# Patient Record
Sex: FEMALE | Race: WHITE | NOT HISPANIC OR LATINO | Employment: FULL TIME | URBAN - METROPOLITAN AREA
[De-identification: names, ages, dates, MRNs, and addresses within clinical notes are randomized per-mention and may not be internally consistent; named-entity substitution may affect disease eponyms.]

---

## 2017-07-27 ENCOUNTER — ALLSCRIPTS OFFICE VISIT (OUTPATIENT)
Dept: OTHER | Facility: OTHER | Age: 37
End: 2017-07-27

## 2017-11-27 ENCOUNTER — ALLSCRIPTS OFFICE VISIT (OUTPATIENT)
Dept: PERINATAL CARE | Facility: CLINIC | Age: 37
End: 2017-11-27
Payer: COMMERCIAL

## 2017-11-27 PROCEDURE — 99203 OFFICE O/P NEW LOW 30 MIN: CPT | Performed by: GENETIC COUNSELOR, MS

## 2017-11-27 PROCEDURE — G0463 HOSPITAL OUTPT CLINIC VISIT: HCPCS | Performed by: GENETIC COUNSELOR, MS

## 2018-01-13 VITALS
HEIGHT: 67 IN | SYSTOLIC BLOOD PRESSURE: 106 MMHG | DIASTOLIC BLOOD PRESSURE: 52 MMHG | BODY MASS INDEX: 25.43 KG/M2 | WEIGHT: 162 LBS

## 2018-01-16 NOTE — PROGRESS NOTES
Chief Complaint  Patient and partner, Benson Caro, seen for genetic counseling to discuss maternal age related risks for aneuploidy and concerns related to a family history of cystic fibrosis  At the time of our genetic counseling session Genesis Skinner was not pregnant  At the age of 45 at delivery there is an estimated 1/104 risk for a fetal chromosome abnormality at term  Approximately half of these abnormalities are due to Down syndrome with the remainder due to other chromosome abnormalities  This couple was advised of the available prenatal diagnostic and screening procedures for consideration in a future pregnancy  I reviewed with the patient the options regarding prenatal diagnosis for chromosome abnormalities including CVS and amniocentesis  Chorionic villus sampling(CVS) is generally performed between 10 and 12 weeks of pregnancy and amniocentesis is generally performed at 16 weeks or later  Recent literature supports that CVS and amniocentesis both have an associated procedure related risk of miscarriage of less than 1/300  Chromosome results from CVS or amniocentesis are greater than 99 9% accurate  In addition microarray testing can detect submicroscopic deletions and duplications throughout the genome  Occasionally a repeat procedure may be necessary due to cell culture failure  Approximately 1% of the time, a mosaic chromosome result from CVS will necessitate a followup amniocentesis  Measurement of AFP from amniotic fluid is able to detect approximately 95% of open neural tube defects  Maternal serum AFP is recommended for patients who elect CVS     We also reviewed the availability and limitations of sequential screening, NIPS, and level II ultrasound evaluation   Sequential screening consisting of first trimester measurement of nuchal translucency combined with first trimester biochemical analysis as well as second trimester biochemical analysis is able to detect approximately 90% of pregnancies in which the fetus has Down syndrome, 90% of pregnancies in which the fetus has trisomy 25 and 80% of pregnancies which appears has an open neural tube defect  NIPS involves assessment of fragments of fetal DNA in maternal blood  This test has varying detection rates depending on the laboratory used though the quoted detection rate for Down syndrome is generally greater than 99% and detection rate for trisomy 18 is 98% or better  NIPS has a low false positive rate however consideration of prenatal diagnostic testing is always recommended following a positive NIPS  MSAFP is recommended for patients electing NIPS  Level II ultrasound evaluation is able to detect many major physical birth defects and variations in fetal development that may be associated with chromosome abnormalities  Level II ultrasound evaluation is not able to detect all birth defects or health problems  During our counseling session histories were taken on the patient's family and her 's family  Ajit Jung reports that his older sister tested positive as a carrier for cystic fibrosis at the time of routine prenatal screening  We discussed that he would have a 1/2 or 50 percent risk to also be a carrier for cystic fibrosis  We reviewed autosomal recessive inheritance in the general population carrier frequency for CF of approximately 1/25  The overall risk for them to have an affected child is 1/200  They were advised of the availability and limitations of carrier screening for CF  I encouraged Ajit Jung to obtain copies of his sisters test results so that we can ensure or testing on him includes the mutation she has  They were provided with CPT an ICD 10 codes for checking insurance coverage  We also discussed carrier screening for SMA and Fragile X and they were provided with the CPT codes for those tests as well   They were instructed to get back to me to facilitate testing once they have confirmed which tests they would like to shayy Lennon's history was also significant for him having a complex congenital heart defect requiring surgery at approximately 10months of age for tetralogy of Fallot and a 2nd surgery at approximately 1years of age for a VSD  He denies any other medical problems are known genetic diagnosis therefore we reviewed the multifactorial inheritance of apparently isolated congenital heart defects  They were advised of the recurrence risk of approximately 3-5 percent in a future pregnancy and the availability limitations of ultrasound evaluation including nuchal translucency, level 2 and fetal echocardiography for detecting congenital heart defects prenatally  Elenita Pavon reports having a paternal aunt diagnosed with breast cancer in her 35s  This aunt passed away from complications of this diagnosis at the age of 37  She expressed interest in considering a cancer risk evaluation and was provided with the contact information for the genetic counselor in the cancer center for further discussion and facilitation of testing  Patient reports being of [place], [place], [place] and native [place] descent while her  reports being of [place], [place], [place] and [facility] descent  They both deny having any known Jew ancestry  Hemoglobinopathy screening is also recommended if not previously performed  Lastly, we discussed the fact that it is important to keep in mind that everyone in the general population regardless of age, family history, or medical background has approximately a 3% risk of having a child with some type of her defect, genetic disease or intellectual disability  Currently there are no tests available to rule out all birth defects or health problems  Active Problems    1  Encounter for preconception consultation (V26 49) (Z31 69)   2  Genetic counseling and testing (V26 33)    Surgical History    1  History of Cervical Loop Electrosurgical Excision (LEEP)   2   History of Oral Surgery Tooth Extraction   3  History of Tonsillectomy    Family History    1  Family history of arthritis (V17 7) (Z82 61)   2  Family history of diabetes mellitus (V18 0) (Z83 3)   3  Family history of thyroid disease (V18 19) (Z83 49)   4  Family history of varicose veins (V17 49) (Z82 49)    5  Family history of Blood Transfusion (___ Ml)   6  Family history of hypertension (V17 49) (Z82 49)   7  Family history of skin cancer (V16 8) (Z80 8)    8  Family history of skin cancer (V16 8) (Z80 8)    9  Family history of osteoporosis (V17 81) (Z82 62)   10  Family history of skin cancer (V16 8) (Z80 8)    11  Family history of skin cancer (V16 8) (Z80 8)    12  Family history of skin cancer (V16 8) (Z80 8)    13  Family history of malignant neoplasm of breast (V16 3) (Z80 3)    14  Family history of hypertension (V17 49) (Z82 49)    Social History    ·    · Never a smoker   · Sexually active   · Social alcohol use (Z78 9)    Current Meds   1  NuvaRing RING; Therapy: (Recorded:27Nov2017) to Recorded    Allergies    1  No Known Drug Allergies    2  Animal dander - Cats   3  Dust   4  Mold   5  Other   6  Pollen   7   Seasonal    Vitals  Signs   Recorded: [id number] 20:15PA   Systolic: 269, RUE, Sitting  Diastolic: 70, RUE, Sitting  Height: 5 ft 6 5 in  Weight: 164 lb   BMI Calculated: 26 07  BSA Calculated: 1 85  Pain Scale: 0    Signatures   Electronically signed by : Kenia Oneil, ; Nov 27 2017 12:55PM EST                       (Author)

## 2018-01-22 VITALS
DIASTOLIC BLOOD PRESSURE: 70 MMHG | SYSTOLIC BLOOD PRESSURE: 104 MMHG | WEIGHT: 164 LBS | BODY MASS INDEX: 25.74 KG/M2 | HEIGHT: 67 IN

## 2018-11-13 ENCOUNTER — TELEPHONE (OUTPATIENT)
Dept: OBGYN CLINIC | Facility: CLINIC | Age: 38
End: 2018-11-13

## 2018-11-13 DIAGNOSIS — O20.9 VAGINAL BLEEDING IN PREGNANCY, FIRST TRIMESTER: Primary | ICD-10-CM

## 2018-11-13 NOTE — TELEPHONE ENCOUNTER
Dear Dr Linda Harris:    Pt called office today c/o vaginal "spotting" in early pregnancy  Pt's LMP is 18,  CHRISTOPHE is 19 based on LMP, GA 9 wks + 0 dys  Pt is scheduled for early US with DAYSI Aguilar on 18  Pt's blood type information is not available in Pt's EHR at this time  Pt states she does not know her blood type at this time  Pt denies miscarriage and voluntary  history  Pt states when wiping after voiding she notices blood on toilet tissue  Pt further states said blood varies from pink to brownish in color  Pt denies cramping or any other adverse symptoms at this time  Pt states she has  1 Para 0 status  Pt's name is Mer River (: [birth year])  Please advise  Thank you!     Joycelyn Watson MA

## 2018-11-13 NOTE — TELEPHONE ENCOUNTER
Spoke with patient  Us scheduled for tomorrow with Dr Dennis Montana in [facility] office at [phone]  Address provided  Order for T&S placed   bleeding worsens or pain develops go to E R

## 2018-11-13 NOTE — TELEPHONE ENCOUNTER
Pt called in regards to spotting  Pt is currently about 9wks pregnant lmp 9/11 has appt for early u/s on 11/29   Pt states that today she has some light pink spotting no cramps please advise

## 2018-11-14 ENCOUNTER — OFFICE VISIT (OUTPATIENT)
Dept: OBGYN CLINIC | Facility: MEDICAL CENTER | Age: 38
End: 2018-11-14
Payer: COMMERCIAL

## 2018-11-14 ENCOUNTER — APPOINTMENT (OUTPATIENT)
Dept: LAB | Facility: MEDICAL CENTER | Age: 38
End: 2018-11-14
Payer: COMMERCIAL

## 2018-11-14 VITALS
RESPIRATION RATE: 14 BRPM | HEIGHT: 66 IN | BODY MASS INDEX: 26.52 KG/M2 | WEIGHT: 165 LBS | SYSTOLIC BLOOD PRESSURE: 120 MMHG | DIASTOLIC BLOOD PRESSURE: 72 MMHG

## 2018-11-14 DIAGNOSIS — O26.851 SPOTTING AFFECTING PREGNANCY IN FIRST TRIMESTER: Primary | ICD-10-CM

## 2018-11-14 DIAGNOSIS — O20.9 VAGINAL BLEEDING IN PREGNANCY, FIRST TRIMESTER: ICD-10-CM

## 2018-11-14 LAB
ABO GROUP BLD: NORMAL
BLD GP AB SCN SERPL QL: NEGATIVE
RH BLD: POSITIVE
SPECIMEN EXPIRATION DATE: NORMAL

## 2018-11-14 PROCEDURE — 86901 BLOOD TYPING SEROLOGIC RH(D): CPT

## 2018-11-14 PROCEDURE — 86900 BLOOD TYPING SEROLOGIC ABO: CPT

## 2018-11-14 PROCEDURE — 36415 COLL VENOUS BLD VENIPUNCTURE: CPT

## 2018-11-14 PROCEDURE — 76817 TRANSVAGINAL US OBSTETRIC: CPT | Performed by: OBSTETRICS & GYNECOLOGY

## 2018-11-14 PROCEDURE — 86850 RBC ANTIBODY SCREEN: CPT

## 2018-11-14 PROCEDURE — 99213 OFFICE O/P EST LOW 20 MIN: CPT | Performed by: OBSTETRICS & GYNECOLOGY

## 2018-11-14 NOTE — PROGRESS NOTES
Assessment/Plan:      Diagnoses and all orders for this visit:    Dionicio Person affecting pregnancy in first trimester        Suspected missed AB at 6 weeks  Return in 1 week for ultrasound  Subjective:     Patient ID: Peyton Kenyon is a 45 y o  female  Patient is a 27-year-old female para 0 at 9 weeks and 1 day by LMP (normal) here with a complaint of vaginal spotting and mild pelvic cramps  Patient is unaware for Rh status  Patient denies a history of ectopic pregnancy  This is a wanted pregnancy  Past medical history is negative  Past surgical history significant for VAISHALI JULIANA  Current medications none  No known drug allergies  Father of the baby was born with a cardiac defect,  tetralogy of Fallot  Patient denies a history of genital herpes  Patient having reports chickenpox as a child  Review of Systems   Gastrointestinal: Negative for abdominal pain  Genitourinary: Positive for vaginal bleeding  Negative for pelvic pain  Objective:     Physical Exam   Constitutional: She is oriented to person, place, and time  She appears well-developed  Pulmonary/Chest: Effort normal    Neurological: She is oriented to person, place, and time  Psychiatric: She has a normal mood and affect   Her behavior is normal

## 2018-11-14 NOTE — PROGRESS NOTES
Procedures  Early OB Ultrasound Procedure Note    Referring Physician: No ref  provider found  Technician: Study performed by the interpreting physician    Indications:  Early gestation, spotting    Procedure Details   The entire study was done at a setting of 6 5 MHz  The cul-de-sac is free and contains no fluid  Measurements accompanying this report are noted  A gestational sac is seen containing a yolk sac and a ramires embryo  No subchorionic lucency is noted  The embryonic crown-rump length calculates to an estimated gestational age of 7 weeks, 1 days  Embryonic cardiac activity is not seen  Placental location:  Description of fetal anatomy:  Description of ovaries:Normal  Description of uterus:retroverted    Findings: ramires intrauterine pregnancy at 6 weeks, 1 days, with no heart beat  Repeat sonogram in 1 week

## 2018-11-27 ENCOUNTER — OFFICE VISIT (OUTPATIENT)
Dept: OBGYN CLINIC | Facility: MEDICAL CENTER | Age: 38
End: 2018-11-27
Payer: COMMERCIAL

## 2018-11-27 VITALS
WEIGHT: 165.4 LBS | DIASTOLIC BLOOD PRESSURE: 60 MMHG | SYSTOLIC BLOOD PRESSURE: 114 MMHG | BODY MASS INDEX: 26.58 KG/M2 | HEIGHT: 66 IN | RESPIRATION RATE: 14 BRPM

## 2018-11-27 DIAGNOSIS — O03.9 SPONTANEOUS ABORTION: Primary | ICD-10-CM

## 2018-11-27 PROCEDURE — 76817 TRANSVAGINAL US OBSTETRIC: CPT | Performed by: PHYSICIAN ASSISTANT

## 2018-11-27 NOTE — PROGRESS NOTES
Assessment/Plan:    Spontaneous   US findings c/w complete SAb  No evidence of gestational sac or POC  Will check beta HCG, trend down if necessary  Rh pos  Pt appropriately upset, all questions answered    rev'd w/ pt that ~20% of all pregnancies end in miscarriage and miscarriage may occur for many reasons, not all of which can be identified  Advised to wait 2-3 cycles before TTC as she and partner hope to start TTC in near future         Diagnoses and all orders for this visit:    Spontaneous   -     hCG, quantitative; Future        Subjective:      Patient ID: Angela Sesay is a 45 y o  female  Pt presents for f/u dating [address] [place] : The embryonic crown-rump length calculates to an estimated gestational age of 7 weeks, 1 days  Embryonic cardiac activity is not seen  Spotting noted prior to last US  Rh pos    Pt states she experienced heavy bleeding after last US, noted clotting/tissue w/ bleeding   Lasted approx 1 wk, has since stopped  No pelvic pain, cramping, persistent spotting or bleeding since that time          The following portions of the patient's history were reviewed and updated as appropriate: allergies, current medications, past family history, past medical history, past social history, past surgical history and problem list     Review of Systems   Constitutional: Negative for appetite change, fatigue and unexpected weight change  Respiratory: Negative for chest tightness and shortness of breath  Cardiovascular: Negative for chest pain, palpitations and leg swelling  Gastrointestinal: Negative for abdominal pain, constipation, diarrhea, nausea and vomiting  Genitourinary: Positive for vaginal bleeding  Negative for difficulty urinating, dyspareunia, menstrual problem, pelvic pain and vaginal discharge  Musculoskeletal: Negative for arthralgias and myalgias  Neurological: Negative for dizziness, light-headedness and headaches     Psychiatric/Behavioral: Negative for dysphoric mood  The patient is not nervous/anxious  All other systems reviewed and are negative  Objective:      /60 (BP Location: Left arm, Patient Position: Sitting, Cuff Size: Standard)   Resp 14   Ht 5' 6" (1 676 m)   Wt 75 kg (165 lb 6 4 oz)   LMP 09/11/2018 (Exact Date)   BMI 26 70 kg/m²          Physical Exam   Constitutional: She is oriented to person, place, and time  She appears well-developed and well-nourished  HENT:   Head: Normocephalic and atraumatic  Neurological: She is alert and oriented to person, place, and time  Skin: Skin is warm and dry  Psychiatric: She has a normal mood and affect  Nursing note and vitals reviewed

## 2018-11-27 NOTE — ASSESSMENT & PLAN NOTE
US findings c/w complete SAb  No evidence of gestational sac or POC  Will check beta HCG, trend down if necessary  Rh pos  Pt appropriately upset, all questions answered    rev'd w/ pt that ~20% of all pregnancies end in miscarriage and miscarriage may occur for many reasons, not all of which can be identified     Advised to wait 2-3 cycles before TTC as she and partner hope to start TTC in near future

## 2019-03-20 ENCOUNTER — OFFICE VISIT (OUTPATIENT)
Dept: URGENT CARE | Facility: MEDICAL CENTER | Age: 39
End: 2019-03-20
Payer: COMMERCIAL

## 2019-03-20 VITALS
WEIGHT: 160 LBS | SYSTOLIC BLOOD PRESSURE: 120 MMHG | OXYGEN SATURATION: 100 % | DIASTOLIC BLOOD PRESSURE: 76 MMHG | RESPIRATION RATE: 20 BRPM | BODY MASS INDEX: 25.71 KG/M2 | HEART RATE: 74 BPM | HEIGHT: 66 IN | TEMPERATURE: 98 F

## 2019-03-20 DIAGNOSIS — R30.0 DYSURIA: Primary | ICD-10-CM

## 2019-03-20 LAB
SL AMB  POCT GLUCOSE, UA: ABNORMAL
SL AMB LEUKOCYTE ESTERASE,UA: ABNORMAL
SL AMB POCT BILIRUBIN,UA: ABNORMAL
SL AMB POCT BLOOD,UA: ABNORMAL
SL AMB POCT CLARITY,UA: ABNORMAL
SL AMB POCT COLOR,UA: ABNORMAL
SL AMB POCT KETONES,UA: ABNORMAL
SL AMB POCT NITRITE,UA: ABNORMAL
SL AMB POCT PH,UA: 6
SL AMB POCT SPECIFIC GRAVITY,UA: 1
SL AMB POCT URINE HCG: NEGATIVE
SL AMB POCT URINE PROTEIN: ABNORMAL
SL AMB POCT UROBILINOGEN: 0.2

## 2019-03-20 PROCEDURE — 99213 OFFICE O/P EST LOW 20 MIN: CPT | Performed by: PHYSICIAN ASSISTANT

## 2019-03-20 PROCEDURE — 81002 URINALYSIS NONAUTO W/O SCOPE: CPT | Performed by: PHYSICIAN ASSISTANT

## 2019-03-20 RX ORDER — NITROFURANTOIN 25; 75 MG/1; MG/1
100 CAPSULE ORAL 2 TIMES DAILY
Qty: 10 CAPSULE | Refills: 0 | Status: SHIPPED | OUTPATIENT
Start: 2019-03-20 | End: 2019-03-25

## 2019-03-20 NOTE — PROGRESS NOTES
[facility]      NAME: Claudean Knack is a 45 y o  female  : 1980    MRN: [id number]  DATE: 2019  TIME: 7:10 PM    Assessment and Plan   Dysuria [R30 0]  1  Dysuria  POCT urine HCG    POCT urine dip    nitrofurantoin (MACROBID) 100 mg capsule    Urine culture       Patient Instructions     Urine dip preg negative    Patient's symptoms are likely consistent with UTI    - An antibiotic have been prescribed, please take as directed  - Plenty of fluids advised  - Recommend daily probiotic or eating yogurt with live cultures while on antibiotic   - Urine culture will be sent for definitive evaluation  Results take approx 72 hours to return  - May try over-the-counter Pyridium for next 24-48 hours for any excessive burning, pain, or pressure in the bladder area  Be advised, it may turn urine an orange color   - Watch for any worsening of the symptoms or any fever or chills, flank pain, nausea or worsening abdominal pain and if any, advised to seek immediate medical attention    - If symptoms persist, follow-up with PCP for re-evaluation in 5-7 days      Chief Complaint     Chief Complaint   Patient presents with    Possible UTI     Blood in urine, urinating frequently         History of Present Illness   Claudean Knack presents to the clinic c/o      80-year-old female, presents for evaluation of burning with urination, increased urinary frequency /urgency as well as visible blood in the urine, that started today  Patient denies any history of kidney stone, new onset low back pain  She denies any abdominal pain, diaphoretic episodes, nausea vomiting diarrhea  Review of Systems   Review of Systems   Constitutional: Negative for fever  Gastrointestinal: Negative for abdominal pain and nausea  Genitourinary: Positive for dysuria, frequency, hematuria and urgency  Current Medications     No long-term medications on file         Current Allergies     Allergies as of 2019 - Reviewed 03/20/2019   Allergen Reaction Noted    Mold extract  [trichophyton]  11/27/2017    Other  11/27/2017    Pollen extract  11/27/2017            The following portions of the patient's history were reviewed and updated as appropriate: allergies, current medications, past family history, past medical history, past social history, past surgical history and problem list     HISTORICAL INFO:  History reviewed  No pertinent past medical history  Past Surgical History:   Procedure Laterality Date    WISDOM TOOTH EXTRACTION         Objective   /76   Pulse 74   Temp 98 °F (36 7 °C) (Temporal)   Resp 20   Ht 5' 6" (1 676 m)   Wt 72 6 kg (160 lb)   LMP 09/11/2018 (Exact Date)   SpO2 100%   BMI 25 82 kg/m²        Physical Exam     Physical Exam   Constitutional: She appears well-developed and well-nourished  No distress  HENT:   Head: Normocephalic and atraumatic  Cardiovascular: Normal rate, regular rhythm and normal heart sounds  Pulmonary/Chest: Effort normal and breath sounds normal  No respiratory distress  She has no wheezes  She has no rales  Abdominal: Normal appearance  There is no tenderness  There is no rigidity, no rebound, no guarding, no CVA tenderness and negative Tafoya's sign  Skin: Skin is warm and dry  She is not diaphoretic  Nursing note and vitals reviewed  M*Modal software was used to dictate this note  It may contain errors with dictating incorrect words/spelling  Please contact provider directly for any questions       Consuelo Bailey PA-C

## 2019-03-29 ENCOUNTER — OFFICE VISIT (OUTPATIENT)
Dept: URGENT CARE | Facility: MEDICAL CENTER | Age: 39
End: 2019-03-29
Payer: COMMERCIAL

## 2019-03-29 VITALS
HEART RATE: 75 BPM | HEIGHT: 66 IN | RESPIRATION RATE: 20 BRPM | DIASTOLIC BLOOD PRESSURE: 70 MMHG | OXYGEN SATURATION: 100 % | WEIGHT: 162.6 LBS | BODY MASS INDEX: 26.13 KG/M2 | TEMPERATURE: 99.7 F | SYSTOLIC BLOOD PRESSURE: 96 MMHG

## 2019-03-29 DIAGNOSIS — R30.0 DYSURIA: Primary | ICD-10-CM

## 2019-03-29 LAB
SL AMB  POCT GLUCOSE, UA: ABNORMAL
SL AMB LEUKOCYTE ESTERASE,UA: ABNORMAL
SL AMB POCT BILIRUBIN,UA: ABNORMAL
SL AMB POCT BLOOD,UA: ABNORMAL
SL AMB POCT CLARITY,UA: ABNORMAL
SL AMB POCT COLOR,UA: YELLOW
SL AMB POCT KETONES,UA: ABNORMAL
SL AMB POCT NITRITE,UA: ABNORMAL
SL AMB POCT PH,UA: 6
SL AMB POCT SPECIFIC GRAVITY,UA: 1.01
SL AMB POCT URINE PROTEIN: ABNORMAL
SL AMB POCT UROBILINOGEN: 0.2

## 2019-03-29 PROCEDURE — 87077 CULTURE AEROBIC IDENTIFY: CPT

## 2019-03-29 PROCEDURE — 87086 URINE CULTURE/COLONY COUNT: CPT

## 2019-03-29 PROCEDURE — 81002 URINALYSIS NONAUTO W/O SCOPE: CPT

## 2019-03-29 PROCEDURE — 99213 OFFICE O/P EST LOW 20 MIN: CPT

## 2019-03-29 PROCEDURE — 87186 SC STD MICRODIL/AGAR DIL: CPT

## 2019-03-29 RX ORDER — CIPROFLOXACIN 500 MG/1
500 TABLET, FILM COATED ORAL EVERY 12 HOURS SCHEDULED
Qty: 14 TABLET | Refills: 0 | Status: SHIPPED | OUTPATIENT
Start: 2019-03-29 | End: 2019-04-05

## 2019-03-31 LAB — BACTERIA UR CULT: ABNORMAL

## 2019-05-23 ENCOUNTER — INITIAL PRENATAL (OUTPATIENT)
Dept: OBGYN CLINIC | Facility: CLINIC | Age: 39
End: 2019-05-23

## 2019-05-23 VITALS
SYSTOLIC BLOOD PRESSURE: 116 MMHG | RESPIRATION RATE: 16 BRPM | DIASTOLIC BLOOD PRESSURE: 68 MMHG | HEART RATE: 88 BPM | BODY MASS INDEX: 24.23 KG/M2 | WEIGHT: 150.8 LBS | HEIGHT: 66 IN

## 2019-05-23 DIAGNOSIS — Z98.890 HISTORY OF LOOP ELECTRICAL EXCISION PROCEDURE (LEEP): ICD-10-CM

## 2019-05-23 DIAGNOSIS — Z3A.11 11 WEEKS GESTATION OF PREGNANCY: ICD-10-CM

## 2019-05-23 DIAGNOSIS — O09.511 AMA (ADVANCED MATERNAL AGE) PRIMIGRAVIDA 35+, FIRST TRIMESTER: ICD-10-CM

## 2019-05-23 DIAGNOSIS — Z34.01 ENCOUNTER FOR SUPERVISION OF NORMAL FIRST PREGNANCY IN FIRST TRIMESTER: Primary | ICD-10-CM

## 2019-05-23 PROCEDURE — OBC: Performed by: OBSTETRICS & GYNECOLOGY

## 2019-05-24 LAB
ABO GROUP BLD: ABNORMAL
BASOPHILS # BLD AUTO: 0 X10E3/UL (ref 0–0.2)
BASOPHILS NFR BLD AUTO: 0 %
BLD GP AB SCN SERPL QL: NEGATIVE
EOSINOPHIL # BLD AUTO: 0.1 X10E3/UL (ref 0–0.4)
EOSINOPHIL NFR BLD AUTO: 1 %
ERYTHROCYTE [DISTWIDTH] IN BLOOD BY AUTOMATED COUNT: 13.5 % (ref 12.3–15.4)
HBV SURFACE AG SERPL QL IA: NEGATIVE
HCT VFR BLD AUTO: 36.1 % (ref 34–46.6)
HGB BLD-MCNC: 12.2 G/DL (ref 11.1–15.9)
HIV 1+2 AB+HIV1 P24 AG SERPL QL IA: NON REACTIVE
IMM GRANULOCYTES # BLD: 0 X10E3/UL (ref 0–0.1)
IMM GRANULOCYTES NFR BLD: 0 %
LYMPHOCYTES # BLD AUTO: 2 X10E3/UL (ref 0.7–3.1)
LYMPHOCYTES NFR BLD AUTO: 17 %
MCH RBC QN AUTO: 28.8 PG (ref 26.6–33)
MCHC RBC AUTO-ENTMCNC: 33.8 G/DL (ref 31.5–35.7)
MCV RBC AUTO: 85 FL (ref 79–97)
MONOCYTES # BLD AUTO: 0.5 X10E3/UL (ref 0.1–0.9)
MONOCYTES NFR BLD AUTO: 4 %
NEUTROPHILS # BLD AUTO: 9.4 X10E3/UL (ref 1.4–7)
NEUTROPHILS NFR BLD AUTO: 78 %
PLATELET # BLD AUTO: 330 X10E3/UL (ref 150–450)
RBC # BLD AUTO: 4.23 X10E6/UL (ref 3.77–5.28)
RH BLD: POSITIVE
RPR SER QL: NON REACTIVE
RUBV IGG SERPL IA-ACNC: 1.56 INDEX
WBC # BLD AUTO: 12.1 X10E3/UL (ref 3.4–10.8)

## 2019-06-01 ENCOUNTER — ROUTINE PRENATAL (OUTPATIENT)
Dept: PERINATAL CARE | Facility: CLINIC | Age: 39
End: 2019-06-01
Payer: COMMERCIAL

## 2019-06-01 VITALS
HEART RATE: 83 BPM | HEIGHT: 66 IN | BODY MASS INDEX: 24.49 KG/M2 | WEIGHT: 152.4 LBS | DIASTOLIC BLOOD PRESSURE: 68 MMHG | SYSTOLIC BLOOD PRESSURE: 106 MMHG

## 2019-06-01 DIAGNOSIS — Z3A.12 12 WEEKS GESTATION OF PREGNANCY: ICD-10-CM

## 2019-06-01 DIAGNOSIS — O35.2XX0 HEREDITARY DISEASE IN FAMILY POSSIBLY AFFECTING FETUS, AFFECTING MANAGEMENT OF MOTHER IN PREGNANCY, SINGLE OR UNSPECIFIED FETUS: ICD-10-CM

## 2019-06-01 DIAGNOSIS — Z98.890 HISTORY OF LOOP ELECTRICAL EXCISION PROCEDURE (LEEP): ICD-10-CM

## 2019-06-01 DIAGNOSIS — O09.521 ELDERLY MULTIGRAVIDA, FIRST TRIMESTER: Primary | ICD-10-CM

## 2019-06-01 DIAGNOSIS — Z34.01 ENCOUNTER FOR SUPERVISION OF NORMAL FIRST PREGNANCY IN FIRST TRIMESTER: ICD-10-CM

## 2019-06-01 PROCEDURE — 76801 OB US < 14 WKS SINGLE FETUS: CPT | Performed by: OBSTETRICS & GYNECOLOGY

## 2019-06-01 PROCEDURE — 76813 OB US NUCHAL MEAS 1 GEST: CPT | Performed by: OBSTETRICS & GYNECOLOGY

## 2019-06-01 PROCEDURE — 99241 PR OFFICE CONSULTATION NEW/ESTAB PATIENT 15 MIN: CPT | Performed by: OBSTETRICS & GYNECOLOGY

## 2019-06-03 ENCOUNTER — OFFICE VISIT (OUTPATIENT)
Dept: PERINATAL CARE | Facility: CLINIC | Age: 39
End: 2019-06-03

## 2019-06-03 VITALS
BODY MASS INDEX: 24.43 KG/M2 | WEIGHT: 152 LBS | HEART RATE: 82 BPM | DIASTOLIC BLOOD PRESSURE: 66 MMHG | SYSTOLIC BLOOD PRESSURE: 101 MMHG | HEIGHT: 66 IN

## 2019-06-03 DIAGNOSIS — Z3A.13 13 WEEKS GESTATION OF PREGNANCY: ICD-10-CM

## 2019-06-03 DIAGNOSIS — O09.521 ELDERLY MULTIGRAVIDA, FIRST TRIMESTER: Primary | ICD-10-CM

## 2019-06-04 ENCOUNTER — OFFICE VISIT (OUTPATIENT)
Dept: URGENT CARE | Facility: MEDICAL CENTER | Age: 39
End: 2019-06-04
Payer: COMMERCIAL

## 2019-06-04 VITALS
RESPIRATION RATE: 18 BRPM | TEMPERATURE: 98.1 F | HEIGHT: 66 IN | HEART RATE: 76 BPM | SYSTOLIC BLOOD PRESSURE: 96 MMHG | DIASTOLIC BLOOD PRESSURE: 62 MMHG | OXYGEN SATURATION: 100 % | WEIGHT: 155.4 LBS | BODY MASS INDEX: 24.98 KG/M2

## 2019-06-04 DIAGNOSIS — R10.9 FLANK PAIN: Primary | ICD-10-CM

## 2019-06-04 PROCEDURE — 99213 OFFICE O/P EST LOW 20 MIN: CPT | Performed by: PHYSICIAN ASSISTANT

## 2019-06-04 PROCEDURE — 81002 URINALYSIS NONAUTO W/O SCOPE: CPT | Performed by: PHYSICIAN ASSISTANT

## 2019-06-06 ENCOUNTER — ROUTINE PRENATAL (OUTPATIENT)
Dept: OBGYN CLINIC | Facility: CLINIC | Age: 39
End: 2019-06-06

## 2019-06-06 VITALS — WEIGHT: 150 LBS | SYSTOLIC BLOOD PRESSURE: 108 MMHG | DIASTOLIC BLOOD PRESSURE: 64 MMHG | BODY MASS INDEX: 24.21 KG/M2

## 2019-06-06 DIAGNOSIS — O09.521 ELDERLY MULTIGRAVIDA, FIRST TRIMESTER: ICD-10-CM

## 2019-06-06 DIAGNOSIS — Z3A.13 13 WEEKS GESTATION OF PREGNANCY: ICD-10-CM

## 2019-06-06 DIAGNOSIS — Z34.81 ENCOUNTER FOR SUPERVISION OF OTHER NORMAL PREGNANCY IN FIRST TRIMESTER: Primary | ICD-10-CM

## 2019-06-06 DIAGNOSIS — Z12.4 SCREENING FOR MALIGNANT NEOPLASM OF THE CERVIX: ICD-10-CM

## 2019-06-06 DIAGNOSIS — Z11.3 SCREENING FOR STD (SEXUALLY TRANSMITTED DISEASE): ICD-10-CM

## 2019-06-06 DIAGNOSIS — Z98.890 HISTORY OF LOOP ELECTRICAL EXCISION PROCEDURE (LEEP): ICD-10-CM

## 2019-06-06 PROCEDURE — NOBC: Performed by: OBSTETRICS & GYNECOLOGY

## 2019-06-07 LAB
BACTERIA UR CULT: NORMAL
Lab: NO GROWTH
SL AMB  POCT GLUCOSE, UA: ABNORMAL
SL AMB LEUKOCYTE ESTERASE,UA: ABNORMAL
SL AMB POCT BILIRUBIN,UA: ABNORMAL
SL AMB POCT BLOOD,UA: ABNORMAL
SL AMB POCT CLARITY,UA: CLEAR
SL AMB POCT COLOR,UA: ABNORMAL
SL AMB POCT KETONES,UA: ABNORMAL
SL AMB POCT NITRITE,UA: ABNORMAL
SL AMB POCT PH,UA: 7
SL AMB POCT SPECIFIC GRAVITY,UA: 1.02
SL AMB POCT URINE PROTEIN: ABNORMAL
SL AMB POCT UROBILINOGEN: 0.2

## 2019-06-10 LAB
C TRACH RRNA CVX QL NAA+PROBE: NEGATIVE
CYTOLOGIST CVX/VAG CYTO: NORMAL
DX ICD CODE: NORMAL
HPV I/H RISK 1 DNA CVX QL PROBE+SIG AMP: NEGATIVE
N GONORRHOEA RRNA CVX QL NAA+PROBE: NEGATIVE
OTHER STN SPEC: NORMAL
PATH REPORT.FINAL DX SPEC: NORMAL
SL AMB NOTE:: NORMAL
SL AMB SPECIMEN ADEQUACY: NORMAL
SL AMB TEST METHODOLOGY: NORMAL

## 2019-06-13 ENCOUNTER — TELEPHONE (OUTPATIENT)
Dept: PERINATAL CARE | Facility: CLINIC | Age: 39
End: 2019-06-13

## 2019-06-26 ENCOUNTER — TRANSCRIBE ORDERS (OUTPATIENT)
Dept: LAB | Facility: CLINIC | Age: 39
End: 2019-06-26

## 2019-06-26 ENCOUNTER — LAB (OUTPATIENT)
Dept: LAB | Facility: CLINIC | Age: 39
End: 2019-06-26
Payer: COMMERCIAL

## 2019-06-26 DIAGNOSIS — Z36.9 UNSPECIFIED ANTENATAL SCREENING: ICD-10-CM

## 2019-06-26 DIAGNOSIS — Z33.1 PREGNANT STATE, INCIDENTAL: Primary | ICD-10-CM

## 2019-06-26 DIAGNOSIS — Z33.1 PREGNANT STATE, INCIDENTAL: ICD-10-CM

## 2019-06-26 PROCEDURE — 36415 COLL VENOUS BLD VENIPUNCTURE: CPT

## 2019-06-26 PROCEDURE — 82105 ALPHA-FETOPROTEIN SERUM: CPT

## 2019-07-01 ENCOUNTER — ROUTINE PRENATAL (OUTPATIENT)
Dept: OBGYN CLINIC | Facility: CLINIC | Age: 39
End: 2019-07-01

## 2019-07-01 VITALS — WEIGHT: 153 LBS | SYSTOLIC BLOOD PRESSURE: 102 MMHG | BODY MASS INDEX: 24.69 KG/M2 | DIASTOLIC BLOOD PRESSURE: 60 MMHG

## 2019-07-01 DIAGNOSIS — Z3A.17 17 WEEKS GESTATION OF PREGNANCY: ICD-10-CM

## 2019-07-01 DIAGNOSIS — O09.522 MULTIGRAVIDA OF ADVANCED MATERNAL AGE IN SECOND TRIMESTER: ICD-10-CM

## 2019-07-01 DIAGNOSIS — Z34.82 PRENATAL CARE, SUBSEQUENT PREGNANCY IN SECOND TRIMESTER: Primary | ICD-10-CM

## 2019-07-01 PROCEDURE — PNV: Performed by: OBSTETRICS & GYNECOLOGY

## 2019-07-01 NOTE — PROGRESS NOTES
45 y o    female at 17w1d EGA for PNV  BP : 102/60  TWG: 3llbs  Discussed constipation - prune juice/apple juice, colace, hydration  No contractions or bleeding  Good FM  Not finding out sex of baby, has level II scheduled  Traveling to [facility] soon  Discussed walking during flight, staying hydrated  Plans on breast feeding, discussed baby&me center, breastfeeding class

## 2019-07-02 ENCOUNTER — TELEPHONE (OUTPATIENT)
Dept: OBGYN CLINIC | Facility: CLINIC | Age: 39
End: 2019-07-02

## 2019-07-02 NOTE — TELEPHONE ENCOUNTER
Pt Called, is going on cruise to [place] 7/20-7/27  Needs a note with due date and approval to go   She is 17 weeks

## 2019-07-03 ENCOUNTER — TELEPHONE (OUTPATIENT)
Dept: PERINATAL CARE | Facility: CLINIC | Age: 39
End: 2019-07-03

## 2019-07-03 LAB
2ND TRIMESTER 4 SCREEN SERPL-IMP: NORMAL
AFP ADJ MOM SERPL: 1.24
AFP INTERP AMN-IMP: NORMAL
AFP INTERP SERPL-IMP: NORMAL
AFP INTERP SERPL-IMP: NORMAL
AFP SERPL-MCNC: 41.8 NG/ML
AGE AT DELIVERY: 39.2 YR
GA METHOD: NORMAL
GA: 16.4 WEEKS
IDDM PATIENT QL: NO
MULTIPLE PREGNANCY: NO
NEURAL TUBE DEFECT RISK FETUS: 5748 %

## 2019-07-03 NOTE — TELEPHONE ENCOUNTER
----- Message from Yamileth Garces MD sent at 7/3/2019  9:32 AM EDT -----  I reviewed the lab study today and the results are normal

## 2019-07-03 NOTE — TELEPHONE ENCOUNTER
Spoke to pt and provided her with the results of the MSAFP  Pt receptive and declines questions at this time

## 2019-07-29 ENCOUNTER — ROUTINE PRENATAL (OUTPATIENT)
Dept: OBGYN CLINIC | Facility: CLINIC | Age: 39
End: 2019-07-29

## 2019-07-29 ENCOUNTER — ROUTINE PRENATAL (OUTPATIENT)
Dept: PERINATAL CARE | Facility: CLINIC | Age: 39
End: 2019-07-29
Payer: COMMERCIAL

## 2019-07-29 VITALS
BODY MASS INDEX: 25.91 KG/M2 | WEIGHT: 161.2 LBS | SYSTOLIC BLOOD PRESSURE: 101 MMHG | HEIGHT: 66 IN | HEART RATE: 73 BPM | DIASTOLIC BLOOD PRESSURE: 68 MMHG

## 2019-07-29 VITALS — DIASTOLIC BLOOD PRESSURE: 66 MMHG | SYSTOLIC BLOOD PRESSURE: 104 MMHG | WEIGHT: 163 LBS | BODY MASS INDEX: 26.31 KG/M2

## 2019-07-29 DIAGNOSIS — Z36.86 ENCOUNTER FOR ANTENATAL SCREENING FOR CERVICAL LENGTH: ICD-10-CM

## 2019-07-29 DIAGNOSIS — O09.522 ELDERLY MULTIGRAVIDA, SECOND TRIMESTER: Primary | ICD-10-CM

## 2019-07-29 DIAGNOSIS — Z3A.21 21 WEEKS GESTATION OF PREGNANCY: ICD-10-CM

## 2019-07-29 PROBLEM — O03.9 SPONTANEOUS ABORTION: Status: RESOLVED | Noted: 2018-11-27 | Resolved: 2019-07-29

## 2019-07-29 PROCEDURE — PNV: Performed by: OBSTETRICS & GYNECOLOGY

## 2019-07-29 PROCEDURE — 99212 OFFICE O/P EST SF 10 MIN: CPT | Performed by: OBSTETRICS & GYNECOLOGY

## 2019-07-29 PROCEDURE — 76817 TRANSVAGINAL US OBSTETRIC: CPT | Performed by: OBSTETRICS & GYNECOLOGY

## 2019-07-29 PROCEDURE — 76811 OB US DETAILED SNGL FETUS: CPT | Performed by: OBSTETRICS & GYNECOLOGY

## 2019-07-29 NOTE — PROGRESS NOTES
45 y o    female at 21 1 wga EGA for PNV  BP : 104/66  TW  Feeling well  No complaints  Level 2 went well today    Echo at 24-20 - father had terology  Reviewed M21 and AFP results  Discussed peds in the area  Reviewed PTL precautions   F/u 4 weeks

## 2019-07-29 NOTE — PROGRESS NOTES
The patient was seen today for an ultrasound  Please see ultrasound report (located under Ob Procedures) for additional details  Thank you very much for allowing us to participate in the care of this very nice patient  Should you have any questions, please do not hesitate to contact me  Johan Villatoro MD [id number]  Attending Physician, [place]

## 2019-07-29 NOTE — PROGRESS NOTES
Wants to discuss pediatricians   Wants to review genetic testing (DOES NOT WANT TO KNOW SEX OF BABY)

## 2019-08-22 ENCOUNTER — ROUTINE PRENATAL (OUTPATIENT)
Dept: PERINATAL CARE | Facility: CLINIC | Age: 39
End: 2019-08-22
Payer: COMMERCIAL

## 2019-08-22 VITALS
WEIGHT: 168.6 LBS | DIASTOLIC BLOOD PRESSURE: 71 MMHG | SYSTOLIC BLOOD PRESSURE: 109 MMHG | HEIGHT: 66 IN | BODY MASS INDEX: 27.1 KG/M2 | HEART RATE: 80 BPM

## 2019-08-22 DIAGNOSIS — O09.522 ELDERLY MULTIGRAVIDA, SECOND TRIMESTER: ICD-10-CM

## 2019-08-22 DIAGNOSIS — Z3A.24 24 WEEKS GESTATION OF PREGNANCY: ICD-10-CM

## 2019-08-22 DIAGNOSIS — O35.2XX0 HEREDITARY DISEASE IN FAMILY POSSIBLY AFFECTING FETUS, AFFECTING MANAGEMENT OF MOTHER IN PREGNANCY, SINGLE OR UNSPECIFIED FETUS: Primary | ICD-10-CM

## 2019-08-22 PROCEDURE — 99212 OFFICE O/P EST SF 10 MIN: CPT | Performed by: OBSTETRICS & GYNECOLOGY

## 2019-08-22 PROCEDURE — 76825 ECHO EXAM OF FETAL HEART: CPT | Performed by: OBSTETRICS & GYNECOLOGY

## 2019-08-22 PROCEDURE — 76827 ECHO EXAM OF FETAL HEART: CPT | Performed by: OBSTETRICS & GYNECOLOGY

## 2019-08-22 NOTE — PROGRESS NOTES
The patient was seen today for an ultrasound  Please see ultrasound report (located under Ob Procedures) for additional details  Thank you very much for allowing us to participate in the care of this very nice patient  Should you have any questions, please do not hesitate to contact me  Johan Chavez MD [id number] [facility]  Attending Physician, [place]

## 2019-08-26 ENCOUNTER — ROUTINE PRENATAL (OUTPATIENT)
Dept: OBGYN CLINIC | Facility: CLINIC | Age: 39
End: 2019-08-26

## 2019-08-26 VITALS — DIASTOLIC BLOOD PRESSURE: 54 MMHG | WEIGHT: 167 LBS | BODY MASS INDEX: 26.95 KG/M2 | SYSTOLIC BLOOD PRESSURE: 98 MMHG

## 2019-08-26 DIAGNOSIS — Z3A.24 24 WEEKS GESTATION OF PREGNANCY: ICD-10-CM

## 2019-08-26 DIAGNOSIS — Z34.02 ENCOUNTER FOR SUPERVISION OF NORMAL FIRST PREGNANCY IN SECOND TRIMESTER: Primary | ICD-10-CM

## 2019-08-26 DIAGNOSIS — K21.9 GASTROESOPHAGEAL REFLUX DISEASE WITHOUT ESOPHAGITIS: ICD-10-CM

## 2019-08-26 DIAGNOSIS — Z3A.25 25 WEEKS GESTATION OF PREGNANCY: ICD-10-CM

## 2019-08-26 DIAGNOSIS — O09.522 ELDERLY MULTIGRAVIDA, SECOND TRIMESTER: ICD-10-CM

## 2019-08-26 PROCEDURE — PNV: Performed by: OBSTETRICS & GYNECOLOGY

## 2019-08-26 RX ORDER — RANITIDINE HCL 75 MG
75 TABLET ORAL 2 TIMES DAILY
Qty: 60 TABLET | Refills: 2 | Status: SHIPPED | OUTPATIENT
Start: 2019-08-26 | End: 2019-10-24 | Stop reason: ALTCHOICE

## 2019-08-26 NOTE — PROGRESS NOTES
Patient is a 43-year-old  010 at 25 1 weeks gestation here for routine prenatal visit  BP: 98/54  TWlb    Patient is doing well today has minimal complaints  She denies any contractions, vaginal bleeding, loss of fluid  She is feeling good daily FM  Having more heartburn  Has been taking TUMs prn     - Rx provided for ranitidine 75mg bid  Encouraged to take consistently for better efficacy  FOB with hx of tetrology  Fetal ECHO performed 19 -- normal   28 week labs provided to patient  Patient to RTO in 4 weeks

## 2019-08-26 NOTE — PROGRESS NOTES
Pt is complaining of heartburn and acid reflux, she would like to know what she can take  Otherwise, feels good

## 2019-09-13 ENCOUNTER — TRANSCRIBE ORDERS (OUTPATIENT)
Dept: LAB | Facility: CLINIC | Age: 39
End: 2019-09-13

## 2019-09-13 ENCOUNTER — LAB (OUTPATIENT)
Dept: LAB | Facility: CLINIC | Age: 39
End: 2019-09-13
Payer: COMMERCIAL

## 2019-09-13 DIAGNOSIS — Z34.02 ENCOUNTER FOR SUPERVISION OF NORMAL FIRST PREGNANCY IN SECOND TRIMESTER: ICD-10-CM

## 2019-09-13 DIAGNOSIS — Z34.02 ENCOUNTER FOR SUPERVISION OF NORMAL FIRST PREGNANCY IN SECOND TRIMESTER: Primary | ICD-10-CM

## 2019-09-13 LAB
BASOPHILS # BLD AUTO: 0.05 THOUSANDS/ΜL (ref 0–0.1)
BASOPHILS NFR BLD AUTO: 0 % (ref 0–1)
EOSINOPHIL # BLD AUTO: 0.19 THOUSAND/ΜL (ref 0–0.61)
EOSINOPHIL NFR BLD AUTO: 1 % (ref 0–6)
ERYTHROCYTE [DISTWIDTH] IN BLOOD BY AUTOMATED COUNT: 13.3 % (ref 11.6–15.1)
GLUCOSE 1H P 50 G GLC PO SERPL-MCNC: 109 MG/DL
HCT VFR BLD AUTO: 33.1 % (ref 34.8–46.1)
HGB BLD-MCNC: 10.7 G/DL (ref 11.5–15.4)
IMM GRANULOCYTES # BLD AUTO: 0.13 THOUSAND/UL (ref 0–0.2)
IMM GRANULOCYTES NFR BLD AUTO: 1 % (ref 0–2)
LYMPHOCYTES # BLD AUTO: 2.06 THOUSANDS/ΜL (ref 0.6–4.47)
LYMPHOCYTES NFR BLD AUTO: 15 % (ref 14–44)
MCH RBC QN AUTO: 29 PG (ref 26.8–34.3)
MCHC RBC AUTO-ENTMCNC: 32.3 G/DL (ref 31.4–37.4)
MCV RBC AUTO: 90 FL (ref 82–98)
MONOCYTES # BLD AUTO: 0.78 THOUSAND/ΜL (ref 0.17–1.22)
MONOCYTES NFR BLD AUTO: 6 % (ref 4–12)
NEUTROPHILS # BLD AUTO: 10.92 THOUSANDS/ΜL (ref 1.85–7.62)
NEUTS SEG NFR BLD AUTO: 77 % (ref 43–75)
NRBC BLD AUTO-RTO: 0 /100 WBCS
PLATELET # BLD AUTO: 296 THOUSANDS/UL (ref 149–390)
PMV BLD AUTO: 8.9 FL (ref 8.9–12.7)
RBC # BLD AUTO: 3.69 MILLION/UL (ref 3.81–5.12)
WBC # BLD AUTO: 14.13 THOUSAND/UL (ref 4.31–10.16)

## 2019-09-13 PROCEDURE — 36415 COLL VENOUS BLD VENIPUNCTURE: CPT | Performed by: OBSTETRICS & GYNECOLOGY

## 2019-09-13 PROCEDURE — 86592 SYPHILIS TEST NON-TREP QUAL: CPT

## 2019-09-13 PROCEDURE — 85025 COMPLETE CBC W/AUTO DIFF WBC: CPT | Performed by: OBSTETRICS & GYNECOLOGY

## 2019-09-13 PROCEDURE — 82950 GLUCOSE TEST: CPT

## 2019-09-15 LAB — RPR SER QL: NORMAL

## 2019-09-19 NOTE — RESULT ENCOUNTER NOTE
28wk labs reviewed  Glucola 109  Hgb 10 7, plts 296  Recommended for patient to start iron supplement once daily  Precautions provided for constipation   Patient notified and recommendations via [address]

## 2019-09-20 ENCOUNTER — ROUTINE PRENATAL (OUTPATIENT)
Dept: OBGYN CLINIC | Facility: CLINIC | Age: 39
End: 2019-09-20
Payer: COMMERCIAL

## 2019-09-20 VITALS — WEIGHT: 176.2 LBS | BODY MASS INDEX: 28.44 KG/M2 | SYSTOLIC BLOOD PRESSURE: 110 MMHG | DIASTOLIC BLOOD PRESSURE: 64 MMHG

## 2019-09-20 DIAGNOSIS — Z34.83 PRENATAL CARE, SUBSEQUENT PREGNANCY IN THIRD TRIMESTER: ICD-10-CM

## 2019-09-20 DIAGNOSIS — Z23 NEED FOR TDAP VACCINATION: ICD-10-CM

## 2019-09-20 DIAGNOSIS — Z98.890 HISTORY OF LOOP ELECTRICAL EXCISION PROCEDURE (LEEP): ICD-10-CM

## 2019-09-20 DIAGNOSIS — Z3A.28 28 WEEKS GESTATION OF PREGNANCY: Primary | ICD-10-CM

## 2019-09-20 DIAGNOSIS — O09.523 ELDERLY MULTIGRAVIDA, THIRD TRIMESTER: ICD-10-CM

## 2019-09-20 PROCEDURE — PNV: Performed by: OBSTETRICS & GYNECOLOGY

## 2019-09-20 PROCEDURE — 90472 IMMUNIZATION ADMIN EACH ADD: CPT

## 2019-09-20 PROCEDURE — 90471 IMMUNIZATION ADMIN: CPT | Performed by: OBSTETRICS & GYNECOLOGY

## 2019-09-20 PROCEDURE — 90715 TDAP VACCINE 7 YRS/> IM: CPT

## 2019-09-20 RX ORDER — DOCUSATE SODIUM 100 MG/1
100 CAPSULE, LIQUID FILLED ORAL AS NEEDED
COMMUNITY
End: 2019-12-08 | Stop reason: HOSPADM

## 2019-09-20 NOTE — PROGRESS NOTES
Red folder due today   Pt states she is going to a concert this weekend, would like to discuss concerns about this     Urine-glucose and protein negative

## 2019-09-20 NOTE — PROGRESS NOTES
44 y o    female at 30w6d EGA for PNV  BP : 110/64  [id number]  28 wk labs reviewed  28 wk folder reviewed by Fadia Guido do tdap shot today and flu shot at next visit  Discussed concert venue  Discussed iron supplementation  Hs growth US in October

## 2019-10-04 ENCOUNTER — ROUTINE PRENATAL (OUTPATIENT)
Dept: OBGYN CLINIC | Facility: CLINIC | Age: 39
End: 2019-10-04
Payer: COMMERCIAL

## 2019-10-04 VITALS — SYSTOLIC BLOOD PRESSURE: 122 MMHG | DIASTOLIC BLOOD PRESSURE: 68 MMHG | WEIGHT: 180.2 LBS | BODY MASS INDEX: 29.09 KG/M2

## 2019-10-04 DIAGNOSIS — Z23 NEED FOR INFLUENZA VACCINATION: Primary | ICD-10-CM

## 2019-10-04 DIAGNOSIS — O09.523 ELDERLY MULTIGRAVIDA, THIRD TRIMESTER: ICD-10-CM

## 2019-10-04 DIAGNOSIS — Z3A.30 30 WEEKS GESTATION OF PREGNANCY: ICD-10-CM

## 2019-10-04 PROCEDURE — 90686 IIV4 VACC NO PRSV 0.5 ML IM: CPT | Performed by: OBSTETRICS & GYNECOLOGY

## 2019-10-04 PROCEDURE — 90471 IMMUNIZATION ADMIN: CPT | Performed by: OBSTETRICS & GYNECOLOGY

## 2019-10-04 PROCEDURE — PNV: Performed by: OBSTETRICS & GYNECOLOGY

## 2019-10-04 NOTE — PROGRESS NOTES
Pt has no problems  Urine- glucose and protein negative  Flu shot given in left deltoid without difficulty, pt tolerated well

## 2019-10-04 NOTE — PROGRESS NOTES
This is a 44 y o   at 30w5d who presents for return OB visit  No complaints  Denies contractions, leakage, bleeding   Endorses fetal movement   BP: 122/68 TW lb - discussed monitoring caloric intake, healthier snacks, continuing exercise to help watch continued weight gain  Has US scheduled later this month  Flu shot given today  F/up 2 wks

## 2019-10-18 ENCOUNTER — ROUTINE PRENATAL (OUTPATIENT)
Dept: OBGYN CLINIC | Facility: CLINIC | Age: 39
End: 2019-10-18

## 2019-10-18 VITALS
BODY MASS INDEX: 29.5 KG/M2 | WEIGHT: 182.8 LBS | RESPIRATION RATE: 16 BRPM | DIASTOLIC BLOOD PRESSURE: 70 MMHG | SYSTOLIC BLOOD PRESSURE: 120 MMHG | HEART RATE: 86 BPM

## 2019-10-18 DIAGNOSIS — O09.523 ELDERLY MULTIGRAVIDA, THIRD TRIMESTER: Primary | ICD-10-CM

## 2019-10-18 DIAGNOSIS — Z3A.32 32 WEEKS GESTATION OF PREGNANCY: ICD-10-CM

## 2019-10-18 PROCEDURE — PNV: Performed by: OBSTETRICS & GYNECOLOGY

## 2019-10-24 ENCOUNTER — TELEPHONE (OUTPATIENT)
Dept: OBGYN CLINIC | Facility: CLINIC | Age: 39
End: 2019-10-24

## 2019-10-24 DIAGNOSIS — K21.9 GASTROESOPHAGEAL REFLUX DISEASE WITHOUT ESOPHAGITIS: Primary | ICD-10-CM

## 2019-10-24 RX ORDER — FAMOTIDINE 20 MG/1
20 TABLET, FILM COATED ORAL 2 TIMES DAILY
Qty: 180 TABLET | Refills: 0 | Status: SHIPPED | OUTPATIENT
Start: 2019-10-24 | End: 2020-05-01 | Stop reason: ALTCHOICE

## 2019-10-24 NOTE — TELEPHONE ENCOUNTER
Pharmacy calling states Zantac has been recalled if patient still needs to take we need to send in new prescription  Routing to provider for advice

## 2019-10-24 NOTE — TELEPHONE ENCOUNTER
Switched medication to famotidine (pepcid) 20mg bid  Please make pharmacy and patient aware of change due to recall

## 2019-10-25 ENCOUNTER — ULTRASOUND (OUTPATIENT)
Dept: PERINATAL CARE | Facility: CLINIC | Age: 39
End: 2019-10-25
Payer: COMMERCIAL

## 2019-10-25 VITALS
HEART RATE: 77 BPM | HEIGHT: 66 IN | SYSTOLIC BLOOD PRESSURE: 119 MMHG | DIASTOLIC BLOOD PRESSURE: 71 MMHG | WEIGHT: 186 LBS | BODY MASS INDEX: 29.89 KG/M2

## 2019-10-25 DIAGNOSIS — O09.523 ELDERLY MULTIGRAVIDA, THIRD TRIMESTER: ICD-10-CM

## 2019-10-25 DIAGNOSIS — Z3A.33 33 WEEKS GESTATION OF PREGNANCY: ICD-10-CM

## 2019-10-25 DIAGNOSIS — O32.2XX0 TRANSVERSE PRESENTATION, ANTEPARTUM, SINGLE OR UNSPECIFIED FETUS: Primary | ICD-10-CM

## 2019-10-25 PROCEDURE — 99212 OFFICE O/P EST SF 10 MIN: CPT | Performed by: OBSTETRICS & GYNECOLOGY

## 2019-10-25 PROCEDURE — 76816 OB US FOLLOW-UP PER FETUS: CPT | Performed by: OBSTETRICS & GYNECOLOGY

## 2019-10-25 NOTE — PROGRESS NOTES
The patient was seen today for an ultrasound  Please see ultrasound report (located under Ob Procedures) for additional details  Thank you very much for allowing us to participate in the care of this very nice patient  Should you have any questions, please do not hesitate to contact me  Johan Kincaid MD [id number]  Attending Physician, [place]

## 2019-11-01 ENCOUNTER — ROUTINE PRENATAL (OUTPATIENT)
Dept: OBGYN CLINIC | Facility: CLINIC | Age: 39
End: 2019-11-01

## 2019-11-01 VITALS — WEIGHT: 185.2 LBS | SYSTOLIC BLOOD PRESSURE: 116 MMHG | DIASTOLIC BLOOD PRESSURE: 64 MMHG | BODY MASS INDEX: 29.89 KG/M2

## 2019-11-01 DIAGNOSIS — Z3A.34 34 WEEKS GESTATION OF PREGNANCY: Primary | ICD-10-CM

## 2019-11-01 DIAGNOSIS — O09.523 ELDERLY MULTIGRAVIDA, THIRD TRIMESTER: ICD-10-CM

## 2019-11-01 DIAGNOSIS — Z34.83 PRENATAL CARE, SUBSEQUENT PREGNANCY IN THIRD TRIMESTER: ICD-10-CM

## 2019-11-01 PROCEDURE — PNV: Performed by: OBSTETRICS & GYNECOLOGY

## 2019-11-01 NOTE — PROGRESS NOTES
44 y o    female at 34w5d EGA for PNV  BP : 116/64  TWlbs  Discussed fetal position on last US (transverse), feels vtx on exam today  Will confirm at next visit  Denies contractions/lof or vaginal bleeding  Good FM  Right sided pain, likely related to baby position

## 2019-11-01 NOTE — PROGRESS NOTES
Pt c/o sharp pain on her right side  Would like to discuss the us she had done at [facility] last Friday     Urine-glucose and protein negative

## 2019-11-06 DIAGNOSIS — K21.9 GASTROESOPHAGEAL REFLUX DISEASE WITHOUT ESOPHAGITIS: ICD-10-CM

## 2019-11-06 DIAGNOSIS — Z3A.24 24 WEEKS GESTATION OF PREGNANCY: ICD-10-CM

## 2019-11-06 RX ORDER — RANITIDINE HCL 75 MG
TABLET ORAL
Qty: 60 TABLET | Refills: 2 | OUTPATIENT
Start: 2019-11-06

## 2019-11-15 ENCOUNTER — ROUTINE PRENATAL (OUTPATIENT)
Dept: OBGYN CLINIC | Facility: CLINIC | Age: 39
End: 2019-11-15

## 2019-11-15 VITALS — BODY MASS INDEX: 30.18 KG/M2 | DIASTOLIC BLOOD PRESSURE: 66 MMHG | WEIGHT: 187 LBS | SYSTOLIC BLOOD PRESSURE: 112 MMHG

## 2019-11-15 DIAGNOSIS — Z34.93 VERTEX PRESENTATION OF FETUS IN THIRD TRIMESTER: ICD-10-CM

## 2019-11-15 DIAGNOSIS — Z34.83 ENCOUNTER FOR SUPERVISION OF OTHER NORMAL PREGNANCY IN THIRD TRIMESTER: Primary | ICD-10-CM

## 2019-11-15 DIAGNOSIS — O09.523 ELDERLY MULTIGRAVIDA, THIRD TRIMESTER: ICD-10-CM

## 2019-11-15 DIAGNOSIS — Z98.890 HISTORY OF LOOP ELECTRICAL EXCISION PROCEDURE (LEEP): ICD-10-CM

## 2019-11-15 DIAGNOSIS — Z3A.36 36 WEEKS GESTATION OF PREGNANCY: ICD-10-CM

## 2019-11-15 PROCEDURE — PNV: Performed by: OBSTETRICS & GYNECOLOGY

## 2019-11-15 NOTE — PROGRESS NOTES
Routine prenatal, thinks she may have hemorrhoids  GBS due today  Thinks she may have cold sores in back of her throat, she has never experienced this before     OB Urine dip: Negative Glucose/Negative Protein

## 2019-11-15 NOTE — PROGRESS NOTES
44 y o    female at 45 5 wga EGA for PNV  BP : 112/66  TWlb    Patient is doing well  Noticed a "cold sore" type of lesion in the back of her mouth as well as a possible hemorrhoid by her rectum  - cold sore visualized  No other sores and no history of genital vesicular lesions  Advised antiseptic mouth rinse and to call the office right away if she notices any lesions near her vagina  No hx of HSV or any other lesions in past     - small external hemorrhoid visualized at anus  Not thrombosed or tender to palpation  Recommended application of Preparation H to hemorrhoid to help reduce and avoid increase in size  Discussed applying now and her increased risk of having more hemorrhoids as pregnancy progresses and during pushing  GBS collected  SVE FT/60/-4, soft, mid position  Vertex presentation confirmed by abdominal US  Follow up in 1 week

## 2019-11-17 LAB — GP B STREP DNA SPEC QL NAA+PROBE: NEGATIVE

## 2019-11-22 ENCOUNTER — ROUTINE PRENATAL (OUTPATIENT)
Dept: OBGYN CLINIC | Facility: CLINIC | Age: 39
End: 2019-11-22

## 2019-11-22 VITALS — DIASTOLIC BLOOD PRESSURE: 64 MMHG | WEIGHT: 191.4 LBS | SYSTOLIC BLOOD PRESSURE: 110 MMHG | BODY MASS INDEX: 30.89 KG/M2

## 2019-11-22 DIAGNOSIS — Z3A.37 37 WEEKS GESTATION OF PREGNANCY: ICD-10-CM

## 2019-11-22 DIAGNOSIS — Z34.93 VERTEX PRESENTATION OF FETUS IN THIRD TRIMESTER: ICD-10-CM

## 2019-11-22 DIAGNOSIS — O09.523 ELDERLY MULTIGRAVIDA, THIRD TRIMESTER: Primary | ICD-10-CM

## 2019-11-22 PROCEDURE — PNV: Performed by: OBSTETRICS & GYNECOLOGY

## 2019-11-22 NOTE — PROGRESS NOTES
44 y o    female at 39 5 wga EGA for PNV  BP : 110/64  TW  Feeling well  No complaints    GBS negative  Reviewed labor precautions and fetal kick counts  Follow-up in 1 week

## 2019-11-29 ENCOUNTER — ROUTINE PRENATAL (OUTPATIENT)
Dept: OBGYN CLINIC | Facility: CLINIC | Age: 39
End: 2019-11-29

## 2019-11-29 VITALS — WEIGHT: 193 LBS | BODY MASS INDEX: 31.15 KG/M2 | DIASTOLIC BLOOD PRESSURE: 60 MMHG | SYSTOLIC BLOOD PRESSURE: 102 MMHG

## 2019-11-29 DIAGNOSIS — Z34.93 VERTEX PRESENTATION OF FETUS IN THIRD TRIMESTER: ICD-10-CM

## 2019-11-29 DIAGNOSIS — Z34.83 PRENATAL CARE, SUBSEQUENT PREGNANCY IN THIRD TRIMESTER: Primary | ICD-10-CM

## 2019-11-29 DIAGNOSIS — O09.523 ELDERLY MULTIGRAVIDA, THIRD TRIMESTER: ICD-10-CM

## 2019-11-29 DIAGNOSIS — Z3A.38 38 WEEKS GESTATION OF PREGNANCY: ICD-10-CM

## 2019-11-29 PROCEDURE — PNV: Performed by: OBSTETRICS & GYNECOLOGY

## 2019-11-29 NOTE — PROGRESS NOTES
44 y o    female at 38w5d EGA for PNV  BP : 102/60  TWlbs  SVE /-4  Discussed expectant management versus induction of labor, risks and benefits  Patient would like to wait until after her due date for induction, discussed tentative induction on   Patient denies contractions, leakage of fluid or vaginal bleeding  She endorses good fetal movement

## 2019-12-06 ENCOUNTER — ANESTHESIA (INPATIENT)
Dept: ANESTHESIOLOGY | Facility: HOSPITAL | Age: 39
End: 2019-12-06
Payer: COMMERCIAL

## 2019-12-06 ENCOUNTER — HOSPITAL ENCOUNTER (INPATIENT)
Facility: HOSPITAL | Age: 39
LOS: 2 days | Discharge: HOME | End: 2019-12-08
Attending: OBSTETRICS & GYNECOLOGY | Admitting: OBSTETRICS & GYNECOLOGY
Payer: COMMERCIAL

## 2019-12-06 ENCOUNTER — ANESTHESIA EVENT (INPATIENT)
Dept: ANESTHESIOLOGY | Facility: HOSPITAL | Age: 39
End: 2019-12-06
Payer: COMMERCIAL

## 2019-12-06 DIAGNOSIS — Z3A.39 39 WEEKS GESTATION OF PREGNANCY: ICD-10-CM

## 2019-12-06 LAB
ABO GROUP BLD: NORMAL
BASE EXCESS BLDCOA CALC-SCNC: -11.3 MMOL/L (ref 3–11)
BASE EXCESS BLDCOV CALC-SCNC: -7.7 MMOL/L (ref 1–9)
BLD GP AB SCN SERPL QL: NEGATIVE
ERYTHROCYTE [DISTWIDTH] IN BLOOD BY AUTOMATED COUNT: 13.9 % (ref 11.6–15.1)
HCO3 BLDCOA-SCNC: 17.9 MMOL/L (ref 17.3–27.3)
HCO3 BLDCOV-SCNC: 19.4 MMOL/L (ref 12.2–28.6)
HCT VFR BLD AUTO: 36 % (ref 34.8–46.1)
HGB BLD-MCNC: 11.7 G/DL (ref 11.5–15.4)
MCH RBC QN AUTO: 29.3 PG (ref 26.8–34.3)
MCHC RBC AUTO-ENTMCNC: 32.5 G/DL (ref 31.4–37.4)
MCV RBC AUTO: 90 FL (ref 82–98)
O2 CT VFR BLDCOA CALC: 11.6 ML/DL
OXYHGB MFR BLDCOA: 51 %
OXYHGB MFR BLDCOV: 52.9 %
PCO2 BLDCOA: 52.5 MM[HG] (ref 30–60)
PCO2 BLDCOV: 45.2 MM HG (ref 27–43)
PH BLDCOA: 7.15 [PH] (ref 7.23–7.43)
PH BLDCOV: 7.25 [PH] (ref 7.19–7.49)
PLATELET # BLD AUTO: 281 THOUSANDS/UL (ref 149–390)
PMV BLD AUTO: 9.6 FL (ref 8.9–12.7)
PO2 BLDCOA: 27.4 MM HG (ref 5–25)
PO2 BLDCOV: 24.7 MM HG (ref 15–45)
RBC # BLD AUTO: 3.99 MILLION/UL (ref 3.81–5.12)
RH BLD: POSITIVE
RPR SER QL: NORMAL
SAO2 % BLDCOV: 11.9 ML/DL
SPECIMEN EXPIRATION DATE: NORMAL
WBC # BLD AUTO: 15.14 THOUSAND/UL (ref 4.31–10.16)

## 2019-12-06 PROCEDURE — 4A1HXCZ MONITORING OF PRODUCTS OF CONCEPTION, CARDIAC RATE, EXTERNAL APPROACH: ICD-10-PCS | Performed by: OBSTETRICS & GYNECOLOGY

## 2019-12-06 PROCEDURE — 82805 BLOOD GASES W/O2 SATURATION: CPT | Performed by: OBSTETRICS & GYNECOLOGY

## 2019-12-06 PROCEDURE — 0KQM0ZZ REPAIR PERINEUM MUSCLE, OPEN APPROACH: ICD-10-PCS | Performed by: OBSTETRICS & GYNECOLOGY

## 2019-12-06 PROCEDURE — 3E033VJ INTRODUCTION OF OTHER HORMONE INTO PERIPHERAL VEIN, PERCUTANEOUS APPROACH: ICD-10-PCS | Performed by: OBSTETRICS & GYNECOLOGY

## 2019-12-06 PROCEDURE — 86850 RBC ANTIBODY SCREEN: CPT | Performed by: OBSTETRICS & GYNECOLOGY

## 2019-12-06 PROCEDURE — 59400 OBSTETRICAL CARE: CPT | Performed by: OBSTETRICS & GYNECOLOGY

## 2019-12-06 PROCEDURE — 99024 POSTOP FOLLOW-UP VISIT: CPT | Performed by: OBSTETRICS & GYNECOLOGY

## 2019-12-06 PROCEDURE — 86900 BLOOD TYPING SEROLOGIC ABO: CPT | Performed by: OBSTETRICS & GYNECOLOGY

## 2019-12-06 PROCEDURE — 86592 SYPHILIS TEST NON-TREP QUAL: CPT | Performed by: OBSTETRICS & GYNECOLOGY

## 2019-12-06 PROCEDURE — 86901 BLOOD TYPING SEROLOGIC RH(D): CPT | Performed by: OBSTETRICS & GYNECOLOGY

## 2019-12-06 PROCEDURE — 99214 OFFICE O/P EST MOD 30 MIN: CPT

## 2019-12-06 PROCEDURE — 85027 COMPLETE CBC AUTOMATED: CPT | Performed by: OBSTETRICS & GYNECOLOGY

## 2019-12-06 PROCEDURE — G0463 HOSPITAL OUTPT CLINIC VISIT: HCPCS

## 2019-12-06 RX ORDER — BISACODYL 10 MG
10 SUPPOSITORY, RECTAL RECTAL DAILY PRN
Status: DISCONTINUED | OUTPATIENT
Start: 2019-12-06 | End: 2019-12-07

## 2019-12-06 RX ORDER — ACETAMINOPHEN 325 MG/1
650 TABLET ORAL EVERY 4 HOURS PRN
Status: DISCONTINUED | OUTPATIENT
Start: 2019-12-06 | End: 2019-12-08 | Stop reason: HOSPADM

## 2019-12-06 RX ORDER — CALCIUM CARBONATE 200(500)MG
1000 TABLET,CHEWABLE ORAL DAILY PRN
Status: DISCONTINUED | OUTPATIENT
Start: 2019-12-06 | End: 2019-12-08 | Stop reason: HOSPADM

## 2019-12-06 RX ORDER — DOCUSATE SODIUM 100 MG/1
100 CAPSULE, LIQUID FILLED ORAL 2 TIMES DAILY PRN
Status: DISCONTINUED | OUTPATIENT
Start: 2019-12-06 | End: 2019-12-08 | Stop reason: HOSPADM

## 2019-12-06 RX ORDER — FAMOTIDINE 20 MG/1
20 TABLET, FILM COATED ORAL 2 TIMES DAILY
Status: DISCONTINUED | OUTPATIENT
Start: 2019-12-06 | End: 2019-12-08 | Stop reason: HOSPADM

## 2019-12-06 RX ORDER — DIAPER,BRIEF,INFANT-TODD,DISP
1 EACH MISCELLANEOUS AS NEEDED
Status: DISCONTINUED | OUTPATIENT
Start: 2019-12-06 | End: 2019-12-08 | Stop reason: HOSPADM

## 2019-12-06 RX ORDER — BUPIVACAINE HYDROCHLORIDE 7.5 MG/ML
INJECTION, SOLUTION INTRASPINAL AS NEEDED
Status: DISCONTINUED | OUTPATIENT
Start: 2019-12-06 | End: 2019-12-06 | Stop reason: SURG

## 2019-12-06 RX ORDER — SODIUM CHLORIDE, SODIUM LACTATE, POTASSIUM CHLORIDE, CALCIUM CHLORIDE 600; 310; 30; 20 MG/100ML; MG/100ML; MG/100ML; MG/100ML
125 INJECTION, SOLUTION INTRAVENOUS CONTINUOUS
Status: DISCONTINUED | OUTPATIENT
Start: 2019-12-06 | End: 2019-12-06

## 2019-12-06 RX ORDER — DOCUSATE SODIUM 100 MG/1
100 CAPSULE, LIQUID FILLED ORAL 2 TIMES DAILY
Status: DISCONTINUED | OUTPATIENT
Start: 2019-12-06 | End: 2019-12-08 | Stop reason: HOSPADM

## 2019-12-06 RX ORDER — OXYTOCIN/RINGER'S LACTATE 30/500 ML
1-30 PLASTIC BAG, INJECTION (ML) INTRAVENOUS
Status: DISCONTINUED | OUTPATIENT
Start: 2019-12-06 | End: 2019-12-06

## 2019-12-06 RX ORDER — ONDANSETRON 2 MG/ML
4 INJECTION INTRAMUSCULAR; INTRAVENOUS EVERY 8 HOURS PRN
Status: DISCONTINUED | OUTPATIENT
Start: 2019-12-06 | End: 2019-12-08 | Stop reason: HOSPADM

## 2019-12-06 RX ORDER — SIMETHICONE 80 MG
80 TABLET,CHEWABLE ORAL 4 TIMES DAILY PRN
Status: DISCONTINUED | OUTPATIENT
Start: 2019-12-06 | End: 2019-12-08 | Stop reason: HOSPADM

## 2019-12-06 RX ORDER — DIPHENHYDRAMINE HCL 25 MG
25 TABLET ORAL EVERY 6 HOURS PRN
Status: DISCONTINUED | OUTPATIENT
Start: 2019-12-06 | End: 2019-12-08 | Stop reason: HOSPADM

## 2019-12-06 RX ORDER — IBUPROFEN 600 MG/1
600 TABLET ORAL EVERY 6 HOURS PRN
Status: DISCONTINUED | OUTPATIENT
Start: 2019-12-06 | End: 2019-12-08 | Stop reason: HOSPADM

## 2019-12-06 RX ORDER — ONDANSETRON 2 MG/ML
4 INJECTION INTRAMUSCULAR; INTRAVENOUS EVERY 6 HOURS PRN
Status: DISCONTINUED | OUTPATIENT
Start: 2019-12-06 | End: 2019-12-06

## 2019-12-06 RX ADMIN — ROPIVACAINE HYDROCHLORIDE: 2 INJECTION, SOLUTION EPIDURAL; INFILTRATION at 11:25

## 2019-12-06 RX ADMIN — BENZOCAINE AND LEVOMENTHOL: 200; 5 SPRAY TOPICAL at 20:34

## 2019-12-06 RX ADMIN — Medication 2 MILLI-UNITS/MIN: at 05:57

## 2019-12-06 RX ADMIN — BUPIVACAINE HYDROCHLORIDE IN DEXTROSE 0.5 ML: 7.5 INJECTION, SOLUTION SUBARACHNOID at 11:06

## 2019-12-06 RX ADMIN — HYDROCORTISONE 1 APPLICATION: 1 CREAM TOPICAL at 20:34

## 2019-12-06 RX ADMIN — SODIUM CHLORIDE, SODIUM LACTATE, POTASSIUM CHLORIDE, AND CALCIUM CHLORIDE 125 ML/HR: .6; .31; .03; .02 INJECTION, SOLUTION INTRAVENOUS at 08:15

## 2019-12-06 RX ADMIN — FAMOTIDINE 20 MG: 20 TABLET ORAL at 10:03

## 2019-12-06 RX ADMIN — SODIUM CHLORIDE, SODIUM LACTATE, POTASSIUM CHLORIDE, AND CALCIUM CHLORIDE 999 ML/HR: .6; .31; .03; .02 INJECTION, SOLUTION INTRAVENOUS at 04:20

## 2019-12-06 RX ADMIN — DOCUSATE SODIUM 100 MG: 100 CAPSULE, LIQUID FILLED ORAL at 20:34

## 2019-12-06 RX ADMIN — WITCH HAZEL 1 PAD: 500 SOLUTION RECTAL; TOPICAL at 20:34

## 2019-12-06 NOTE — ANESTHESIA PREPROCEDURE EVALUATION
Review of Systems/Medical History          Cardiovascular   Pulmonary  Not a smoker , No recent URI ,        GI/Hepatic            Endo/Other     GYN       Hematology   Musculoskeletal  No back pain ,        Neurology   Psychology           Physical Exam    Airway    Mallampati score: I  TM Distance: >3 FB  Neck ROM: full     Dental   No notable dental hx     Cardiovascular      Pulmonary      Other Findings        Anesthesia Plan  ASA Score- 2     Anesthesia Type- epidural and spinal with ASA Monitors  Additional Monitors:   Airway Plan:         Plan Factors-Patient not instructed to abstain from smoking on day of procedure  Patient did not smoke on day of surgery  Induction-     Postoperative Plan-     Informed Consent- Anesthetic plan and risks discussed with spouse  Lab Results   Component Value Date    HCT 36 0 12/06/2019    HGB 11 7 12/06/2019     12/06/2019    WBC 15 14 (H) 12/06/2019   Epidural discussed with patient  Side effects, including post dural puncture headache discussed  All questions answered  Consent given by patient  Spinal technique discussed with Patient   Discussed side effects including post dural puncture headache with patient  All questions answered  Consent given

## 2019-12-06 NOTE — OB LABOR/OXYTOCIN SAFETY PROGRESS
Labor Progress Note - Raisa Ferreira 44 y o  female MRN: [id number]    Unit/Bed#: -01 Encounter: [id number]       Contraction Frequency (minutes): 5(irritability)  Contraction Quality: Mild      Dilation: 2        Effacement (%): 70  Station: -2  Baseline Rate: 130 bpm  Fetal Heart Rate: 147 BPM  FHR Category: Category I             Notes/comments:   Pt comfortable without epidural    Pt making unchanged in this cervical check  Plan to start pitocin  FHT:  category I,  Base line 1, moderate variability, ctx q 6 min, Currently without decelerations      Will recheck pt in 2 hours, sooner if patient uncomfortable      Bushra Ruiz MD 12/6/2019 5:46 AM

## 2019-12-06 NOTE — DISCHARGE INSTRUCTIONS
Self Care After Delivery   AMBULATORY CARE:   The postpartum period  is the period of time from delivery to about 6 weeks  During this time you may experience many physical and emotional changes  It is important to understand what is normal and when you need to call your healthcare provider  It is also important to know how to care for yourself during this time  Call 911 for any of the following:   · You soak through 1 pad in 15 minutes, have blurry vision, clammy or pale skin, and feel faint  · You faint or lose consciousness  · Your heart is beating faster than normal      · You have trouble breathing  · You cough up blood  Seek care immediately if:   · You soak through 1 or more pads in an hour, or pass blood clots larger than a quarter from your vagina  · Your leg is painful, red, and larger than normal      · You have severe abdominal pain  · You have a bad headache or changes in your vision  · Your episiotomy or C section incision is red, swollen, bleeding, or draining pus  · Your incision comes apart  · You see or hear things that are not there, or have thoughts of harming yourself or your baby  Contact your obstetrician or midwife if:   · You have a fever  · You have new or worsening pain in your abdomen or vagina  · You continue to have the baby blues 10 days after you deliver  · You have trouble sleeping  · You have foul-smelling discharge from your vagina  · You have pain or burning when you urinate  · You do not have a bowel movement for 3 days or more  · You have nausea or are vomiting  · You have hard lumps or red streaks over your breasts  · You have cracked nipples or bleed from your nipples  · You have questions or concerns about your condition or care  Physical changes:   The following are normal changes after you give birth:  · Pain in the area between your anus and vagina    · Breast pain    · Constipation or hemorrhoids    · Hot or cold flashes    · Vaginal bleeding or discharge    · Mild to moderate abdominal cramping    · Difficulty controlling bowel movements or urine  Emotional changes: The following are symptoms of the baby blues, or normal emotions after you give birth  The changes in your emotions may be caused by a drop in hormone levels after you deliver  If these symptoms last longer than 1 to 2 weeks after you give birth, you may have postpartum depression  · Feeling irritable    · Feeling sad    · Crying for no reason    · Feeling anxious  Breast care for nursing mothers: You may have sore breasts for 3 to 6 days after you give birth  This happens as your milk begins to fill your breasts  You may also have sore breasts if you do not breastfeed frequently  Do the following to care for your breasts:  · Apply a moist, warm, compress to your breast as directed  This may help soothe your breasts  Make sure the washcloth is not too hot before you apply it to your breast      · Nurse your baby or pump your milk frequently  This may prevent clogged milk ducts  Ask your healthcare provider how often to nurse or pump  · Massage your breasts as directed  This may help increase your milk flow  Gently rub your breasts in a circular motion before you breastfeed  You may need to gently squeeze your breast or nipple to help release milk  You can also use a breast pump to help release milk from your breast      · Wash your breasts with warm water only  Do not put soap on your nipples  Soap may cause your nipples to become dry  · Apply lanolin cream to your nipples as directed  Lanolin cream may add moisture to your skin and prevent nipple dryness  Always  wash off lanolin cream with warm water before you breastfeed  · Place pads in your bra  Your nipples may leak milk when you are not breastfeeding  You can place pads inside of your bra to help prevent leaking onto your clothing   Ask your healthcare provider where to purchase bra pads  · Get breastfeeding support if needed  There are healthcare providers who can answer questions about breastfeeding and provide you with support  Ask your healthcare provider who you can contact if you need breastfeeding support  Breast care for non-nursing mothers:  Milk will fill your breasts even if you bottle feed your baby  Do the following to help stop your milk from filling your breasts and causing pain:  · Wear a bra with support at all times  A sports bra or a tight-fitting bra will help stop your milk from coming in  · Apply ice on each breast for 15 to 20 minutes every hour or as directed  Use an ice pack, or put crushed ice in a plastic bag  Cover it with a towel  Ice helps your milk ducts shrink  · Keep your breasts away from warm water  Warm water will make it easier for milk to fill your breasts  Stand with your breasts away from warm water in the shower  · Limit how much you touch your breasts  This will prevent them from filling with milk  Perineum care: Your perineum is the area between your rectum and vagina  It is normal to have swelling and pain in this area after you give birth  If you had an episiotomy, your healthcare provider may give you special instructions  · Clean your perineum after you use the bathroom  This may prevent infection and help with healing  Use a spray bottle with warm water to clean your perineum  You may also gently spray warm water against your perineum when you urinate  Always wipe front to back  · Take a sitz bath as directed  A sitz bath may help relieve swelling and pain  Fill your bath tub or bucket with water up to your hips and sit in the water  Use cold water for 2 days after you deliver  Then use warm water  Ask your healthcare provider for more information about a sitz bath  · Apply ice packs for the first 24 hours or as directed  Use a plastic glove filled with ice or buy an ice pack   Wrap the ice pack or plastic glove in a small towel or wash cloth  Place the ice pack on your perineum for 20 minutes at a time  · Sit on a donut-shaped pillow  This may relieve pressure on your perineum when you sit  · Use wipes with medicine or take pills as directed  Your healthcare provider may tell you to use witch hazel pads  You can place witch hazel pads in the refrigerator before you apply them to your perineum  He may also tell you to take NSAIDs  Ask your healthcare provider how often to take pills or use wipes with medicine  · Do not go swimming or take tub baths for 4 to 6 weeks or as directed  This will help prevent an infection in your vagina or uterus  Bowel and bladder care: It may take 3 to 5 days to have a bowel movement after you deliver your baby  You can do the following to prevent or manage constipation, and get control of your bowel or bladder:  · Take stool softeners as directed  A stool softener is medicine that will make your bowel movements softer  This may prevent or relieve constipation  A stool softener may also make bowel movements less painful  · Drink plenty of liquids  Ask how much liquid to drink each day and which liquids are best for you  Liquids may help prevent constipation  · Eat foods high in fiber  Examples include fruits, vegetables, grains, beans, and lentils  Ask your healthcare provider how much fiber you need each day  Fiber may prevent constipation  · Do Kegel exercises as directed  Kegel exercises will help strengthen the muscles that control bowel movements and urination  Ask your healthcare provider for more information on Kegel exercises  · Apply cold compresses or medicine to hemorrhoids as directed  This may relieve swelling and pain  Your healthcare provider may tell you to apply ice or wipes with medicine to your hemorrhoids  He may also tell you to use a sitz bath   Ask your healthcare for more information on how to manage hemorrhoids  Nutrition:  Good nutrition is important in the postpartum period  It will help you return to a healthy weight, increase your energy levels, and prevent constipation  It will also help you get enough nutrients and calories if you are going to breastfeed your baby  · Eat a variety of healthy foods  Healthy foods include fruits, vegetables, whole-grain breads, low-fat dairy products, beans, lean meats, and fish  You may need 500 to 700 additional calories each day if you breastfeed your baby  You may also need extra protein  · Limit foods with added sugar and high amounts of fat  These foods are high in calories and low in healthy nutrients  Read food labels so you know how much sugar and fat is in the food you want to eat  · Drink 8 to 10 glasses of water per day  Water will help you make plenty of milk for your baby  It will also help prevent constipation  Drink a glass of water every time you breastfeed your baby  · Take vitamins as directed  Ask your healthcare provider what vitamins you need  · Limit caffeine and alcohol if you are breastfeeding  Caffeine and alcohol can get into your breast milk  Caffeine and alcohol can make your baby fussy  They can also interfere with your baby's sleep  Ask your healthcare provider if you can drink alcohol or caffeine  Rest and sleep: You may feel very tired in the postpartum period  Enough sleep will help you heal and give you energy to care for your baby  The following may help you get sleep and rest:  · Nap when your baby naps  Your baby may nap several times during the day  Get rest during this time  · Limit visitors  Many people may want to see you and your baby  Ask friends or family to visit on different days  This will give you time to rest      · Do not plan too much for one day  Put off household chores so that you have time to rest  Gradually do more each day  · Ask for help from family, friends, or neighbors    Ask them to help you with laundry, cleaning, or errands  Also ask someone to watch the baby while you take a nap or relax  Ask your partner to help with the care of your baby  Pump some of your breast milk so your partner can feed your baby during the night  Exercise after delivery:  Wait until your healthcare provider says it is okay to exercise  Exercise can help you lose weight, increase your energy levels, and manage your mood  It can also prevent constipation and blood clots  Start with gentle exercises such as walking  Do more as you have more energy  You may need to avoid abdominal exercises for 1 to 2 weeks after you deliver  Talk to your healthcare provider about an exercise plan that is right for you  Sexual activity after delivery:   · Do not have sex until your healthcare provider says it is okay  You may need to wait 4 to 6 weeks before you have sex  This may prevent infection and allow time to heal      · Your menstrual cycle may begin as soon as 3 weeks after you deliver  Your period may be delayed if you breastfeed your baby  You can become pregnant before you get your first postpartum period  Talk to your healthcare provider about birth control that is right for you  Some types of birth control are not safe during breastfeeding  For support and more information:  Join a support group for new mothers  Ask for help from family and friends with chores, errands, and care of your baby  · Office of Women's Health,  Department of Health and Human Services  [address], [address] [place] [address]  [address], [address] [place]  [place] , [address]  Phone: 7- 309 - [phone]  Web Address: www womenshealth gov  · [facility] Postpartum [address] [facility] , [address] [place] [address]  [address]  Web Address: [employer] [facility] [employer] [facility]  org/pregnancy/postpartum-care  aspx  Follow up with your obstetrician or midwife as directed:   You will need to follow up with your healthcare provider in 2 to 6 weeks after delivery  Write down your questions so you remember to ask them at your visits  © 2017 [employer] Information is for End User's use only and may not be sold, redistributed or otherwise used for commercial purposes  All illustrations and images included in CareNotes® are the copyrighted property of A D A M , Inc  or Russell Camarena  The above information is an  only  It is not intended as medical advice for individual conditions or treatments  Talk to your doctor, nurse or pharmacist before following any medical regimen to see if it is safe and effective for you

## 2019-12-06 NOTE — ANESTHESIA PROCEDURE NOTES
CSE Block    Patient location during procedure: OB  Start time: 12/6/2019 10:56 AM  Reason for block: procedure for pain and at surgeon's request  Staffing  Anesthesiologist: Richard Wallace MD  Performed: anesthesiologist   Preanesthetic Checklist  Completed: patient identified, surgical consent, pre-op evaluation, timeout performed, IV checked, risks and benefits discussed and monitors and equipment checked  CSE  Patient position: sitting  Prep: Betadine and site prepped and draped  Patient monitoring: heart rate, continuous pulse ox and frequent blood pressure checks  Approach: midline  Spinal Needle  Needle type: pencil-tip   Needle gauge: 27 G  Needle length: 10 cm  Epidural Needle  Injection technique: RAQUEL air  Needle type: Tuohy   Epidural needle gauge: 17G  Needle insertion depth: 6 cm  Location: lumbar (1-5) (L4/5)  Catheter  Catheter type: side hole  Catheter size: 19G  Catheter at skin depth: 12 cm  Assessment  Injection Assessment:  negative aspiration for heme, no pain on injection, no paresthesia on injection and positive aspiration for clear CSF  Additional Notes  Epidural X 1 attempt  L/P with 27G, After Clear CSF aspirated  0 5ml  0 75% Bupivicaine in 8 25% D/W diluted up to 2 0 mls  with N/S,injected intrathecally  27G needle out  After no aspiration of CSF or Heme, 3mls  N/S injected via 17G Needle  19 G Epidural catheter inserted w/o incident  Taped  Catheter flushed with 2 mls  N/S after no asp  CSF or Heme  Pt  Supine  R Hip wedge  Good pain relief  Bilat  Leg numbness, R=L   Started infusion of 0 2% Ropivicaine at 11 mls /hr

## 2019-12-06 NOTE — OB LABOR/OXYTOCIN SAFETY PROGRESS
Oxytocin Safety Progress Check Note - Carina Maldonado 44 y o  female MRN: [id number]    Unit/Bed#: -01 Encounter: [id number]    Dose (kierra-units/min) Oxytocin: 20 kierra-units/min  Contraction Frequency (minutes): 2-3  Contraction Quality: Moderate  Tachysystole: No   Dilation: 6        Effacement (%): 100  Station: -1  Baseline Rate: 130 bpm  Fetal Heart Rate: 130 BPM  FHR Category: Category II             Notes/comments:   Evaluated patient after 2 hours  Started having a few late decelerations, moderate variability the rest of the tracing  Found to be changed to 6cm dilated, 100% effaced  Will continue to monitor, and continue pitocin titration at that time      Discussed with Dr Eladio Tran MD 12/6/2019 2:18 PM

## 2019-12-06 NOTE — OB LABOR/OXYTOCIN SAFETY PROGRESS
Oxytocin Safety Progress Check Note - Iraida Rosa 44 y o  female MRN: [id number]    Unit/Bed#: -01 Encounter: [id number]    Dose (kierra-units/min) Oxytocin: 14 kierra-units/min  Contraction Frequency (minutes): 2-3  Contraction Quality: Moderate  Tachysystole: No   Dilation: 3-4        Effacement (%): 70  Station: -2  Baseline Rate: 135 bpm  Fetal Heart Rate: 135 BPM  FHR Category: Category I             Notes/comments:   Evaluated patient after 2 hours  More uncomfortable, desiring epidural  Found to be changed to 3 5cm dilated  Category I tracing   Will continue pitocin titration, would be good candidate for epidural     Discussed with Dr Carmine Peabody, MD 12/6/2019 10:32 AM

## 2019-12-06 NOTE — DISCHARGE SUMMARY
Discharge Summary - Chace Adams 44 y o  female MRN: [id number]    Unit/Bed#: -01 Encounter: [id number]    Admission Date: 2019     Discharge Date: 19    Admitting Diagnosis:   Patient Active Problem List   Diagnosis    39 weeks gestation of pregnancy    Elderly multigravida, third trimester    History of loop electrical excision procedure (LEEP)    Hereditary disease in family possibly affecting fetus    Prenatal care, subsequent pregnancy in third trimester    Vertex presentation of fetus in third trimester     (spontaneous vaginal delivery)       Discharge Diagnosis:   Same, delivered    Procedures:   spontaneous vaginal delivery    Admitting Attending: Dr Lata Lizama MD  Delivery Attending: Dr Elana Ordoñez  Discharge Attending: Dr Sreedhar Hodge Course:     Chace Adams is a 44 y o  Natalio Nida who was admitted with PROM  She received pitocin for induction and progressed well  She then underwent an uncomplicated spontaneous vaginal delivery and delivered a viable female  at 200  APGARS were 9, 9 at 1 and 5 minutes, respectively   weighed 7lb 2oz   was then transferred to  nursery  Patient tolerated the procedure well and was transferred to postpartum in stable condition  The patient's post partum course was unremarkable  On day of discharge, she was ambulating and able to reasonably perform all ADLs  She was voiding and had appropriate bowel function  Pain was well controlled  She was discharged home on postpartum day #2 without complications  Patient was instructed to follow up with her OB as an outpatient and was given appropriate warnings to call provider if she develops signs of infection or uncontrolled pain  Condition at discharge:   good     Disposition:   Home    Planned Readmission:   No    Discharge Medications:   Please see after visit summary for full list of discharge medications        Discharge instructions :   -Do not place anything (no partner, tampons or douche) in your vagina for 6 weeks  -You may walk for exercise for the first 6 weeks then gradually return to your usual activities    -Please do not drive for 1 week if you have no stitches and for 2 weeks if you have stitches or underwent a  delivery     -You may take baths or shower per your preference    -Please look at your bust (breasts) in the mirror daily and call provider for redness or tenderness or increased warmth  - If you have had a  please look at your incision daily as well and call provider for increasing redness or steady drainage from the incision    -Please call your provider if temperature > 100 4*F or 38* C, worsening pain or a foul discharge      Tasha Gomes MD  19

## 2019-12-06 NOTE — OB LABOR/OXYTOCIN SAFETY PROGRESS
Oxytocin Safety Progress Check Note - Conrad Steen 44 y o  female MRN: [id number]    Unit/Bed#: -01 Encounter: [id number]    Dose (kierra-units/min) Oxytocin: 10 kierra-units/min  Contraction Frequency (minutes): 2-3  Contraction Quality: Mild  Tachysystole: No   Dilation: 2        Effacement (%): 70  Station: -2  Baseline Rate: 130 bpm  Fetal Heart Rate: 130 BPM  FHR Category: Category I             Notes/comments:   Evaluated patient after 2 hours  Found to be unchanged on cervical exam  Still comfortable, but starting to feel contractions a little more  Considering epidural  Will continue pitocin titration and continue to monitor      Discussed with Dr Jimena Thornton MD 12/6/2019 8:18 AM

## 2019-12-06 NOTE — OB LABOR/OXYTOCIN SAFETY PROGRESS
Oxytocin Safety Progress Check Note - Jeromy Piedra 44 y o  female MRN: [id number]    Unit/Bed#: -01 Encounter: [id number]    Dose (kierra-units/min) Oxytocin: 16 kierra-units/min  Contraction Frequency (minutes): 1 5-3 5  Contraction Quality: Moderate  Tachysystole: No   Dilation: 4        Effacement (%): 90  Station: -1  Baseline Rate: 130 bpm  Fetal Heart Rate: 135 BPM  FHR Category: Category II             Notes/comments:   Evaluated patient after epidural placed  More comfortable  Noted to have some intermittent early decelerations and occasional non-uniform and non-recurrent variable decelerations, Category II tracing  On cervical exam now 4cm, effaced to 90% and more anterior  Will continue pitocin titration and continue to monitor      Discussed with Dr Lalita Newby MD 12/6/2019 11:52 AM

## 2019-12-06 NOTE — H&P
H&P Exam - Obstetrics   Dusty Long 44 y o  female MRN: [id number]  Unit/Bed#: LD Triage 4 Encounter: [id number]      History of Present Illness     Chief Complaint: SROM    HPI:  Dusty Long is a 44 y o   female with an CHRISTOPHE of 2019, by Last Menstrual Period at 39w5d weeks gestation who is being admitted for PROM  Patient states leakage of fluid since 1:00 a m , she feels abdominal cramping every 5 minutes  Denies vaginal bleeding or decreased fetal movement  Pregnancy complicated by advanced maternal age, obesity and family history with congenital heart disease, last estimated fetal weight in ultrasound of  2348g, 49%  Contractions: yes  Loss of fluid: yes  Vaginal bleeding: no  Fetal movement: yes    She is [place] patient  PREGNANCY COMPLICATIONS:   1) AMA  2) Family history with congenital heart disease  3) Obesity  4) Hx of LEEP    OB History    Para Term  AB Living   2       1     SAB TAB Ectopic Multiple Live Births                  # Outcome Date GA Lbr Chris/2nd Weight Sex Delivery Anes PTL Lv   2 Current            1 AB 2018 8w0d              Baby complications/comments: None    Review of Systems   Constitutional: Negative  HENT: Negative  Eyes: Negative  Respiratory: Negative  Cardiovascular: Negative  Gastrointestinal: Negative  Endocrine: Negative  Genitourinary: Negative  Musculoskeletal: Negative  Skin: Negative  Allergic/Immunologic: Negative  Neurological: Negative  Hematological: Negative  Psychiatric/Behavioral: Negative            Historical Information   Past Medical History:   Diagnosis Date    Abnormal Pap smear of cervix     HPV (human papilloma virus) infection      Past Surgical History:   Procedure Laterality Date    CERVICAL BIOPSY  W/ LOOP ELECTRODE EXCISION          TONSILLECTOMY      WISDOM TOOTH EXTRACTION       Social History   Social History     Substance and Sexual Activity Alcohol Use No    Comment: socially prior to confirmed pregnancy     Social History     Substance and Sexual Activity   Drug Use No     Social History     Tobacco Use   Smoking Status Never Smoker   Smokeless Tobacco Never Used     Family History: non-contributory    Meds/Allergies      Medications Prior to Admission   Medication    docusate sodium (COLACE) 100 mg capsule    famotidine (PEPCID) 20 mg tablet    FOLIC ACID PO    IRON PO    Prenatal MV & Min w/FA-DHA (PRENATAL ADULT GUMMY/DHA/FA) 0 4-25 MG CHEW        Allergies   Allergen Reactions    Mold Extract [Trichophyton] Sneezing    Other      Annotation - [id number]: [place]    Pollen Extract        OBJECTIVE:    Vitals: Blood pressure 119/74, pulse 86, temperature 98 8 °F (37 1 °C), temperature source Oral, resp  rate 16, height 5' 6" (1 676 m), weight 88 9 kg (196 lb), last menstrual period 03/03/2019, unknown if currently breastfeeding  Body mass index is 31 64 kg/m²  Physical Exam   Constitutional: She is oriented to person, place, and time  She appears well-developed and well-nourished  HENT:   Head: Normocephalic and atraumatic  Eyes: Pupils are equal, round, and reactive to light  Conjunctivae are normal    Neck: Normal range of motion  Cardiovascular: Normal rate, regular rhythm, normal heart sounds and intact distal pulses  Pulmonary/Chest: Effort normal    Abdominal: Soft  Genitourinary: Vagina normal    Musculoskeletal: Normal range of motion  Neurological: She is alert and oriented to person, place, and time  Psychiatric: She has a normal mood and affect         Ferning: positive  Nitrazine: positive    Cervix:  Dilation: 2  Effacement (%): 70  Station: -2    Fetal heart rate: 125       Novelty: contractions every 5 minutes       EFW: 7 5    GBS: negative    Prenatal Labs:   Blood Type:   Lab Results   Component Value Date/Time    ABO Grouping O 05/23/2019 11:41 AM    ABO Grouping O 11/14/2018 09:57 AM     , D (Rh type): Lab Results   Component Value Date/Time    Rh Type Positive 2019 11:41 AM     ,HCT/HGB:   Lab Results   Component Value Date/Time    Hematocrit 33 1 (L) 2019 12:17 PM    Hemoglobin 10 7 (L) 2019 12:17 PM      , MCV:   Lab Results   Component Value Date/Time    MCV 90 2019 12:17 PM      , Platelets:   Lab Results   Component Value Date/Time    Platelets 360  12:17 PM      , 1 hour Glucola:   Lab Results   Component Value Date/Time    Glucose 109 2019 12:17 PM   , VDRL/RPR:   Lab Results   Component Value Date/Time    RPR Non-Reactive 2019 12:17 PM      ,Group B Strep:    Lab Results   Component Value Date/Time    Strep Grp B YASEMIN Negative 11/15/2019 09:30 AM        Rubella: IMMUNE  HIV: NEGATIVE  hepatitis-B: NEGATIVE      Assessment/Plan     ASSESSMENT:  44yo  at 39w5d weeks gestation who is being admitted for PROM  Pregnancy complicated by: AMA, obesity, hx of LEEP and cardiac anomalies in family   SVE: 2/70/-2  FHT: 125  Clinical EFW: 7 5  ; Vertex confirmed by U/S  GBS status: negative   Rh: positive    PLAN:    - Admit  - CBC, RPR, Blood Type  - Start with expectant management  - GBS negative status: no PCN for prophylaxis   - Analgesia and/or epidural at patient request  - Anticipate     Discussed with Dr Diann Wall      This patient will be an INPATIENT  and I certify the anticipated length of stay is >2 Midnights      Lindsey Vance MD  2019  3:53 AM

## 2019-12-06 NOTE — PLAN OF CARE
Problem: PAIN - ADULT  Goal: Verbalizes/displays adequate comfort level or baseline comfort level  Description  Interventions:  - Encourage patient to monitor pain and request assistance  - Assess pain using appropriate pain scale  - Administer analgesics based on type and severity of pain and evaluate response  - Implement non-pharmacological measures as appropriate and evaluate response  - Consider cultural and social influences on pain and pain management  - Notify physician/advanced practitioner if interventions unsuccessful or patient reports new pain  Outcome: Progressing     Problem: INFECTION - ADULT  Goal: Absence or prevention of progression during hospitalization  Description  INTERVENTIONS:  - Assess and monitor for signs and symptoms of infection  - Monitor lab/diagnostic results  - Monitor all insertion sites, i e  indwelling lines, tubes, and drains  - Monitor endotracheal if appropriate and nasal secretions for changes in amount and color  - Drifting appropriate cooling/warming therapies per order  - Administer medications as ordered  - Instruct and encourage patient and family to use good hand hygiene technique  - Identify and instruct in appropriate isolation precautions for identified infection/condition  Outcome: Progressing  Goal: Absence of fever/infection during neutropenic period  Description  INTERVENTIONS:  - Monitor WBC    Outcome: Progressing     Problem: SAFETY ADULT  Goal: Patient will remain free of falls  Description  INTERVENTIONS:  - Assess patient frequently for physical needs  -  Identify cognitive and physical deficits and behaviors that affect risk of falls    -  Drifting fall precautions as indicated by assessment   - Educate patient/family on patient safety including physical limitations  - Instruct patient to call for assistance with activity based on assessment  - Modify environment to reduce risk of injury  - Consider OT/PT consult to assist with strengthening/mobility  Outcome: Progressing  Goal: Maintain or return to baseline ADL function  Description  INTERVENTIONS:  -  Assess patient's ability to carry out ADLs; assess patient's baseline for ADL function and identify physical deficits which impact ability to perform ADLs (bathing, care of mouth/teeth, toileting, grooming, dressing, etc )  - Assess/evaluate cause of self-care deficits   - Assess range of motion  - Assess patient's mobility; develop plan if impaired  - Assess patient's need for assistive devices and provide as appropriate  - Encourage maximum independence but intervene and supervise when necessary  - Involve family in performance of ADLs  - Assess for home care needs following discharge   - Consider OT consult to assist with ADL evaluation and planning for discharge  - Provide patient education as appropriate  Outcome: Progressing  Goal: Maintain or return mobility status to optimal level  Description  INTERVENTIONS:  - Assess patient's baseline mobility status (ambulation, transfers, stairs, etc )    - Identify cognitive and physical deficits and behaviors that affect mobility  - Identify mobility aids required to assist with transfers and/or ambulation (gait belt, sit-to-stand, lift, walker, cane, etc )  - Carlotta fall precautions as indicated by assessment  - Record patient progress and toleration of activity level on Mobility SBAR; progress patient to next Phase/Stage  - Instruct patient to call for assistance with activity based on assessment  - Consider rehabilitation consult to assist with strengthening/weightbearing, etc   Outcome: Progressing     Problem: Knowledge Deficit  Goal: Patient/family/caregiver demonstrates understanding of disease process, treatment plan, medications, and discharge instructions  Description  Complete learning assessment and assess knowledge base    Interventions:  - Provide teaching at level of understanding  - Provide teaching via preferred learning methods  Outcome: Progressing     Problem: DISCHARGE PLANNING  Goal: Discharge to home or other facility with appropriate resources  Description  INTERVENTIONS:  - Identify barriers to discharge w/patient and caregiver  - Arrange for needed discharge resources and transportation as appropriate  - Identify discharge learning needs (meds, wound care, etc )  - Arrange for interpretive services to assist at discharge as needed  - Refer to Case Management Department for coordinating discharge planning if the patient needs post-hospital services based on physician/advanced practitioner order or complex needs related to functional status, cognitive ability, or social support system  Outcome: Progressing     Problem: BIRTH - VAGINAL/ SECTION  Goal: Fetal and maternal status remain reassuring during the birth process  Description  INTERVENTIONS:  - Monitor vital signs  - Monitor fetal heart rate  - Monitor uterine activity  - Monitor labor progression (vaginal delivery)  - DVT prophylaxis  - Antibiotic prophylaxis  Outcome: Progressing  Goal: Emotionally satisfying birthing experience for mother/fetus  Description  Interventions:  - Assess, plan, implement and evaluate the nursing care given to the patient in labor  - Advocate the philosophy that each childbirth experience is a unique experience and support the family's chosen level of involvement and control during the labor process   - Actively participate in both the patient's and family's teaching of the birth process  - Consider cultural, Congregational and age-specific factors and plan care for the patient in labor  Outcome: Progressing

## 2019-12-06 NOTE — L&D DELIVERY NOTE
Vaginal Delivery Summary - OB/GYN   Go Fagan 44 y o  female MRN: [id number]  Unit/Bed#: -01 Encounter: [id number]          Predelivery Diagnosis:  1  Pregnancy at 39w5d  2  AMA  3  Obesity     Postdelivery Diagnosis:  1  Same as above  2  Delivery of term     Procedure: Spontaneous Vaginal Delivery, repair of second degree laceration    Attending: Brandee Barron    Assistant: Campbell    Anesthesia: Epidural    QBL: 248cc  Admission H 7  Admission platelets: 150    Complications: none apparent    Specimens: cord blood, arterial and venous cord blood gasses, placenta to storage    Findings:   1  Viable female at 200, with APGARS of 9 and 9 at 1 and 5 minutes respectively,  2  Spontaneous delivery of intact placenta at 1626  3  2 degree laceration repaired with 3-0 Vicryl rapide  4  Blood gases:    Umbilical Cord Venous Blood Gas:  Results from last 7 days   Lab Units 19  1627   PH COV  7 251   PCO2 COV mm HG 45 2*   HCO3 COV mmol/L 19 4   BASE EXC COV mmol/L -7 7*   O2 CT CD VB mL/dL 11 9   O2 HGB, VENOUS CORD % 50 1     Umbilical Cord Arterial Blood Gas:  Results from last 7 days   Lab Units 19  1627   PH COA  7 150*   PCO2 COA  52 5   PO2 COA mm HG 27 4*   HCO3 COA mmol/L 17 9   BASE EXC COA mmol/L -11 3*   O2 CONTENT CORD ART ml/dl 11 6   O2 HGB, ARTERIAL CORD % 51 0       Disposition:  Patient tolerated the procedure well and was recovering in labor and delivery room     Brief history and labor course:  Ms Go Fagan is a 44 y o   at 39wk5d  She presented to labor and delivery with PROM  She was found to be grossly ruptured on exam, but did not progress on recheck  She was started on pitocin for induction and received an epidural for analgesia  She progressed well to fully dilated at 1535  Description of procedure    Warm compresses were applied during pushing and perineal massage was performed   After pushing for 30 minutes, at 1623 patient delivered a viable female , wt pending, apgars of 9 (1 min) and 9 (5 min)  The fetal vertex delivered spontaneously  Baby was checked for nuchal  None present  The anterior shoulder delivered atraumatically with maternal expulsive forces and the assistance of gentle downward traction  The posterior shoulder delivered with maternal expulsive forces and the assistance of gentle upward traction  The remainder of the fetus delivered spontaneously  Upon delivery, the infant was placed on the mothers abdomen and the cord was clamped and cut  Delayed cord clamping was performed  The infant was noted to cry spontaneously and had all signs of life  There was no evidence for injury  Awaiting nurse resuscitators evaluated the   Arterial and venous cord blood gases and cord blood was collected for analysis  These were promptly sent to the lab  In the immediate post-partum, 30 units of IV pitocin was administered, and the uterus was noted to contract down well with massage and pitocin  The placenta delivered spontaneously at 1626 and was noted to have a centrally inserted 3 vessel cord  The vagina, cervix, perineum, and rectum were inspected and there was noted to be a second degree laceration requiring repair  The apex of the vaginal laceration was identified and a suture of 3-0 Vicryl Rapide was placed 1cm above the apex  The vaginal mucosa and underlying rectovaginal fascia were closed using a running locked suture to the hymenal ring  The suture was then brought underneath the hymenal ring  A stitch was then placed through the bulbocavernosus muscle  Continuing with the same suture, the transverse perineal muscles were re-approximated  The suture was brought to the posterior apex of the skin laceration and then the skin was re-approximated in a subcuticular fashion to the hymenal ring  Hemostasis was achieved  At the conclusion of the procedure, all needle, sponge, and instrument counts were noted to be correct   Viry Carmela showed no retained sponges  Patient tolerated the procedure well and was allowed to recover in labor and delivery room with family and  before being transferred to the post-partum floor  Dr Maren Hall was present and participated in all key portions of the case          Jd Brumfield MD  2019  4:53 PM

## 2019-12-07 PROCEDURE — 99024 POSTOP FOLLOW-UP VISIT: CPT | Performed by: OBSTETRICS & GYNECOLOGY

## 2019-12-07 RX ORDER — BISACODYL 10 MG
10 SUPPOSITORY, RECTAL RECTAL DAILY PRN
Status: DISCONTINUED | OUTPATIENT
Start: 2019-12-07 | End: 2019-12-08 | Stop reason: HOSPADM

## 2019-12-07 RX ADMIN — FAMOTIDINE 20 MG: 20 TABLET ORAL at 08:17

## 2019-12-07 RX ADMIN — IBUPROFEN 600 MG: 600 TABLET ORAL at 04:41

## 2019-12-07 RX ADMIN — DOCUSATE SODIUM 100 MG: 100 CAPSULE, LIQUID FILLED ORAL at 08:17

## 2019-12-07 RX ADMIN — FAMOTIDINE 20 MG: 20 TABLET ORAL at 18:14

## 2019-12-07 RX ADMIN — IBUPROFEN 600 MG: 600 TABLET ORAL at 17:43

## 2019-12-07 RX ADMIN — DOCUSATE SODIUM 100 MG: 100 CAPSULE, LIQUID FILLED ORAL at 17:43

## 2019-12-07 NOTE — LACTATION NOTE
This note was copied from a baby's chart  Mom concerned about infant getting enough milk  Observed infant at breast in cradle hold Good positioning and latch noted and reviewed  Reviewed signs of milk transfer and reassurances given

## 2019-12-07 NOTE — PROGRESS NOTES
Progress Note - OB/GYN   Chace Adams 44 y o  female MRN: [id number]  Unit/Bed#: -01 Encounter: [id number]    Assessment:  Post partum Day #1 s/p , stable, baby in nursery    Plan:  1) Continue routine post partum care   Encourage ambulation   Encourage breastfeeding        Subjective/Objective   Chief Complaint:     Post delivery  Patient is doing well  Lochia WNL  Pain well controlled  Subjective:     Pain: yes, cramping, improved with meds  Tolerating PO: yes  Voiding: yes  Flatus: yes  BM: no  Ambulating: yes  Breastfeeding:  yes  Chest pain: no  Shortness of breath: no  Leg pain: no  Lochia: minimal    Objective:     Vitals: BP (!) 94/48 (BP Location: Right arm)   Pulse 74   Temp 98 2 °F (36 8 °C) (Oral)   Resp 18   Ht 5' 6" (1 676 m)   Wt 88 9 kg (196 lb)   LMP 2019 (Exact Date)   Breastfeeding? Yes   BMI 31 64 kg/m²       Intake/Output Summary (Last 24 hours) at 2019 0804  Last data filed at 2019 2049  Gross per 24 hour   Intake --   Output 1448 ml   Net -1448 ml       Lab Results   Component Value Date    WBC 15 14 (H) 2019    HGB 11 7 2019    HCT 36 0 2019    MCV 90 2019     2019       Physical Exam:     Gen: AAOx3, NAD  CV: RRR  Lungs: CTA b/l  Abd: Soft, non-tender, non-distended, no rebound or guarding  Uterine fundus firm and non-tender, @ the umbilicus     Ext: Non tender    Ta De Leon MD  2019  8:04 AM

## 2019-12-07 NOTE — ANESTHESIA POSTPROCEDURE EVALUATION
Post-Op Assessment Note    CV Status:  Stable    Pain management: adequate     Mental Status:  Alert and awake   Hydration Status:  Euvolemic   PONV Controlled:  Controlled   Airway Patency:  Patent   Post Op Vitals Reviewed: Yes      Staff: Anesthesiologist   Comments: patient sitting up with family pleasant and conversant    Post-op block assessment: catheter intact and no complications

## 2019-12-07 NOTE — LACTATION NOTE
This note was copied from a baby's chart  Mom states infant is feeding well so far  Observed infant at breast in cradle hold  Good positioning and latch with minor adjustments  Reviewed expected  infant feeding patterns in the first few days and encouraged feeding on cue  Given admission breastfeeding pkat and same reviewed  Encouraged to call for additional assistance  as needed

## 2019-12-07 NOTE — PLAN OF CARE
Problem: PAIN - ADULT  Goal: Verbalizes/displays adequate comfort level or baseline comfort level  Description  Interventions:  - Encourage patient to monitor pain and request assistance  - Assess pain using appropriate pain scale  - Administer analgesics based on type and severity of pain and evaluate response  - Implement non-pharmacological measures as appropriate and evaluate response  - Consider cultural and social influences on pain and pain management  - Notify physician/advanced practitioner if interventions unsuccessful or patient reports new pain  Outcome: Progressing     Problem: INFECTION - ADULT  Goal: Absence or prevention of progression during hospitalization  Description  INTERVENTIONS:  - Assess and monitor for signs and symptoms of infection  - Monitor lab/diagnostic results  - Monitor all insertion sites, i e  indwelling lines, tubes, and drains  - Monitor endotracheal if appropriate and nasal secretions for changes in amount and color  - [place] appropriate cooling/warming therapies per order  - Administer medications as ordered  - Instruct and encourage patient and family to use good hand hygiene technique  - Identify and instruct in appropriate isolation precautions for identified infection/condition  Outcome: Progressing  Goal: Absence of fever/infection during neutropenic period  Description  INTERVENTIONS:  - Monitor WBC    Outcome: Progressing     Problem: SAFETY ADULT  Goal: Patient will remain free of falls  Description  INTERVENTIONS:  - Assess patient frequently for physical needs  -  Identify cognitive and physical deficits and behaviors that affect risk of falls    -  [place] fall precautions as indicated by assessment   - Educate patient/family on patient safety including physical limitations  - Instruct patient to call for assistance with activity based on assessment  - Modify environment to reduce risk of injury  - Consider OT/PT consult to assist with strengthening/mobility  Outcome: Progressing  Goal: Maintain or return to baseline ADL function  Description  INTERVENTIONS:  -  Assess patient's ability to carry out ADLs; assess patient's baseline for ADL function and identify physical deficits which impact ability to perform ADLs (bathing, care of mouth/teeth, toileting, grooming, dressing, etc )  - Assess/evaluate cause of self-care deficits   - Assess range of motion  - Assess patient's mobility; develop plan if impaired  - Assess patient's need for assistive devices and provide as appropriate  - Encourage maximum independence but intervene and supervise when necessary  - Involve family in performance of ADLs  - Assess for home care needs following discharge   - Consider OT consult to assist with ADL evaluation and planning for discharge  - Provide patient education as appropriate  Outcome: Progressing  Goal: Maintain or return mobility status to optimal level  Description  INTERVENTIONS:  - Assess patient's baseline mobility status (ambulation, transfers, stairs, etc )    - Identify cognitive and physical deficits and behaviors that affect mobility  - Identify mobility aids required to assist with transfers and/or ambulation (gait belt, sit-to-stand, lift, walker, cane, etc )  - [place] fall precautions as indicated by assessment  - Record patient progress and toleration of activity level on Mobility SBAR; progress patient to next Phase/Stage  - Instruct patient to call for assistance with activity based on assessment  - Consider rehabilitation consult to assist with strengthening/weightbearing, etc   Outcome: Progressing     Problem: Knowledge Deficit  Goal: Patient/family/caregiver demonstrates understanding of disease process, treatment plan, medications, and discharge instructions  Description  Complete learning assessment and assess knowledge base    Interventions:  - Provide teaching at level of understanding  - Provide teaching via preferred learning methods  Outcome: Progressing     Problem: DISCHARGE PLANNING  Goal: Discharge to home or other facility with appropriate resources  Description  INTERVENTIONS:  - Identify barriers to discharge w/patient and caregiver  - Arrange for needed discharge resources and transportation as appropriate  - Identify discharge learning needs (meds, wound care, etc )  - Arrange for interpretive services to assist at discharge as needed  - Refer to Case Management Department for coordinating discharge planning if the patient needs post-hospital services based on physician/advanced practitioner order or complex needs related to functional status, cognitive ability, or social support system  Outcome: Progressing     Problem: POSTPARTUM  Goal: Experiences normal postpartum course  Description  INTERVENTIONS:  - Monitor maternal vital signs  - Assess uterine involution and lochia  Outcome: Progressing  Goal: Appropriate maternal -  bonding  Description  INTERVENTIONS:  - Identify family support  - Assess for appropriate maternal/infant bonding   -Encourage maternal/infant bonding opportunities  - Referral to  or  as needed  Outcome: Progressing  Goal: Establishment of infant feeding pattern  Description  INTERVENTIONS:  - Assess breast/bottle feeding  - Refer to lactation as needed  Outcome: Progressing  Goal: Incision(s), wounds(s) or drain site(s) healing without S/S of infection  Description  INTERVENTIONS  - Assess and document risk factors for skin impairment   - Assess and document dressing, incision, wound bed, drain sites and surrounding tissue  - Consider nutrition services referral as needed  - Oral mucous membranes remain intact  - Provide patient/ family education  Outcome: Progressing     Problem: BIRTH - VAGINAL/ SECTION  Goal: Fetal and maternal status remain reassuring during the birth process  Description  INTERVENTIONS:  - Monitor vital signs  - Monitor fetal heart rate  - Monitor uterine activity  - Monitor labor progression (vaginal delivery)  - DVT prophylaxis  - Antibiotic prophylaxis  Outcome: Progressing  Goal: Emotionally satisfying birthing experience for mother/fetus  Description  Interventions:  - Assess, plan, implement and evaluate the nursing care given to the patient in labor  - [place] the philosophy that each childbirth experience is a unique experience and support the family's chosen level of involvement and control during the labor process   - Actively participate in both the patient's and family's teaching of the birth process  - Consider cultural, Baptism and age-specific factors and plan care for the patient in labor  Outcome: Progressing     Problem: Potential for Falls  Goal: Patient will remain free of falls  Description  INTERVENTIONS:  - Assess patient frequently for physical needs  -  Identify cognitive and physical deficits and behaviors that affect risk of falls    -  [place] fall precautions as indicated by assessment   - Educate patient/family on patient safety including physical limitations  - Instruct patient to call for assistance with activity based on assessment  - Modify environment to reduce risk of injury  - Consider OT/PT consult to assist with strengthening/mobility  Outcome: Progressing

## 2019-12-08 VITALS
OXYGEN SATURATION: 99 % | TEMPERATURE: 99.2 F | BODY MASS INDEX: 31.5 KG/M2 | DIASTOLIC BLOOD PRESSURE: 58 MMHG | RESPIRATION RATE: 20 BRPM | SYSTOLIC BLOOD PRESSURE: 112 MMHG | HEART RATE: 76 BPM | WEIGHT: 196 LBS | HEIGHT: 66 IN

## 2019-12-08 PROCEDURE — 99024 POSTOP FOLLOW-UP VISIT: CPT | Performed by: OBSTETRICS & GYNECOLOGY

## 2019-12-08 RX ORDER — IBUPROFEN 600 MG/1
600 TABLET ORAL EVERY 6 HOURS PRN
Qty: 30 TABLET | Refills: 0
Start: 2019-12-08 | End: 2020-01-21 | Stop reason: ALTCHOICE

## 2019-12-08 RX ORDER — ACETAMINOPHEN 325 MG/1
650 TABLET ORAL EVERY 4 HOURS PRN
Qty: 30 TABLET | Refills: 0
Start: 2019-12-08 | End: 2020-01-21 | Stop reason: ALTCHOICE

## 2019-12-08 RX ORDER — DOCUSATE SODIUM 100 MG/1
100 CAPSULE, LIQUID FILLED ORAL 2 TIMES DAILY PRN
Qty: 10 CAPSULE | Refills: 0
Start: 2019-12-08 | End: 2020-05-01 | Stop reason: ALTCHOICE

## 2019-12-08 RX ADMIN — FAMOTIDINE 20 MG: 20 TABLET ORAL at 09:41

## 2019-12-08 RX ADMIN — DOCUSATE SODIUM 100 MG: 100 CAPSULE, LIQUID FILLED ORAL at 09:41

## 2019-12-08 NOTE — PLAN OF CARE
Problem: PAIN - ADULT  Goal: Verbalizes/displays adequate comfort level or baseline comfort level  Description  Interventions:  - Encourage patient to monitor pain and request assistance  - Assess pain using appropriate pain scale  - Administer analgesics based on type and severity of pain and evaluate response  - Implement non-pharmacological measures as appropriate and evaluate response  - Consider cultural and social influences on pain and pain management  - Notify physician/advanced practitioner if interventions unsuccessful or patient reports new pain  Outcome: Progressing     Problem: INFECTION - ADULT  Goal: Absence or prevention of progression during hospitalization  Description  INTERVENTIONS:  - Assess and monitor for signs and symptoms of infection  - Monitor lab/diagnostic results  - Monitor all insertion sites, i e  indwelling lines, tubes, and drains  - Monitor endotracheal if appropriate and nasal secretions for changes in amount and color  - [place] appropriate cooling/warming therapies per order  - Administer medications as ordered  - Instruct and encourage patient and family to use good hand hygiene technique  - Identify and instruct in appropriate isolation precautions for identified infection/condition  Outcome: Progressing  Goal: Absence of fever/infection during neutropenic period  Description  INTERVENTIONS:  - Monitor WBC    Outcome: Progressing     Problem: SAFETY ADULT  Goal: Patient will remain free of falls  Description  INTERVENTIONS:  - Assess patient frequently for physical needs  -  Identify cognitive and physical deficits and behaviors that affect risk of falls    -  [place] fall precautions as indicated by assessment   - Educate patient/family on patient safety including physical limitations  - Instruct patient to call for assistance with activity based on assessment  - Modify environment to reduce risk of injury  - Consider OT/PT consult to assist with strengthening/mobility  Outcome: Progressing  Goal: Maintain or return to baseline ADL function  Description  INTERVENTIONS:  -  Assess patient's ability to carry out ADLs; assess patient's baseline for ADL function and identify physical deficits which impact ability to perform ADLs (bathing, care of mouth/teeth, toileting, grooming, dressing, etc )  - Assess/evaluate cause of self-care deficits   - Assess range of motion  - Assess patient's mobility; develop plan if impaired  - Assess patient's need for assistive devices and provide as appropriate  - Encourage maximum independence but intervene and supervise when necessary  - Involve family in performance of ADLs  - Assess for home care needs following discharge   - Consider OT consult to assist with ADL evaluation and planning for discharge  - Provide patient education as appropriate  Outcome: Progressing  Goal: Maintain or return mobility status to optimal level  Description  INTERVENTIONS:  - Assess patient's baseline mobility status (ambulation, transfers, stairs, etc )    - Identify cognitive and physical deficits and behaviors that affect mobility  - Identify mobility aids required to assist with transfers and/or ambulation (gait belt, sit-to-stand, lift, walker, cane, etc )  - [place] fall precautions as indicated by assessment  - Record patient progress and toleration of activity level on Mobility SBAR; progress patient to next Phase/Stage  - Instruct patient to call for assistance with activity based on assessment  - Consider rehabilitation consult to assist with strengthening/weightbearing, etc   Outcome: Progressing     Problem: Knowledge Deficit  Goal: Patient/family/caregiver demonstrates understanding of disease process, treatment plan, medications, and discharge instructions  Description  Complete learning assessment and assess knowledge base    Interventions:  - Provide teaching at level of understanding  - Provide teaching via preferred learning methods  Outcome: Progressing     Problem: DISCHARGE PLANNING  Goal: Discharge to home or other facility with appropriate resources  Description  INTERVENTIONS:  - Identify barriers to discharge w/patient and caregiver  - Arrange for needed discharge resources and transportation as appropriate  - Identify discharge learning needs (meds, wound care, etc )  - Arrange for interpretive services to assist at discharge as needed  - Refer to Case Management Department for coordinating discharge planning if the patient needs post-hospital services based on physician/advanced practitioner order or complex needs related to functional status, cognitive ability, or social support system  Outcome: Progressing     Problem: POSTPARTUM  Goal: Experiences normal postpartum course  Description  INTERVENTIONS:  - Monitor maternal vital signs  - Assess uterine involution and lochia  Outcome: Progressing  Goal: Appropriate maternal -  bonding  Description  INTERVENTIONS:  - Identify family support  - Assess for appropriate maternal/infant bonding   -Encourage maternal/infant bonding opportunities  - Referral to  or  as needed  Outcome: Progressing  Goal: Establishment of infant feeding pattern  Description  INTERVENTIONS:  - Assess breast/bottle feeding  - Refer to lactation as needed  Outcome: Progressing  Goal: Incision(s), wounds(s) or drain site(s) healing without S/S of infection  Description  INTERVENTIONS  - Assess and document risk factors for skin impairment   - Assess and document dressing, incision, wound bed, drain sites and surrounding tissue  - Consider nutrition services referral as needed  - Oral mucous membranes remain intact  - Provide patient/ family education  Outcome: Progressing     Problem: Potential for Falls  Goal: Patient will remain free of falls  Description  INTERVENTIONS:  - Assess patient frequently for physical needs  -  Identify cognitive and physical deficits and behaviors that affect risk of falls    -  [place] fall precautions as indicated by assessment   - Educate patient/family on patient safety including physical limitations  - Instruct patient to call for assistance with activity based on assessment  - Modify environment to reduce risk of injury  - Consider OT/PT consult to assist with strengthening/mobility  Outcome: Progressing

## 2019-12-08 NOTE — PROGRESS NOTES
Progress Note - OB/GYN   Luisa Muñiz 44 y o  female MRN: [id number]  Unit/Bed#:  321-01 Encounter: [id number]    Assessment:  Post partum Day #2 s/p , stable, baby in nursery    Plan:  1) Continue routine post partum care   Encourage ambulation   Encourage breastfeeding   Anticipate discharge today    Subjective/Objective   Chief Complaint:     Post delivery  Patient is doing well  Lochia WNL  Pain well controlled  Subjective:     Pain: yes, cramping, improved with meds  Tolerating PO: yes  Voiding: yes  Flatus: yes  BM: yes  Ambulating: yes  Breastfeeding:  yes  Chest pain: no  Shortness of breath: no  Leg pain: no  Lochia: minimal    Objective:     Vitals: /58   Pulse 76   Temp 99 2 °F (37 3 °C) (Oral)   Resp 20   Ht 5' 6" (1 676 m)   Wt 88 9 kg (196 lb)   LMP 2019 (Exact Date)   SpO2 99%   Breastfeeding? Yes   BMI 31 64 kg/m²     No intake or output data in the 24 hours ending 19 0857    Lab Results   Component Value Date    WBC 15 14 (H) 2019    HGB 11 7 2019    HCT 36 0 2019    MCV 90 2019     2019       Physical Exam:     Gen: AAOx3, NAD  CV: RRR  Lungs: CTA b/l  Abd: Soft, non-tender, non-distended, no rebound or guarding  Uterine fundus firm and non-tender, @ the umbilicus     Ext: Non tender    Moni Leal MD  2019  8:57 AM

## 2019-12-08 NOTE — PLAN OF CARE
Problem: PAIN - ADULT  Goal: Verbalizes/displays adequate comfort level or baseline comfort level  Description  Interventions:  - Encourage patient to monitor pain and request assistance  - Assess pain using appropriate pain scale  - Administer analgesics based on type and severity of pain and evaluate response  - Implement non-pharmacological measures as appropriate and evaluate response  - Consider cultural and social influences on pain and pain management  - Notify physician/advanced practitioner if interventions unsuccessful or patient reports new pain  Outcome: Progressing     Problem: INFECTION - ADULT  Goal: Absence or prevention of progression during hospitalization  Description  INTERVENTIONS:  - Assess and monitor for signs and symptoms of infection  - Monitor lab/diagnostic results  - Monitor all insertion sites, i e  indwelling lines, tubes, and drains  - Monitor endotracheal if appropriate and nasal secretions for changes in amount and color  - [place] appropriate cooling/warming therapies per order  - Administer medications as ordered  - Instruct and encourage patient and family to use good hand hygiene technique  - Identify and instruct in appropriate isolation precautions for identified infection/condition  Outcome: Progressing  Goal: Absence of fever/infection during neutropenic period  Description  INTERVENTIONS:  - Monitor WBC    Outcome: Progressing     Problem: SAFETY ADULT  Goal: Patient will remain free of falls  Description  INTERVENTIONS:  - Assess patient frequently for physical needs  -  Identify cognitive and physical deficits and behaviors that affect risk of falls    -  [place] fall precautions as indicated by assessment   - Educate patient/family on patient safety including physical limitations  - Instruct patient to call for assistance with activity based on assessment  - Modify environment to reduce risk of injury  - Consider OT/PT consult to assist with strengthening/mobility  Outcome: Progressing  Goal: Maintain or return to baseline ADL function  Description  INTERVENTIONS:  -  Assess patient's ability to carry out ADLs; assess patient's baseline for ADL function and identify physical deficits which impact ability to perform ADLs (bathing, care of mouth/teeth, toileting, grooming, dressing, etc )  - Assess/evaluate cause of self-care deficits   - Assess range of motion  - Assess patient's mobility; develop plan if impaired  - Assess patient's need for assistive devices and provide as appropriate  - Encourage maximum independence but intervene and supervise when necessary  - Involve family in performance of ADLs  - Assess for home care needs following discharge   - Consider OT consult to assist with ADL evaluation and planning for discharge  - Provide patient education as appropriate  Outcome: Progressing  Goal: Maintain or return mobility status to optimal level  Description  INTERVENTIONS:  - Assess patient's baseline mobility status (ambulation, transfers, stairs, etc )    - Identify cognitive and physical deficits and behaviors that affect mobility  - Identify mobility aids required to assist with transfers and/or ambulation (gait belt, sit-to-stand, lift, walker, cane, etc )  - [place] fall precautions as indicated by assessment  - Record patient progress and toleration of activity level on Mobility SBAR; progress patient to next Phase/Stage  - Instruct patient to call for assistance with activity based on assessment  - Consider rehabilitation consult to assist with strengthening/weightbearing, etc   Outcome: Progressing     Problem: Knowledge Deficit  Goal: Patient/family/caregiver demonstrates understanding of disease process, treatment plan, medications, and discharge instructions  Description  Complete learning assessment and assess knowledge base    Interventions:  - Provide teaching at level of understanding  - Provide teaching via preferred learning methods  Outcome: Progressing     Problem: DISCHARGE PLANNING  Goal: Discharge to home or other facility with appropriate resources  Description  INTERVENTIONS:  - Identify barriers to discharge w/patient and caregiver  - Arrange for needed discharge resources and transportation as appropriate  - Identify discharge learning needs (meds, wound care, etc )  - Arrange for interpretive services to assist at discharge as needed  - Refer to Case Management Department for coordinating discharge planning if the patient needs post-hospital services based on physician/advanced practitioner order or complex needs related to functional status, cognitive ability, or social support system  Outcome: Progressing     Problem: POSTPARTUM  Goal: Experiences normal postpartum course  Description  INTERVENTIONS:  - Monitor maternal vital signs  - Assess uterine involution and lochia  Outcome: Progressing  Goal: Appropriate maternal -  bonding  Description  INTERVENTIONS:  - Identify family support  - Assess for appropriate maternal/infant bonding   -Encourage maternal/infant bonding opportunities  - Referral to  or  as needed  Outcome: Progressing  Goal: Establishment of infant feeding pattern  Description  INTERVENTIONS:  - Assess breast/bottle feeding  - Refer to lactation as needed  Outcome: Progressing  Goal: Incision(s), wounds(s) or drain site(s) healing without S/S of infection  Description  INTERVENTIONS  - Assess and document risk factors for skin impairment   - Assess and document dressing, incision, wound bed, drain sites and surrounding tissue  - Consider nutrition services referral as needed  - Oral mucous membranes remain intact  - Provide patient/ family education  Outcome: Progressing     Problem: Potential for Falls  Goal: Patient will remain free of falls  Description  INTERVENTIONS:  - Assess patient frequently for physical needs  -  Identify cognitive and physical deficits and behaviors that affect risk of falls    -  [place] fall precautions as indicated by assessment   - Educate patient/family on patient safety including physical limitations  - Instruct patient to call for assistance with activity based on assessment  - Modify environment to reduce risk of injury  - Consider OT/PT consult to assist with strengthening/mobility  Outcome: Progressing

## 2019-12-08 NOTE — PLAN OF CARE
Problem: PAIN - ADULT  Goal: Verbalizes/displays adequate comfort level or baseline comfort level  Description  Interventions:  - Encourage patient to monitor pain and request assistance  - Assess pain using appropriate pain scale  - Administer analgesics based on type and severity of pain and evaluate response  - Implement non-pharmacological measures as appropriate and evaluate response  - Consider cultural and social influences on pain and pain management  - Notify physician/advanced practitioner if interventions unsuccessful or patient reports new pain  12/8/2019 1425 by Jim Hairston RN  Outcome: Adequate for Discharge  12/8/2019 1159 by Jim Hairston RN  Outcome: Progressing     Problem: INFECTION - ADULT  Goal: Absence or prevention of progression during hospitalization  Description  INTERVENTIONS:  - Assess and monitor for signs and symptoms of infection  - Monitor lab/diagnostic results  - Monitor all insertion sites, i e  indwelling lines, tubes, and drains  - Monitor endotracheal if appropriate and nasal secretions for changes in amount and color  - Bastian appropriate cooling/warming therapies per order  - Administer medications as ordered  - Instruct and encourage patient and family to use good hand hygiene technique  - Identify and instruct in appropriate isolation precautions for identified infection/condition  12/8/2019 1425 by Jim Hairston RN  Outcome: Adequate for Discharge  12/8/2019 1159 by Jim Hairston RN  Outcome: Progressing  Goal: Absence of fever/infection during neutropenic period  Description  INTERVENTIONS:  - Monitor WBC    12/8/2019 1425 by Jim Hairston RN  Outcome: Adequate for Discharge  12/8/2019 1159 by Jim Hairston RN  Outcome: Progressing     Problem: SAFETY ADULT  Goal: Patient will remain free of falls  Description  INTERVENTIONS:  - Assess patient frequently for physical needs  -  Identify cognitive and physical deficits and behaviors that affect risk of falls    - [place] fall precautions as indicated by assessment   - Educate patient/family on patient safety including physical limitations  - Instruct patient to call for assistance with activity based on assessment  - Modify environment to reduce risk of injury  - Consider OT/PT consult to assist with strengthening/mobility  12/8/2019 1425 by Jim Hairston RN  Outcome: Adequate for Discharge  12/8/2019 1159 by Jim Hairston RN  Outcome: Progressing  Goal: Maintain or return to baseline ADL function  Description  INTERVENTIONS:  -  Assess patient's ability to carry out ADLs; assess patient's baseline for ADL function and identify physical deficits which impact ability to perform ADLs (bathing, care of mouth/teeth, toileting, grooming, dressing, etc )  - Assess/evaluate cause of self-care deficits   - Assess range of motion  - Assess patient's mobility; develop plan if impaired  - Assess patient's need for assistive devices and provide as appropriate  - Encourage maximum independence but intervene and supervise when necessary  - Involve family in performance of ADLs  - Assess for home care needs following discharge   - Consider OT consult to assist with ADL evaluation and planning for discharge  - Provide patient education as appropriate  12/8/2019 1425 by Jim Hairston RN  Outcome: Adequate for Discharge  12/8/2019 1159 by Jim Hairston RN  Outcome: Progressing  Goal: Maintain or return mobility status to optimal level  Description  INTERVENTIONS:  - Assess patient's baseline mobility status (ambulation, transfers, stairs, etc )    - Identify cognitive and physical deficits and behaviors that affect mobility  - Identify mobility aids required to assist with transfers and/or ambulation (gait belt, sit-to-stand, lift, walker, cane, etc )  - [place] fall precautions as indicated by assessment  - Record patient progress and toleration of activity level on Mobility SBAR; progress patient to next Phase/Stage  - Instruct patient to call for assistance with activity based on assessment  - Consider rehabilitation consult to assist with strengthening/weightbearing, etc   2019 1425 by Joon Chong RN  Outcome: Adequate for Discharge  2019 1159 by Joon Chong RN  Outcome: Progressing     Problem: Knowledge Deficit  Goal: Patient/family/caregiver demonstrates understanding of disease process, treatment plan, medications, and discharge instructions  Description  Complete learning assessment and assess knowledge base    Interventions:  - Provide teaching at level of understanding  - Provide teaching via preferred learning methods  2019 1425 by Joon Chong RN  Outcome: Adequate for Discharge  2019 1159 by Joon Chong RN  Outcome: Progressing     Problem: DISCHARGE PLANNING  Goal: Discharge to home or other facility with appropriate resources  Description  INTERVENTIONS:  - Identify barriers to discharge w/patient and caregiver  - Arrange for needed discharge resources and transportation as appropriate  - Identify discharge learning needs (meds, wound care, etc )  - Arrange for interpretive services to assist at discharge as needed  - Refer to Case Management Department for coordinating discharge planning if the patient needs post-hospital services based on physician/advanced practitioner order or complex needs related to functional status, cognitive ability, or social support system  2019 1425 by Joon Chong RN  Outcome: Adequate for Discharge  2019 1159 by Joon Chong RN  Outcome: Progressing     Problem: POSTPARTUM  Goal: Experiences normal postpartum course  Description  INTERVENTIONS:  - Monitor maternal vital signs  - Assess uterine involution and lochia  2019 1425 by Joon Chong RN  Outcome: Adequate for Discharge  2019 1159 by Joon Chong RN  Outcome: Progressing  Goal: Appropriate maternal -  bonding  Description  INTERVENTIONS:  - Identify family support  - Assess for appropriate maternal/infant bonding   -Encourage maternal/infant bonding opportunities  - Referral to  or  as needed  12/8/2019 1425 by Roseanne Duval RN  Outcome: Adequate for Discharge  12/8/2019 1159 by Roseanne Duval RN  Outcome: Progressing  Goal: Establishment of infant feeding pattern  Description  INTERVENTIONS:  - Assess breast/bottle feeding  - Refer to lactation as needed  12/8/2019 1425 by Roseanne Duval RN  Outcome: Adequate for Discharge  12/8/2019 1159 by Roseanne Duval RN  Outcome: Progressing  Goal: Incision(s), wounds(s) or drain site(s) healing without S/S of infection  Description  INTERVENTIONS  - Assess and document risk factors for skin impairment   - Assess and document dressing, incision, wound bed, drain sites and surrounding tissue  - Consider nutrition services referral as needed  - Oral mucous membranes remain intact  - Provide patient/ family education  12/8/2019 1425 by Roseanne Duval RN  Outcome: Adequate for Discharge  12/8/2019 1159 by Roseanne Duval RN  Outcome: Progressing     Problem: Potential for Falls  Goal: Patient will remain free of falls  Description  INTERVENTIONS:  - Assess patient frequently for physical needs  -  Identify cognitive and physical deficits and behaviors that affect risk of falls    -  [place] fall precautions as indicated by assessment   - Educate patient/family on patient safety including physical limitations  - Instruct patient to call for assistance with activity based on assessment  - Modify environment to reduce risk of injury  - Consider OT/PT consult to assist with strengthening/mobility  12/8/2019 1425 by Roseanne Duval RN  Outcome: Adequate for Discharge  12/8/2019 1159 by Roseanne Duval RN  Outcome: Progressing

## 2019-12-09 NOTE — UTILIZATION REVIEW
Notification of Maternity/Delivery & [place] Birth Information for Admission - [id number]    Notification of Maternity/Delivery for Admission to our facility [address]  Be advised that this patient was admitted to our facility under Inpatient Status  Contact Hudsonlinda Gallegos at [phone] for additional admission information  Jenn[facility] PARENT/CHILD HEALTH  DEPT DEDICATED Daved Cover [phone]  Mother &  Information   Patient Name: Jean Pierre Wheatley   YOB: 1980   Delivering clinician: Mila Cuevas   OB History        2    Para   1    Term   1       0    AB   1    Living   1       SAB   0    TAB   0    Ectopic   0    Multiple   0    Live Births   1                Name & MRN:   Information for the patient's :  Regla Matthews [[id number]]     [place] Delivery Information:  Sex: female  Delivered 2019 4:23 PM by Vaginal, Spontaneous; Gestational Age: 38w11d     Measurements:  Weight: 7 lb 2 oz (3232 g); Height: 19 5"    APGAR 1 minute 5 minutes 10 minutes   Totals: 9 9       Birth Information: 44 y o  female MRN: [id number] Unit/Bed#: -01 Estimated Date of Delivery: 19  Birthweight: No birth weight on file   Gestational Age: <None> Delivery Type: Vaginal, Spontaneous          APGARS  One minute Five minutes Ten minutes   Totals:                 [address]   [address]:   [address] [facility]  Tax ID: [id number]  NPI: [id number] Attending Provider/NPI: Mila Cuevas Md [[id number]]   Place of Service Code: 24     Place of Service Name:  Inpatient Hospital   Start Date: 19 IPADMITTIME@     Discharge Date & Time: 2019  2:17 PM    Type of Admission: Inpatient Status Discharge Disposition (if discharged): Home/Self Care   Patient Diagnoses:   39 weeks gestation of pregnancy [Z3A 39]  The primary encounter diagnosis was  (spontaneous vaginal delivery)  A diagnosis of 39 weeks gestation of pregnancy was also pertinent to this visit  1   (spontaneous vaginal delivery)    2  39 weeks gestation of pregnancy       Orders: Admission Orders (From admission, onward)     Ordered        19 0410  Inpatient Admission  Once                    Assigned Utilization Review Contact: Gay Mays  Utilization   Network Utilization Review Department  Phone: [phone]; Fax [phone]  Email: [email or web address]

## 2019-12-10 ENCOUNTER — TELEPHONE (OUTPATIENT)
Dept: POSTPARTUM | Facility: CLINIC | Age: 39
End: 2019-12-10

## 2019-12-10 NOTE — TELEPHONE ENCOUNTER
Samira Rey is calling to report uncomfortable breast engorgement  Her milk volume began to increase yesterday  She is now 3 days postpartum  Her baby has nursed 3 times in the last 24 hours  Baby has been unable to latch to the left breast since yesterday  Has mom experienced fever higher than 100 5 degrees Fahrenheit? No She needs to be seen by her primary care provider within the next 24 hours  Are any of the following true?  Are her nipples very sore?  Is the baby having trouble achieving a deep latch?  Is the uncomfortable breast fullness persisting past day 7 postpartum? Y  Somewhat sore  Yes, particularly on the left breast  No    x She needs to be seen by a lactation consultant or a knowledgeable physician  x In the meantime, if the baby cannot nurse well or nipples are too sore to breastfeed, recommend that she manually express her breasts with or without a pump every 2-3 hours and feed the baby until they are seen  If above answers are No, advise the followin) Inform mother that some degree of breast engorgement is normal between days 2 and 6 postpartum  2) Lie down on her back, with breasts elevated, and apply cold packs or cold compresses (such as cold towels) around the entire breasts for 10-20 minutes at a time, between feedings  Massage the breasts intermittently during this time  3) Advise trying reverse pressure softening  To learn this, mom can visit the [facility] website at [email or web address] rae le/  4) Massage the breasts before nursing to improve milk flow  Visit [email or web address]   5) Use moist heat for a few minutes before nursing to encourage a letdown (shower or warm moist towel)  6) Nurse at least 8 or more times per day in order to keep breasts well-drained  7) Use acetaminophen or ibuprofen as needed for discomfort, if OK with her primary care provider     8) Only pump after breastfeeding if breasts continue to feel very full and uncomfortable  If mother needs to continue pumping after feedings for more than a few days, she may have an oversupply of milk  She should be evaluated by a lactation consultant or a knowledgeable physician  We will follow up as scheduled

## 2019-12-10 NOTE — TELEPHONE ENCOUNTER
[facility] called - having issues with baby latching - (EVITA Jeffrey 89  ) - they had a rough night & she feels there is an issue with the latch - milk is coming in & breasts are full - she is hand expressing to try to soften so the baby can latch - mom calling it the "transistion period"     Appt set for  - mom would like a call prior to discuss what can she can do now till she comes in

## 2019-12-12 ENCOUNTER — OFFICE VISIT (OUTPATIENT)
Dept: POSTPARTUM | Facility: CLINIC | Age: 39
End: 2019-12-12
Payer: COMMERCIAL

## 2019-12-12 VITALS — DIASTOLIC BLOOD PRESSURE: 70 MMHG | SYSTOLIC BLOOD PRESSURE: 104 MMHG

## 2019-12-12 DIAGNOSIS — O92.13 CRACKED NIPPLE ASSOCIATED WITH LACTATION: ICD-10-CM

## 2019-12-12 DIAGNOSIS — O92.79 PAIN AGGRAVATED BY BREAST FEEDING: ICD-10-CM

## 2019-12-12 DIAGNOSIS — R52 PAIN AGGRAVATED BY BREAST FEEDING: ICD-10-CM

## 2019-12-12 DIAGNOSIS — Z71.89 ENCOUNTER FOR BREAST FEEDING COUNSELING: Primary | ICD-10-CM

## 2019-12-12 PROCEDURE — 99404 PREV MED CNSL INDIV APPRX 60: CPT | Performed by: PEDIATRICS

## 2019-12-12 NOTE — PROGRESS NOTES
INITIAL BREAST FEEDING EVALUATION    Informant/Relationship: Yasmine Payne and Mary Ann GASPAR Jurist    Discussion of General Lactation Issues: Yasmine Payne feels breastfeeding is improving  Carly Falcon is latching consistently and gaining weight well  Yasmine Payne still has some issues with engorgement (she is only offering one breast per feed and the other breast gets uncomfortable before the next feeding)  Infant is 10days old today   History:  Fertility Problem:no  Breast changes:yes - breasts got larger  Nipples and areola got larger and darker    : yes - induced due to prolonged rupture of membranes  Full term:yes - 44 5/7 weeks   labor:no  First nursing/attempt < 1 hour after birth:yes - baby latched with assistance  Skin to skin following delivery:yes - until after the first feeding  Breast changes after delivery:yes - milk came in on day 3  Rooming in (infant in room with mother with exception of procedures, eg  Circumcision: no went to the nursery on the second night  Blood sugar issues:no  NICU stay:no  Jaundice:no  Phototherapy:no  Supplement given: (list supplement and method used as well as reason(s):no    Past Medical History:   Diagnosis Date    Abnormal Pap smear of cervix     HPV (human papilloma virus) infection     Varicella     had one pox, unsure if immuned         Current Outpatient Medications:     acetaminophen (TYLENOL) 325 mg tablet, Take 2 tablets (650 mg total) by mouth every 4 (four) hours as needed for mild pain or headaches, Disp: 30 tablet, Rfl: 0    docusate sodium (COLACE) 100 mg capsule, Take 1 capsule (100 mg total) by mouth 2 (two) times a day as needed for constipation, Disp: 10 capsule, Rfl: 0    famotidine (PEPCID) 20 mg tablet, Take 1 tablet (20 mg total) by mouth 2 (two) times a day, Disp: 180 tablet, Rfl: 0    ibuprofen (MOTRIN) 600 mg tablet, Take 1 tablet (600 mg total) by mouth every 6 (six) hours as needed (cramping), Disp: 30 tablet, Rfl: 0    Prenatal MV & Min w/FA-DHA (PRENATAL ADULT GUMMY/DHA/FA) 0 4-25 MG CHEW, Chew 2 tablets daily at bedtime, Disp: , Rfl:     Allergies   Allergen Reactions    Mold Extract [Trichophyton] Sneezing    Other      Annotation - [id number]: [place]    Pollen Extract        Social History     Substance and Sexual Activity   Drug Use No       Social History Never a smoker    Interval Breastfeeding History:    Frequency of breast feeding: Every 1 5-2 5 hours  Does mother feel breastfeeding is effective: Yes  Does infant appear satisfied after nursing:Yes  Stooling pattern normal: Yes  Urinating frequently:Yes  Using shield or shells: No    Alternative/Artificial Feedings:   Bottle: No  Cup: No  Syringe/Finger: Yes, a few times when Beth could not latch           Formula Type: none                     Amount: n/a            Breast Milk:                      Amount: 15ml to supplement            Frequency Q 1 5-2 5 Hr between feedings  Elimination Problems: No      Equipment:  Nipple Shield             Type: none             Size: n/a             Frequency of Use: n/a  Pump            Type: Medela Pump in Style            Frequency of Use: occasionally for comfort  Shells            Type: none            Frequency of use: n/a    Equipment Problems: no    Mom:  Breast: Medium sized symmetrical breasts  Right breast currently very full with palpable firmness in the lateral quadrants  Nipple Assessment in General: Flat nipples bilaterally which loki very slightly with stimulation  Scabs on the face of both nipples  Mother's Awareness of Feeding Cues                 Recognizes: Yes                  Verbalizes: Yes  Support System: FOB, extended family  History of Breastfeeding: none  Changes/Stressors/Violence: Mayra Boo has been stressed about feeding but is feeling better about things today  Concerns/Goals: Mayra Boo wants to be calm and confident while feeding and a well fed baby      Problems with Mom: Anxiety related to caring for her   Physical Exam   Constitutional: She is oriented to person, place, and time  She appears well-developed and well-nourished  HENT:   Head: Normocephalic and atraumatic  Neck: Normal range of motion  Neck supple  Cardiovascular: Normal rate, regular rhythm, normal heart sounds and intact distal pulses  Pulmonary/Chest: Effort normal and breath sounds normal    Musculoskeletal: Normal range of motion  She exhibits no edema  Neurological: She is alert and oriented to person, place, and time  Skin: Skin is warm and dry  Psychiatric: She has a normal mood and affect  Her behavior is normal  Judgment and thought content normal        Infant:  Behaviors: Sleepy  Color: Pink  Birth weight: 3232gram  Current weight: 3190gram    Problems with infant: trouble with latch- resolving  General Appearance:  Alert, active, no distress                            Head:  Normocephalic, AFOF, sutures                             Eyes:   Conjunctiva clear, no drainage                            Ears:   Normally placed, no anomolies                           Nose:   no drainage or erythema                          Mouth:  No lesions  Tongue extends beyond the lower lip, lateralizes well and tip elevates to mid mouth  Complete cupping of my finger while sucking with peristalsis                    Neck:  Supple, symmetrical, trachea midline                Respiratory:  No grunting, flaring, retractions, breath sounds clear and equal           Cardiovascular:  Regular rate and rhythm  No murmur  Adequate perfusion/capillary refill   Femoral pulse present                  Abdomen:    Soft, non-tender, no masses, bowel sounds present, no HSM            Genitourinary:  Normal female genitalia, anus patent                         Spine:   No abnormalities noted       Musculoskeletal:   Full range of motion         Skin/Hair/Nails:   Skin warm, dry, and intact, no rashes, jaundice to umbilicus Neurologic:   No abnormal movement, tone appropriate for gestational age     Latch:  Efficiency:               Lips Flanged: Yes              Depth of latch: wide              Audible Swallow: Yes              Visible Milk: Yes              Wide Open/ Asymmetrical: Yes              Suck Swallow Cycle: Breathing: unlabored, Coordinated: yes  Nipple Assessment after latch: Normal: elongated/eraser, no discoloration and no damage noted  Latch Problems: None  Deepti Peace positioned [place] independently and Beth latched effectively on the first try and nursed until she was content  Makenna's breast was comfortably emptied after the feeding  Position:  Infant's Ergonomics/Body               Body Alignment: Yes               Head Supported: Yes               Close to Mom's body/ Lifted/ Supported: Yes               Mom's Ergonomics/Body: Yes                           Supported: Yes                           Sitting Back: Yes                           Brings Baby to her breast: Yes  Positioning Problems: None      Handouts:   Paced bottle feeding    Education:  Reviewed Alternative/Artificial Feedings: Discussed and demonstrated paced bottle feeding  Reviewed Mom/Breast care: Discussed moist wound treatment for nipple damage        Plan:  On demand feeding  Moist wound treatment for nipple damage  Call with concerns  I have spent 75 minutes with Patient and family today in which greater than 50% of this time was spent in counseling/coordination of care regarding Patient and family education

## 2019-12-12 NOTE — PATIENT INSTRUCTIONS
Continue to feed on demand paying attention to positioning for a more comfortable latch  To help your nipples heal, in addition to paying close attention to latch, apply protective ointment after feeding or pumping and cover with an occlusive dressing like wax paper  Do this until your nipples have completely healed    Please call with questions or concerns

## 2019-12-15 NOTE — PROGRESS NOTES
I have reviewed the notes, assessments, and/or procedures performed by Ben Casarez RN, IBCLC, I concur with her/his documentation of Simona Khalil MD 12/14/19

## 2019-12-16 LAB — PLACENTA IN STORAGE: NORMAL

## 2019-12-30 ENCOUNTER — POSTPARTUM VISIT (OUTPATIENT)
Dept: OBGYN CLINIC | Facility: CLINIC | Age: 39
End: 2019-12-30

## 2019-12-30 VITALS — BODY MASS INDEX: 27.28 KG/M2 | WEIGHT: 169 LBS | DIASTOLIC BLOOD PRESSURE: 66 MMHG | SYSTOLIC BLOOD PRESSURE: 118 MMHG

## 2019-12-30 DIAGNOSIS — Z30.40 ENCOUNTER FOR SURVEILLANCE OF CONTRACEPTIVES, UNSPECIFIED CONTRACEPTIVE: ICD-10-CM

## 2019-12-30 PROCEDURE — 99024 POSTOP FOLLOW-UP VISIT: CPT | Performed by: OBSTETRICS & GYNECOLOGY

## 2019-12-30 RX ORDER — NORETHINDRONE ACETATE AND ETHINYL ESTRADIOL AND FERROUS FUMARATE 1MG-20(24)
1 KIT ORAL DAILY
Qty: 84 TABLET | Refills: 1 | Status: SHIPPED | OUTPATIENT
Start: 2019-12-30 | End: 2020-06-04

## 2019-12-31 PROBLEM — Z34.93 VERTEX PRESENTATION OF FETUS IN THIRD TRIMESTER: Status: RESOLVED | Noted: 2019-10-25 | Resolved: 2019-12-31

## 2019-12-31 PROBLEM — Z3A.39 39 WEEKS GESTATION OF PREGNANCY: Status: RESOLVED | Noted: 2019-06-01 | Resolved: 2019-12-31

## 2019-12-31 PROBLEM — Z34.83 PRENATAL CARE, SUBSEQUENT PREGNANCY IN THIRD TRIMESTER: Status: RESOLVED | Noted: 2019-07-01 | Resolved: 2019-12-31

## 2019-12-31 PROBLEM — O09.523 ELDERLY MULTIGRAVIDA, THIRD TRIMESTER: Status: RESOLVED | Noted: 2019-06-01 | Resolved: 2019-12-31

## 2019-12-31 NOTE — PROGRESS NOTES
Assessment/Plan     Normal postpartum exam      1  Contraception: OCP (estrogen/progesterone) - will start at 6 weeks PP  2  Annual exam due in March  3  Lactation consult, [address] information discussed  4  Increase activity as tolerated, may resume all normal activity  5  Anticipated return to work: 6 - 12 weeks post partum  Clem Long is a 44 y o  female who presents for a postpartum visit  She is 3 weeks postpartum following a spontaneous vaginal delivery  I have fully reviewed the prenatal and intrapartum course  The delivery was at 44 gestational weeks  Anesthesia: epidural  Laceration: 2nd degree  Bleeding staining only  Bowel function is normal  Bladder function is improving with mild leakage  Patient has not been sexually active  Desired contraception method is OCP (estrogen/progesterone)  Postpartum depression screening: negative  EPDS : 10    Baby's course has been uneventful     Baby is feeding by breast     Last Pap : 2019 ; no abnormalities  Gestational Diabetes: no  Pregnancy Complications: none    The following portions of the patient's history were reviewed and updated as appropriate: allergies, current medications, past family history, past medical history, past social history, past surgical history and problem list       Current Outpatient Medications:     acetaminophen (TYLENOL) 325 mg tablet, Take 2 tablets (650 mg total) by mouth every 4 (four) hours as needed for mild pain or headaches, Disp: 30 tablet, Rfl: 0    docusate sodium (COLACE) 100 mg capsule, Take 1 capsule (100 mg total) by mouth 2 (two) times a day as needed for constipation, Disp: 10 capsule, Rfl: 0    famotidine (PEPCID) 20 mg tablet, Take 1 tablet (20 mg total) by mouth 2 (two) times a day, Disp: 180 tablet, Rfl: 0    ibuprofen (MOTRIN) 600 mg tablet, Take 1 tablet (600 mg total) by mouth every 6 (six) hours as needed (cramping), Disp: 30 tablet, Rfl: 0    Iron Combinations (IRON COMPLEX PO), Take by mouth, Disp: , Rfl:     Prenatal MV & Min w/FA-DHA (PRENATAL ADULT GUMMY/DHA/FA) 0 4-25 MG CHEW, Chew 2 tablets daily at bedtime, Disp: , Rfl:     norethindrone-ethinyl estradiol-ferrous fumarate (LOESTIN 24 FE) 1-20 MG-MCG(24) per tablet, Take 1 tablet by mouth daily, Disp: 84 tablet, Rfl: 1    Allergies   Allergen Reactions    Mold Extract [Trichophyton] Sneezing    Other      Annotation - [id number]: [place]    Pollen Extract        Review of Systems  Constitutional: no fever, feels well  Breasts: no complaints of breast pain, breast lump, or nipple discharge  Gastrointestinal: no complaints nausea, vomiting  Genitourinary: as noted in HPI  Neurological: no complaints of headache      Objective      /66   Wt 76 7 kg (169 lb)   LMP 03/03/2019 (Exact Date)   Breastfeeding?  Yes   BMI 27 28 kg/m²     OBGyn Exam  General: alert and oriented, in no acute distress

## 2020-01-02 ENCOUNTER — TELEPHONE (OUTPATIENT)
Dept: POSTPARTUM | Facility: CLINIC | Age: 40
End: 2020-01-02

## 2020-01-02 NOTE — TELEPHONE ENCOUNTER
Spoke with Shyla Holcomb - she has found a large lump in left breast by her nipple, which is very sore & cracked  She has tried massaging with a breast massager & warm compresses & nothing is helping - she is also experiencing a burning sensation in the left as well  Obey Stewart is now 2 wk  o  Appt set if needed for 1/3

## 2020-01-02 NOTE — TELEPHONE ENCOUNTER
Lidia Jameson is having burning nipple pain on the right side which starts after feeding and can last up to 25 minutes  Her nipple does not appear distorted with feeding but does get darker red when she has the pain  That nipple still has cracks at the base and scabs on the tip  She also has a firm area in her left breast for the last 24 hours  It is at about 12 o'clock and the size of a quarter  It is tender to the touch  There is no erythema  She denies fever or chills  Brianna Samson is able to nurse well on that breast but Lidia Jameson feels she is not able to drain the breast well  The nipple on this breast is not healing well  Plugged Ducts During Lactation      x Use heat on the breast for 5-10 minutes before nursing  This can be done with a heating pad, moist warm towels, or hand warmers  x Nurse the baby on that side more often, at least every 2 hours  If not nursing, pump that side every 2 hours  Changing breastfeeding positions can also help work out a plug  x Try to massage the plugged area while nursing or pumping to help drain the area  x Try a vibrating electric toothbrush ross over the plugged area for a few minutes before nursing or pumping   x Use ibuprofen or acetaminophen as needed for discomfort, if approved with her primary care provider  x If the area is not drained well by 48 hours, she should be seen by her primary care provider and ideally a lactation consultant  Vasospasm and Breastfeeding     xShe can apply heat immediately after nursing by using a heating pad on low, applied directly over bra after nursing  x If pain occurs between feedings when nipples become cold, keep nipples warm with sufficient clothing  Lidia Jameson will follow up as scheduled tomorrow

## 2020-01-03 ENCOUNTER — OFFICE VISIT (OUTPATIENT)
Dept: POSTPARTUM | Facility: CLINIC | Age: 40
End: 2020-01-03
Payer: COMMERCIAL

## 2020-01-03 VITALS — DIASTOLIC BLOOD PRESSURE: 70 MMHG | SYSTOLIC BLOOD PRESSURE: 112 MMHG

## 2020-01-03 DIAGNOSIS — O92.70 LACTATION PROBLEM: ICD-10-CM

## 2020-01-03 DIAGNOSIS — R23.8 VESICULAR RASH: Primary | ICD-10-CM

## 2020-01-03 DIAGNOSIS — Z71.89 ENCOUNTER FOR BREAST FEEDING COUNSELING: Primary | ICD-10-CM

## 2020-01-03 DIAGNOSIS — O92.79 CLOGGED DUCT, POSTPARTUM: ICD-10-CM

## 2020-01-03 PROCEDURE — 99404 PREV MED CNSL INDIV APPRX 60: CPT | Performed by: PEDIATRICS

## 2020-01-03 RX ORDER — VALACYCLOVIR HYDROCHLORIDE 1 G/1
1000 TABLET, FILM COATED ORAL 2 TIMES DAILY
Qty: 14 TABLET | Refills: 0 | Status: SHIPPED | OUTPATIENT
Start: 2020-01-03 | End: 2020-01-21 | Stop reason: ALTCHOICE

## 2020-01-03 NOTE — PROGRESS NOTES
Call received about patient from [facility] lactation specialist, [facility], who had evaluated patient for clogged duct and burning nipple pain on right breast   On evaluation patient was found to have vesicular rash on breast causing concern for possible bacterial or viral infection  Patient received instructions from lactation specialist for treatment of rash with topical ointment and bacitracin as well as instructions for breast-feeding and instructions for breast milk as patient is currently breastfeeding  Recommendation was to have viral cultures for possible herpes simplex as well as bacterial cultures of the vesicular lesions performed  Discussion was had with urgent Care staff at [facility] urgent care office for patient to arrive and have lesion swabbed at that location  Confirmation of correct bacterial culture and viral culture swab performed  Herpes simplex viral culture and wound bacterial culture with Gram stain ordered  Instructions were provided to patient and address for urgent Care office provided for patient to have swabs obtained  Prescription provided for Valtrex 1000 mg b i d  For 7 days for empiric treatment of herpes simplex to be started tonight  Patient verbalized understanding of all instructions that were provided as appreciative of call and coordination of care

## 2020-01-03 NOTE — PATIENT INSTRUCTIONS
Send specimens for viral and bacterial cultures of the sores on your areola  Dr Shakeel Bhat will contact you with instructions  Continue with frequent and effective milk removal to prevent engorgement and help resolve blocked areas  Do not feed expressed milk until culture results are back  After pumping, cover nipples and areola with bacitracin and protective ointment and then cover with a non-stick dressing like wax paper  Careful hand and pump hygiene  Once sores on the areola have been treated, if blocked ducts persist or you are unable to effectively empty the breasts when pumping, call for information about therapeutic breast ultrasound  Please call with any questions or concerns

## 2020-01-03 NOTE — PROGRESS NOTES
BREAST FEEDING FOLLOW UP VISIT    Informant/Relationship: Orrie Siemens    Discussion of General Lactation Issues: Orrie Siemens feels breastfeeding was going well until about 10 days ago when Riana began to appear frustrated at the breast and would pull on the nipple after the initial letdown was finished  At that point, Makenna's nipples became sore and damaged again  For the last two days, Orrie Siemens has a tender lump in her left breast just above the areola at about 11 o'clock  She has been doing moist heat, massage and frequent pumping and she is feeling better although her symptoms have not completely resolved  She has been exclusively pumping and bottle feeding since yesterday due to her pain  She is unable to express all that Riana needs and has supplemented with formula since yesterday  Infant is 2 weeks old today  Interval Breastfeeding History:    Frequency of breast feeding: Until yesterday every 1 5-3 hours on demand  Has not been feeding at the breast for the last 2 days  Does mother feel breastfeeding is effective: No  Does infant appear satisfied after nursing:No  Stooling pattern normal:Yes  Urinating frequently:Yes  Using shield or shells:No    Alternative/Artificial Feedings:   Bottle: Yes, currently for every feeding  Cup: No  Syringe/Finger: No           Formula Type: [employer]                     Amount: 3 ounces            Breast Milk:                      Amount: 2 ounces            Frequency Q 1 5-3 Hr between feedings  Elimination Problems: No      Equipment:  Nipple Shield             Type: none             Size: n/a             Frequency of Use: n/a  Pump            Type: Medela Pump in Style            Frequency of Use: Every 2 hours  Able to express about 2-3 ounces per session  Shells            Type: none            Frequency of use: n/a    Equipment Problems: no      Mom:  Breast: Medium sized symmetrical breasts  Right breast currently very full    Left breast with several palpable firm areas in the upper quadrants  Tender on light palpation  Nipple Assessment in General: Flat nipples bilaterally which loki very slightly with stimulation  Areola with many open lesions with crusted coating  These lesions began as blisters about a week ago per Renita Brewer  Mother's Awareness of Feeding Cues                 Recognizes: Yes                  Verbalizes: Yes  Support System: FOB, extended family  History of Breastfeeding: none  Changes/Stressors/Violence: Renita Brewer is concerned about her pain and her milk supply  Concerns/Goals: Renita Brewer wants to be calm and confident while feeding and have a well fed baby      Problems with Mom: Stress and anxiety related to care for her , concerns with supply and breast and nipple pain      Physical Exam   Constitutional: She is oriented to person, place, and time  She appears well-developed and well-nourished  HENT:   Head: Normocephalic and atraumatic  Neck: Normal range of motion  Neck supple  Cardiovascular: Normal rate, regular rhythm, normal heart sounds and intact distal pulses  Pulmonary/Chest: Effort normal and breath sounds normal    Musculoskeletal: Normal range of motion  She exhibits no edema  Neurological: She is alert and oriented to person, place, and time  Skin: Skin is warm and dry  Psychiatric: She has a normal mood and affect  Her behavior is normal  Judgment and thought content normal         Handouts:   None    Education:  Reviewed Mom/Breast care: Discussed treatment for blocked ducts, effective milk removal, moist wound treatment for lesions on areola, viral and bacterial cultures of lesions per Dr Erwin Gonzales  Plan:  Viral and bacterial cultures of areolar lesions per Dr Erwin Brumfield aware and will arrange for collection and processing of specimens  Moist wound care for sore areola with protective ointment and bacitracin per Dr Erwin Gonzales  Frequent and effective milk removal by pumping   Do not use milk until test results known  Careful hand and pump hygiene  Monitor infant for fever, lesions or any concerning behavior and contact Peds as needed  I have spent 60 minutes with Patient  today in which greater than 50% of this time was spent in counseling/coordination of care regarding Patient and family education

## 2020-01-05 NOTE — PROGRESS NOTES
I have reviewed the notes, assessments, and/or procedures performed by Tony Lui RN, IBCLC, I concur with her/his documentation of King Trujillo MD 01/05/20

## 2020-01-06 ENCOUNTER — TELEPHONE (OUTPATIENT)
Dept: POSTPARTUM | Facility: CLINIC | Age: 40
End: 2020-01-06

## 2020-01-06 NOTE — TELEPHONE ENCOUNTER
I called Charlotte Anders to check on how she is feeling  She reports that she has been able to clear the blocked ducts she was experiencing last week and the rash on her areola has improved  She reports she is having less pain  I encouraged Charlotte Anders to continue with the current plan until the final cultures are back  Charlotte Anders verbalized understanding and will call back with any questions or concerns

## 2020-01-07 ENCOUNTER — TELEPHONE (OUTPATIENT)
Dept: POSTPARTUM | Facility: CLINIC | Age: 40
End: 2020-01-07

## 2020-01-07 DIAGNOSIS — B95.8 STAPH SKIN INFECTION: Primary | ICD-10-CM

## 2020-01-07 DIAGNOSIS — L08.9 STAPH SKIN INFECTION: Primary | ICD-10-CM

## 2020-01-07 RX ORDER — CEPHALEXIN 500 MG/1
500 CAPSULE ORAL EVERY 6 HOURS SCHEDULED
Qty: 28 CAPSULE | Refills: 0 | Status: SHIPPED | OUTPATIENT
Start: 2020-01-07 | End: 2020-01-14

## 2020-01-07 NOTE — TELEPHONE ENCOUNTER
I spoke with Dr Lavinia Bello about Makenna's bacterial culture results  She will reach out to [facility] regarding treatment  I spoke with [facility] who states she continues to improve  The lesions are healing, her pain has decreased and her blocked ducts have resolved  We discussed that once the lesions have completely healed, she can put Beth back to the breast   Currently Andressa Stover has no concerning symptoms  We will await viral culture results to determine what to do with the milk [facility] is expressing since her symptoms began  [facility] verbalized understanding and will call back with any questions or concerns

## 2020-01-14 DIAGNOSIS — K21.9 GASTROESOPHAGEAL REFLUX DISEASE WITHOUT ESOPHAGITIS: ICD-10-CM

## 2020-01-15 RX ORDER — FAMOTIDINE 20 MG/1
TABLET, FILM COATED ORAL
Qty: 180 TABLET | Refills: 0 | OUTPATIENT
Start: 2020-01-15

## 2020-01-21 ENCOUNTER — OFFICE VISIT (OUTPATIENT)
Dept: FAMILY MEDICINE CLINIC | Facility: CLINIC | Age: 40
End: 2020-01-21
Payer: COMMERCIAL

## 2020-01-21 VITALS
OXYGEN SATURATION: 98 % | RESPIRATION RATE: 16 BRPM | DIASTOLIC BLOOD PRESSURE: 68 MMHG | BODY MASS INDEX: 25.71 KG/M2 | SYSTOLIC BLOOD PRESSURE: 104 MMHG | WEIGHT: 160 LBS | HEIGHT: 66 IN | HEART RATE: 86 BPM | TEMPERATURE: 98.9 F

## 2020-01-21 DIAGNOSIS — D64.9 ANEMIA, UNSPECIFIED TYPE: ICD-10-CM

## 2020-01-21 DIAGNOSIS — J30.2 SEASONAL ALLERGIES: ICD-10-CM

## 2020-01-21 DIAGNOSIS — Z00.00 ANNUAL PHYSICAL EXAM: Primary | ICD-10-CM

## 2020-01-21 DIAGNOSIS — F41.9 ANXIETY: ICD-10-CM

## 2020-01-21 PROCEDURE — 99395 PREV VISIT EST AGE 18-39: CPT | Performed by: FAMILY MEDICINE

## 2020-01-21 RX ORDER — FEXOFENADINE HCL 180 MG/1
180 TABLET ORAL DAILY
Start: 2020-01-21

## 2020-01-21 RX ORDER — FLUTICASONE PROPIONATE 50 MCG
2 SPRAY, SUSPENSION (ML) NASAL DAILY
Qty: 1 BOTTLE | Refills: 3 | Status: SHIPPED | OUTPATIENT
Start: 2020-01-21

## 2020-01-21 NOTE — PROGRESS NOTES
Assessment/Plan:       Problem List Items Addressed This Visit     None      Visit Diagnoses     Annual physical exam    -  Primary- Reviewed health maintenance/preventive care  Discussed healthy diet and exercise/activity as able  Reviewed appropriate vaccinations- up to date  Anemia, unspecified type    - recheck in one month    Relevant Orders    CBC and differential    Ferritin    Seasonal allergies    - resume allegra and flonase  To call if ear pressure persists    Relevant Medications    fexofenadine (ALLEGRA) 180 MG tablet    fluticasone (FLONASE) 50 mcg/act nasal spray    Anxiety        refer [facility] at [facility]  declines medication at this point  Subjective:     Deepti Peace is a 44 y o  female here today and has the below chronic conditions:    Patient Active Problem List   Diagnosis    History of loop electrical excision procedure (LEEP)    Hereditary disease in family possibly affecting fetus     Current Outpatient Medications   Medication Sig Dispense Refill    docusate sodium (COLACE) 100 mg capsule Take 1 capsule (100 mg total) by mouth 2 (two) times a day as needed for constipation (Patient taking differently: Take 100 mg by mouth daily ) 10 capsule 0    famotidine (PEPCID) 20 mg tablet Take 1 tablet (20 mg total) by mouth 2 (two) times a day 180 tablet 0    Iron Combinations (IRON COMPLEX PO) Take 1 capsule by mouth daily       Prenatal MV & Min w/FA-DHA (PRENATAL ADULT GUMMY/DHA/FA) 0 4-25 MG CHEW Chew 2 tablets daily at bedtime      norethindrone-ethinyl estradiol-ferrous fumarate (LOESTIN 24 FE) 1-20 MG-MCG(24) per tablet Take 1 tablet by mouth daily (Patient not taking: Reported on 1/3/2020) 84 tablet 1     No current facility-administered medications for this visit  HPI:  Chief Complaint   Patient presents with    Physical Exam    Ear Fullness     right ear, effecting hearing      - CC above per clinical staff and reviewed      Pt here for annual PE  Has  girl [de-identified] old  Ear not painful but feels blocked on the right  Left ear feels ok  No congestion or sinus sx  Allergies can cause congestion for her sometimes  No fever  Moving bowels normally  Takes pepcid and colace    Anxiety is worse  Has baseline anxiety, never needed treatment  After baby born, it is worse  More emotional   Doesn't feel she needs medication but would like to see therapist     Was seeing [facility] for lact[facility]  Had mastitis, cracking of nipples  Nurses in the AM, then pumps during the day, has some formula and breastmilk      The following portions of the patient's history were reviewed and updated as appropriate: allergies, current medications, past family history, past medical history, past social history, past surgical history and problem list     ROS:  Review of Systems   No fever, chills, congestion, chest pain, shortness of breath, nausea, vomiting, diarrhea, constipation, blood in stool, urinary concerns  Rest of ROS neg except as above  Objective:      /68   Pulse 86   Temp 98 9 °F (37 2 °C) (Tympanic)   Resp 16   Ht 5' 6" (1 676 m)   Wt 72 6 kg (160 lb)   SpO2 98%   BMI 25 82 kg/m²   BP Readings from Last 3 Encounters:   20 104/68   20 112/70   19 118/66     Wt Readings from Last 3 Encounters:   20 72 6 kg (160 lb)   19 76 7 kg (169 lb)   19 88 9 kg (196 lb)               Physical Exam:   Physical Exam   Constitutional: She is oriented to person, place, and time  She appears well-developed and well-nourished  HENT:   Head: Normocephalic and atraumatic  Nose: Nose normal    Mouth/Throat: Oropharynx is clear and moist    Effusions b/l TMs, no erythema  Canals normal b/l   Eyes: Conjunctivae are normal    Neck: Neck supple  Cardiovascular: Normal rate, regular rhythm and normal heart sounds  No murmur heard  Pulmonary/Chest: Effort normal and breath sounds normal  No respiratory distress  She has no wheezes  Abdominal: Soft  There is no tenderness  There is no rebound and no guarding  Musculoskeletal: She exhibits no edema  Lymphadenopathy:     She has no cervical adenopathy  Neurological: She is alert and oriented to person, place, and time  Skin: Skin is warm and dry  Psychiatric: She has a normal mood and affect  Her behavior is normal    Nursing note and vitals reviewed  BMI Counseling: Body mass index is 25 82 kg/m²  The BMI is above normal  Nutrition recommendations include encouraging healthy choices of fruits and vegetables

## 2020-01-26 ENCOUNTER — OFFICE VISIT (OUTPATIENT)
Dept: URGENT CARE | Facility: CLINIC | Age: 40
End: 2020-01-26
Payer: COMMERCIAL

## 2020-01-26 VITALS
HEART RATE: 123 BPM | DIASTOLIC BLOOD PRESSURE: 62 MMHG | WEIGHT: 153 LBS | OXYGEN SATURATION: 96 % | RESPIRATION RATE: 16 BRPM | BODY MASS INDEX: 24.59 KG/M2 | SYSTOLIC BLOOD PRESSURE: 105 MMHG | TEMPERATURE: 100.3 F | HEIGHT: 66 IN

## 2020-01-26 DIAGNOSIS — N61.0 MASTITIS: Primary | ICD-10-CM

## 2020-01-26 PROCEDURE — 99213 OFFICE O/P EST LOW 20 MIN: CPT | Performed by: NURSE PRACTITIONER

## 2020-01-26 RX ORDER — SULFAMETHOXAZOLE AND TRIMETHOPRIM 800; 160 MG/1; MG/1
1 TABLET ORAL EVERY 12 HOURS SCHEDULED
Qty: 20 TABLET | Refills: 0 | Status: SHIPPED | OUTPATIENT
Start: 2020-01-26 | End: 2020-02-05

## 2020-01-26 NOTE — PROGRESS NOTES
[facility]        NAME: Jossie Harris is a 44 y o  female  : 1980    MRN: [id number]  DATE: 2020  TIME: 12:26 PM    Assessment and Plan   Mastitis [N61 0]  1  Mastitis  sulfamethoxazole-trimethoprim (BACTRIM DS) 800-160 mg per tablet         Patient Instructions     Bactrim twice a day for 10 days  Tylenol/Motrin as needed for pain   Massage the breast  Warm compressed  Nurse or pump often   Increase fluid intake   Follow up with OB/GYN is symptoms persist or worsen    Follow up with PCP in 3-5 days  Proceed to  ER if symptoms worsen  Chief Complaint     Chief Complaint   Patient presents with    Breast Problem     yesterday symptoms started, fever, aches, breast red in localized area, tender, headache, taking motrin         History of Present Illness       Patient is a 44year old female presenting with right breast tenderness and redness that started yesterday  Fever started today  She is breastfeeding  Her infant is 7 weeks  Born full term and healthy  Infant has some latching trouble  She is pumping and nursing  She has a skin infection on the left breast 2 weeks ago, completed course of keflex  She is taking tylenol for pain and apply warm compresses  Review of Systems   Review of Systems   Constitutional: Positive for chills and fever  Negative for activity change  HENT: Negative for sore throat  Respiratory: Negative for cough and shortness of breath  Gastrointestinal: Negative for constipation, nausea and vomiting  Musculoskeletal: Positive for myalgias  Skin: Positive for color change  Negative for wound  Neurological: Negative for headaches           Current Medications       Current Outpatient Medications:     docusate sodium (COLACE) 100 mg capsule, Take 1 capsule (100 mg total) by mouth 2 (two) times a day as needed for constipation (Patient taking differently: Take 100 mg by mouth daily ), Disp: 10 capsule, Rfl: 0    fexofenadine (ALLEGRA) 180 MG tablet, Take 1 tablet (180 mg total) by mouth daily, Disp: , Rfl:     fluticasone (FLONASE) 50 mcg/act nasal spray, 2 sprays into each nostril daily As needed for allergies/congestion, Disp: 1 Bottle, Rfl: 3    Iron Combinations (IRON COMPLEX PO), Take 1 capsule by mouth daily , Disp: , Rfl:     Prenatal MV & Min w/FA-DHA (PRENATAL ADULT GUMMY/DHA/FA) 0 4-25 MG CHEW, Chew 2 tablets daily at bedtime, Disp: , Rfl:     famotidine (PEPCID) 20 mg tablet, Take 1 tablet (20 mg total) by mouth 2 (two) times a day, Disp: 180 tablet, Rfl: 0    norethindrone-ethinyl estradiol-ferrous fumarate (LOESTIN 24 FE) 1-20 MG-MCG(24) per tablet, Take 1 tablet by mouth daily (Patient not taking: Reported on 1/3/2020), Disp: 84 tablet, Rfl: 1    sulfamethoxazole-trimethoprim (BACTRIM DS) 800-160 mg per tablet, Take 1 tablet by mouth every 12 (twelve) hours for 10 days, Disp: 20 tablet, Rfl: 0    Current Allergies     Allergies as of 01/26/2020 - Reviewed 01/26/2020   Allergen Reaction Noted    Mold extract [trichophyton] Sneezing 11/27/2017    Other GI Intolerance and Vomiting 11/27/2017    Pollen extract Sneezing 11/27/2017            The following portions of the patient's history were reviewed and updated as appropriate: allergies, current medications, past family history, past medical history, past social history, past surgical history and problem list      Past Medical History:   Diagnosis Date    Abnormal Pap smear of cervix     Anxiety     HPV (human papilloma virus) infection     Vaginal delivery 12/2019    Varicella     had one pox, unsure if immuned       Past Surgical History:   Procedure Laterality Date    CERVICAL BIOPSY  W/ LOOP ELECTRODE EXCISION      2015    TONSILLECTOMY      WISDOM TOOTH EXTRACTION         Family History   Problem Relation Age of Onset    Hypertension Mother     Diabetes Mother     Arthritis Mother     Thyroid disease Mother     Varicose Veins Mother     Cancer Father         skin  Other Father         Blood Transfusion    No Known Problems Sister     No Known Problems Brother     Cancer Maternal Grandmother         skin    Cancer Maternal Grandfather         skin    Cancer Paternal Grandmother         skin    Alzheimer's disease Paternal Grandmother     Osteoporosis Paternal Grandmother     Heart attack Paternal Grandfather     Cancer Paternal Grandfather         SKIN    No Known Problems Daughter     Breast cancer Paternal Aunt     Hypertension Family          Medications have been verified  Objective   /62   Pulse (!) 123   Temp 100 3 °F (37 9 °C)   Resp 16   Ht 5' 6" (1 676 m)   Wt 69 4 kg (153 lb)   SpO2 96%   BMI 24 69 kg/m²        Physical Exam     Physical Exam   Constitutional: She is oriented to person, place, and time  She appears well-developed and well-nourished  She is active  No distress  HENT:   Head: Normocephalic  Right Ear: Hearing normal    Left Ear: Hearing normal    Nose: Nose normal    Cardiovascular: Regular rhythm, S1 normal, S2 normal and normal heart sounds  Tachycardia present  Pulmonary/Chest: Effort normal  No respiratory distress  Right breast exhibits skin change and tenderness  There is breast swelling  Neurological: She is alert and oriented to person, place, and time  She is not disoriented  Skin: Skin is warm and dry

## 2020-01-26 NOTE — PATIENT INSTRUCTIONS
Bactrim twice a day for 10 days  Tylenol/Motrin as needed for pain   Massage the breast  Warm compressed  Nurse or pump often   Increase fluid intake   Follow up with OB/GYN is symptoms persist or worsen    Mastitis   WHAT YOU NEED TO KNOW:   Mastitis is an infection of breast tissue that most often occurs in women who breastfeed  It can happen any time during breastfeeding, but usually occurs within the first 3 months after giving birth  Usually only one breast is affected  DISCHARGE INSTRUCTIONS:   Contact your healthcare provider if:   · Your symptoms do not get better within 2 days  · You have a painful lump in your breast      · You have swollen and tender lymph nodes in your armpit on the same side as the affected breast     · You have questions or concerns about your condition or care  Medicines: You may need any of the following:  · Antibiotics  help treat or prevent a bacterial infection  · Acetaminophen  decreases pain and fever  It is available without a doctor's order  Ask how much to take and how often to take it  Follow directions  Acetaminophen can cause liver damage if not taken correctly  · NSAIDs , such as ibuprofen, help decrease swelling, pain, and fever  This medicine is available with or without a doctor's order  NSAIDs can cause stomach bleeding or kidney problems in certain people  If you take blood thinner medicine, always ask your healthcare provider if NSAIDs are safe for you  Always read the medicine label and follow directions  · Take your medicine as directed  Contact your healthcare provider if you think your medicine is not helping or if you have side effects  Tell him or her if you are allergic to any medicine  Keep a list of the medicines, vitamins, and herbs you take  Include the amounts, and when and why you take them  Bring the list or the pill bottles to follow-up visits  Carry your medicine list with you in case of an emergency    Manage your symptoms: · Continue to breastfeed from the affected breast   This will help to prevent an abscess from forming  Breastfeed your baby on the affected side first  Apply a warm, wet cloth on your breast or take a warm shower before you feed your baby  This can help increase your milk flow  If it is painful when you breastfeed from the affected breast, feed your baby from the other breast first  Pump the affected side to completely drain your breast after breastfeeding, if needed  You may save the pumped milk to feed your baby  · Use different positions to breastfeed  Change the position of your baby during feedings  This may help to relieve your discomfort  · Apply heat on your breast for 20 to 30 minutes every 2 hours for as many days as directed  Heat helps decrease pain  · Apply ice after feedings  Apply ice on your breast for 15 to 20 minutes every hour or as directed  Use an ice pack, or put crushed ice in a plastic bag  Cover it with a towel  Ice helps prevent tissue damage and decreases swelling and pain  · Massage your breast   Gently massage your breast before and during breastfeeding to help drain your milk  · Drink liquids as directed  Ask how much liquid to drink each day and which liquids are best for you  · Rest as needed  Do not sleep on your stomach until your infection is gone  Prevent mastitis:   · Breastfeed every 2 or 3 hours to prevent engorgement  Breast engorgement develops when too much milk builds up in your breast  Take your time when you breastfeed to allow your baby to empty your breast  Try not to switch breasts too early  Express or pump after you breastfeed if your baby is not emptying your breasts when he feeds  · Prevent sore and cracked nipples  A good latch prevents sore and cracked nipples  If you have sore nipples after breastfeeding, your baby may not be latched on properly  Gently break suction and reposition if your baby is only sucking on the nipple  Talk to a lactation consultant if you need help with your baby's latch  · Care for your breasts  Keep your nipples clean and dry between feedings  Check them for cracks, blisters, or other irritated areas  Ask a lactation specialist or your healthcare provider how to treat sore and cracked nipples  Wash your hands before and after you breastfeed your baby or pump your breasts  Wear a comfortable nursing bra that supports your breasts but is not too tight  Follow up with your healthcare provider as directed:  Write down your questions so you remember to ask them during your visits  © 2017 [employer] Information is for End User's use only and may not be sold, redistributed or otherwise used for commercial purposes  All illustrations and images included in CareNotes® are the copyrighted property of A D A M , Inc  or Russell Camarena  The above information is an  only  It is not intended as medical advice for individual conditions or treatments  Talk to your doctor, nurse or pharmacist before following any medical regimen to see if it is safe and effective for you

## 2020-02-11 ENCOUNTER — SOCIAL WORK (OUTPATIENT)
Dept: BEHAVIORAL/MENTAL HEALTH CLINIC | Facility: CLINIC | Age: 40
End: 2020-02-11
Payer: COMMERCIAL

## 2020-02-11 DIAGNOSIS — F41.8 POSTPARTUM ANXIETY: Primary | ICD-10-CM

## 2020-02-11 PROCEDURE — 90834 PSYTX W PT 45 MINUTES: CPT | Performed by: SOCIAL WORKER

## 2020-02-11 PROCEDURE — 1036F TOBACCO NON-USER: CPT | Performed by: SOCIAL WORKER

## 2020-02-11 NOTE — PSYCH
Assessment/Plan:      Diagnoses and all orders for this visit:    Postpartum anxiety          Subjective:     Patient ID: Karen Templeton is a 44 y o  female  Orrie Siemens was counseled for 50 minutes from 1:00 - 1:50pm   Referred by OB due to postpartum anxiety after birth of daughter, Xavier Rossi "Fabi Sheehan" on 12/6/19 - vaginal birth at 43 weeks  Good pregnancy, planned, first planned  Had a miscarriage at 8 weeks about one yr ago  A lot of anxiety during pregnancy - did not tell anyone for over 3 months  Birth was ok - water broke - only pushed for 20 minutes - quick birth - there for 12hrs  Some Billirubin issues but ok  Has acid reflex - mixed formula/breast feeding  Many infections/issues with breasts - only on breast once pr day and pumping as often as she feeds  Feels anxiety related to breastfeeding, ability to function and baby eating  Worked until baby born - off until April -  - unpaid leave - 7 days disability only - financial stressors - wants to go back  In September, will use day care 2 days a week  Will be back in April for only 11 days - baby will come with her  , Myah Frazier,  3yrs - he's a teacher as well - both will have summers off  He had one day off  Patient's parents in [place], extended family and brother in [place]  's family near [facility]  Family very supportive but not close  Parents retired and visited and helped 3 times so far  Fabi Sheehan sleeping well - only gets up once per night - 1st month was every hour  Using same formula but reflux - may change - on Ranitidine now and spitting up less - gains weight  Smiling a lot  No counseling before - has had anxiety before but worse it's ever been  Monday night knitting group  Used to work out at [employer] but not now  Wants to pump for six month - pumps 5X a day - about 2 5oz each time  Using cloth diapers - laundry every day - washing bottles/pumpparts    Feels overwhelmed and shuts down when anxious  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

## 2020-02-20 ENCOUNTER — EVALUATION (OUTPATIENT)
Dept: PHYSICAL THERAPY | Facility: REHABILITATION | Age: 40
End: 2020-02-20
Payer: COMMERCIAL

## 2020-02-20 DIAGNOSIS — N81.89 PELVIC FLOOR WEAKNESS: ICD-10-CM

## 2020-02-20 DIAGNOSIS — N39.3 STRESS INCONTINENCE: Primary | ICD-10-CM

## 2020-02-20 DIAGNOSIS — R39.15 URINARY URGENCY: ICD-10-CM

## 2020-02-20 PROCEDURE — 97162 PT EVAL MOD COMPLEX 30 MIN: CPT | Performed by: PHYSICAL THERAPIST

## 2020-02-20 NOTE — PROGRESS NOTES
PT Evaluation     Today's date: 2020  Patient name: Shu López  : 1980  MRN: [id number]  Referring provider: Keyon Berger MD  Dx:   Encounter Diagnosis     ICD-10-CM    1  Stress incontinence N39 3    2  Encounter for postpartum visit Z39 2 Ambulatory referral to Physical Therapy   3  Urinary urgency R39 15    4  Pelvic floor weakness N81 89        Start Time: 1700  Stop Time: 1800  Total time in clinic (min): 60 minutes    Assessment  Assessment details: The patient is a 44 year female with complaints of stress urinary incontinence and urgency since delivery her daughter 10 weeks ago  She had a vaginal delivery with a perineal tear  She demonstrates fair overall strength and pelvic girdle stability at this time  She has good awareness of her pelvic floor muscles and is able to isolate them well  She does demonstrate pelvic floor weakness as well as some difficulty with releasing her pelvic floor muscles after a contraction  She has no pain to report during examination with the exception of mild pain around scar tissue in her left perineum from tear during delivery  She would benefit from skilled pelvic floor therapy to help reduce/manage her symptoms, address her impairments, and maximize her function upon discharge as she would like to return to higher level activities such as working out  Recommended that she follow up with Dr Chuck Rosenthal in regards to a small pin head sized opening in the skin at the posterior fourchette  No erythema or drainage noted at this area  Thank you for the referral  She will be given HEP throughout episode of care with goal of independent exercise program    Impairments: abnormal coordination, abnormal muscle tone, activity intolerance, impaired physical strength, lacks appropriate home exercise program, poor posture  and poor body mechanics  Understanding of Dx/Px/POC: good   Prognosis: good    Goals  ST   The patient will improve pelvic floor muscle strength by 1 grade in 8 weeks  2  The patient will improve pelvic floor muscle endurance to 5 to 10 seconds in 8 weeks  LT  The patient will eliminate urinary urgency upon discharge  2  The patient will perform higher level activities without leakage upon discharge  3  The patient will perform independent exercise program upon discharge  Plan  Patient would benefit from: skilled PT  Planned modality interventions: biofeedback and ultrasound (Real Time Ultrasound)  Planned therapy interventions: manual therapy, neuromuscular re-education, patient education, strengthening, therapeutic exercise, therapeutic training, home exercise program, abdominal trunk stabilization, self care, postural training, therapeutic activities and stretching  Frequency: 1x week  Duration in visits: 8  Duration in weeks: 8  Plan of Care beginning date: 2020  Plan of Care expiration date: 2020  Treatment plan discussed with: patient        PT Pelvic Floor Subjective:   History of Present Illness: The patient reports that she is experiencing leakage of urine since the delivery of her daughter 10 weeks ago  She notes that it is mild but she is really conscious about how much she is drinking because of this  She also has been experiencing urgency as well  She is able to make it to the bathroom most of the time  She notes that this has improved since immediately following delivery  She saw Dr Aurora Sanchez for her 3 week post partum visit and she was cleared her for activity  She will have her annual examination in March         No bleeding or discharge (lochia)  Breast feeding - pumping and bottle feedingMechanism of injury: childbirth    19  Social Support:     Lives with:  Faisal Lin children and spouse    Relationship status: /committed    Work status: employed full time (Return to work in April)  Hand dominance:  Right  Diet and Exercise:    Diet:balanced nutrition    Patient worked out with  3 days per week - circuit training  Worked out with  throughout entire pregnancy  Plans on returning to working out 2 days per week - next week  OB/ gyn History    Gestational History:     Number of prior pregnancies: 2    Number of term pregnancies: 1    Delivery Type: vaginal delivery      Number of vaginal deliveries: 1    Delivery Complications:  Complications during pregnancy: none  Complications during delivery: none; perineal tear during delivery  10 weeks post partum  Patient was 39 weeks at delivery  Daughter was 7 lbs 2 ounces  Second stage of labor - Pushed for 20 minutes    HPV with LEEP procedure in 2015      Menstrual History:      Birth control method: birth control pills  Bladder Function:     Voiding Difficulties positive for: urgency      Voiding Difficulties negative for: hesitancy, straining and incomplete emptying      Voiding Difficulties comments:     Urinary leakage: urine leakage    Urinary leakage aggravated by: sneezing, standing up and walking to the bathroom    Urinary leakage not aggravated by: coughing, bending and post-void dribbleExercise: unsure  Nocturia (episodes per night): 1 (woken up by baby not bladder)    Painful urination: No      Fluid Intake Type: Water    Intake (ounces): Intake (ounces) comment: 50-60 ounces of water per day  Incontinence Management:     Pads/Diaper Use:  None    Pads/Diapers Additional Comments: was wearing a pad until this past week  Bowel Function:     Bowel Function comments:  No constipation/straining  No longer taking Colace    Bowel frequency: daily  Sexual Function:     Sexual activity: has not attempted yet  Pain:     No pain reported by patient  Objective     Static Posture     Head  Forward  Shoulders  Asymmetric shoulders and rounded  Thoracic Spine  Flattened thoracic spine  Lumbar Spine   Increased lordosis       Pelvis   Anterior pelvic tilt    Neurological Testing     Sensation     Lumbar   Left   Intact: light touch    Right Intact: light touch    Active Range of Motion     Lumbar   Flexion:  WFL  Extension:  WFL  Left lateral flexion:  WFL  Right lateral flexion:  WFL    Strength/Myotome Testing     Left Hip   Planes of Motion   Flexion: 4-  Extension: 4-  Abduction: 4-  External rotation: 4-  Internal rotation: 4-    Right Hip   Planes of Motion   Flexion: 4-  Extension: 4-  Abduction: 4-  External rotation: 4-  Internal rotation: 4-    Left Knee   Flexion: 4  Extension: 4    Right Knee   Flexion: 4  Extension: 4    Left Ankle/Foot   Dorsiflexion: 4  Plantar flexion: 4  Great toe extension: 4    Right Ankle/Foot   Dorsiflexion: 4  Plantar flexion: 4  Great toe extension: 4  Pelvic Floor Exam   Position: supine exam  Abdominal assessment: Soft and non tender    Diastatis   Diastasis recti present? no  3" above umbilicus (# fingers): 0  Umbilicus (# fingers): 1  3" below umbilicus (# fingers): 0  Connective tissue integrity at linea alba: firm  no tenderness at linea alba  Palpation of linea alba:No doming/bulging at the linea alba  Single knee to chest: good control   Both knees to chest: less control but no bulging/diastasis    Skin inspection:   scars present  Number of scars: 1  Additional skin inspection details: Perineal scar from tearing during delivery  Scar tissue palpable in left perineum - patient has mild pain near vaginal introitus  Scar 1 location:  Sensitivity level low Restriction level low     General Perineum Exam:   Perineum intact: pinhole opening at posterior fourchette; no erythema or drainage but recommended patient follow up with OB in regards to this     Negative for swelling, descent, no pelvic organ prolapse at rest and perianal erythema (some dryness noted)    General perineum exam comments: Pelvic floor verbal consent and written consent signed and in chart    Education provided today:   Time Spent on Patient Education:   Pelvic floor anatomy and function  Physiology/relationship of abdominal canister and pelvis/pelvic organs/pelvic floor muscles  Bowel and Bladder anatomy and function  Importance of body mechanics and ergonomics in regards to protecting against activities which increase IAP and pressure - discussion in regards to returning to gym   PT exam and course of treatment      Visual Inspection of Perineum:   Excursion of perineal body in cephalad direction with contraction of pelvic floor muscles (PFM): weak  Excursion of perineal body in caudal direction with relaxation of pelvic floor muscles (PFM): good   Involuntary contraction with coughing: no  Cotton swab test: non-tender  Cough reflex: no cough reflex  Sensation: intact    Pelvic Organ Prolapse   no pelvic organ prolapse  Position: hook-lyingno pelvic organ prolapse at rest    Pelvic Floor Muscle Exam     Palpation   No increased muscle tension in the bulbospongiosus, ischiocavernosus, puborectalis, pubococcygeus, iIliococcygeus, coccygeus, obturator internus and periurethral  Minimal increased muscle tension in the super transverse perineal  No tenderness on right in the ischiocavernosus, super transverse perineal, puborectalis, pubococcygeus, iliococcygeus, coccygeus, obturator internus and periurethral  No tenderness on left in the bulbospongiosus, ischiocavernosus, puborectalis, pubococcygeus, iliococcygeus, coccygeus, obturator internus and periurethral  Minimal tenderness on left in the super transverse perineal    Muscle Contraction: well isolated  Pelvic floor muscle relaxation is delayed  5 seconds required for complete relaxation       PERFECT Score   Power right: 2/5  Power left: 2/5    Graphical documentation           Precautions: 10 weeks post partum; breastfeeding/pumping  Daily Treatment Diary     Manual  2/20                                                                                 Exercise Diary  2/20            PFMC: quick flicks 7"WJJFL/3"YNSZ             PFMC: slow holds 5"work/10"rest             PFMC: elevators TA ADIM             TA ADIM + PFMC             TA ADIM + PFMC + hip add isometrics             TA ADIM + PFMC + hip abd isometrics             PFMC + bridge             clamshells             TA + PFMC in quadruped             Seated PFMC             Pball seated PFMC             PFMC with sit to stand             PFMC in standing             PFMC + squat                                                                                                            Modalities

## 2020-02-26 ENCOUNTER — SOCIAL WORK (OUTPATIENT)
Dept: BEHAVIORAL/MENTAL HEALTH CLINIC | Facility: CLINIC | Age: 40
End: 2020-02-26
Payer: COMMERCIAL

## 2020-02-26 DIAGNOSIS — F41.8 POSTPARTUM ANXIETY: Primary | ICD-10-CM

## 2020-02-26 PROCEDURE — 1036F TOBACCO NON-USER: CPT | Performed by: SOCIAL WORKER

## 2020-02-26 PROCEDURE — 90834 PSYTX W PT 45 MINUTES: CPT | Performed by: SOCIAL WORKER

## 2020-02-26 NOTE — PSYCH
Assessment/Plan:      Diagnoses and all orders for this visit:    Postpartum anxiety          Subjective:     Patient ID: Augie Stanley is a 44 y o  female  Janet Macias was counseled for 50 minutes from 10:00 - 10:50am    Clemencia Shock born on 12/6 - seen for anxiety/feeling overwhelmed   off until April  Pumping and on breast once per day  Feels baby changing everyday which causes anxiety - "how will I know the right thing to do?"  Increased food that increase lactation - getting one more bottle of breastmilk  Spitting up more breastmilk vs formula  Peds sending to orthopediic surgeon - one leg longer than other - not getting very anxious about at this time  CBT - anxiety management - calming vs troublesome thought techniques  Self Care, asking for help and looking at strengths/accomplishments  HPI    Review of Systems      Objective:     Physical Exam  oriented, engaged, calm/sad, full range affect, good eye contact, appropriate speech, denies suicidal/homicidal ideations/psychosis/desire to harm baby, fair insight/good judgement     Baby present - positive interaction

## 2020-02-27 ENCOUNTER — OFFICE VISIT (OUTPATIENT)
Dept: PHYSICAL THERAPY | Facility: REHABILITATION | Age: 40
End: 2020-02-27
Payer: COMMERCIAL

## 2020-02-27 DIAGNOSIS — N81.89 PELVIC FLOOR WEAKNESS: ICD-10-CM

## 2020-02-27 DIAGNOSIS — N39.3 STRESS INCONTINENCE: ICD-10-CM

## 2020-02-27 DIAGNOSIS — R39.15 URINARY URGENCY: ICD-10-CM

## 2020-02-27 PROCEDURE — 97112 NEUROMUSCULAR REEDUCATION: CPT | Performed by: PHYSICAL THERAPIST

## 2020-02-27 PROCEDURE — 97530 THERAPEUTIC ACTIVITIES: CPT | Performed by: PHYSICAL THERAPIST

## 2020-02-27 PROCEDURE — 90912 BFB TRAINING 1ST 15 MIN: CPT | Performed by: PHYSICAL THERAPIST

## 2020-03-03 NOTE — PATIENT INSTRUCTIONS
Follow up in 2 weeks - recommended Baby & Me Support Groups, lactation consultant if desires help with increased supply

## 2020-03-05 ENCOUNTER — OFFICE VISIT (OUTPATIENT)
Dept: PHYSICAL THERAPY | Facility: REHABILITATION | Age: 40
End: 2020-03-05
Payer: COMMERCIAL

## 2020-03-05 DIAGNOSIS — R39.15 URINARY URGENCY: ICD-10-CM

## 2020-03-05 DIAGNOSIS — N39.3 STRESS INCONTINENCE: ICD-10-CM

## 2020-03-05 DIAGNOSIS — N81.89 PELVIC FLOOR WEAKNESS: ICD-10-CM

## 2020-03-05 PROCEDURE — 97110 THERAPEUTIC EXERCISES: CPT | Performed by: PHYSICAL THERAPIST

## 2020-03-05 PROCEDURE — 97530 THERAPEUTIC ACTIVITIES: CPT | Performed by: PHYSICAL THERAPIST

## 2020-03-05 PROCEDURE — 97112 NEUROMUSCULAR REEDUCATION: CPT | Performed by: PHYSICAL THERAPIST

## 2020-03-05 NOTE — PROGRESS NOTES
Daily Note     Today's date: 3/5/2020  Patient name: Jordyn Mendez  : 1980  MRN: [id number]  Referring provider: Payal Osman MD  Dx:   Encounter Diagnosis     ICD-10-CM    1  Encounter for postpartum visit Z39 2    2  Stress incontinence N39 3    3  Urinary urgency R39 15    4  Pelvic floor weakness N81 89        Start Time: 1700  Stop Time: 1800  Total time in clinic (min): 60 minutes    Subjective: The patient reports that she has been partially compliant with her home exercise program  She has been engaging her pelvic floor muscles with squats and limited her range of motion and this helped her to avoid any leakage  She reports that she experiences leakage of urine when she gets up off the floor from a seated, cross legged position  Objective: See treatment diary below      Assessment: Tolerated treatment well  Patient did well with exercises performed today  She required some cueing to slow down and to fully release her pelvic floor muscles prior to moving on to the next repetition  She was able to contract her pelvic floor muscles prior to standing up from sitting on the floor and prevent leakage of urine  No leakage with exercises today  Continue to progress and monitor progression  Updated HEP today  Plan: Continue per plan of care        Precautions: 11 weeks post partum; breastfeeding/pumping  Daily Treatment Diary   Mary Corley HEP: [id number]    Manual  2/20 2/27 3/5                                                                               Exercise Diary  2/20 2/27 3/5          PFMC: quick flicks 6"CWXAR/1"EQRU  10x           PFMC: slow holds 5"work/10"rest  10x           PFMC: elevators             TA ADIM  5"x10           TA ADIM + PFMC  5"x5           TA ADIM + PFMC + hip add isometrics  10x           TA ADIM + PFMC + hip abd isometrics  10x           PFMC + bridge  eccentric  10x  +OTB  2x10          Bridges with marching   10x          clamshells   15x OTB sideplank clamshells   10x          TA + PFMC in quadruped             Seated PFMC             Pball seated PFMC             PFMC with sit to stand   From floor  10x          PFMC in standing  10x   QF's 10x  QF's          PFMC + squat  10x   Small range 6x  6x 10#          PFMC + reaching into cabinet  3#  5x   First shelf           PFMC + picking object off stool  3#  5x           PFMC in deep squat   10x          PFMC with stand from deep squat   10#  8x          PFMC + object lift from counter   10#  10x          theraband rows + TA + PFMC   GTB  15x          Paloff press + TA + PFMC   10x  GTB              Modalities

## 2020-03-10 ENCOUNTER — OFFICE VISIT (OUTPATIENT)
Dept: PHYSICAL THERAPY | Facility: REHABILITATION | Age: 40
End: 2020-03-10
Payer: COMMERCIAL

## 2020-03-10 DIAGNOSIS — N39.3 STRESS INCONTINENCE: ICD-10-CM

## 2020-03-10 DIAGNOSIS — N81.89 PELVIC FLOOR WEAKNESS: ICD-10-CM

## 2020-03-10 DIAGNOSIS — R39.15 URINARY URGENCY: ICD-10-CM

## 2020-03-10 PROCEDURE — 97112 NEUROMUSCULAR REEDUCATION: CPT | Performed by: PHYSICAL THERAPIST

## 2020-03-10 PROCEDURE — 97110 THERAPEUTIC EXERCISES: CPT | Performed by: PHYSICAL THERAPIST

## 2020-03-10 PROCEDURE — 97530 THERAPEUTIC ACTIVITIES: CPT | Performed by: PHYSICAL THERAPIST

## 2020-03-10 NOTE — PROGRESS NOTES
Daily Note     Today's date: 3/10/2020  Patient name: Conrad Steen  : 1980  MRN: [id number]  Referring provider: Pepe Tabares MD  Dx:   Encounter Diagnosis     ICD-10-CM    1  Encounter for postpartum visit Z39 2    2  Stress incontinence N39 3    3  Urinary urgency R39 15    4  Pelvic floor weakness N81 89        Start Time:   Stop Time: 47  Total time in clinic (min): 50 minutes    Subjective: The patient notes that she has been practicing her pelvic floor engagement prior to getting up off the floor and she has had no leakage with this movement  She has been also taking it slower with transitioning and moving  She also had some leakage with a sneeze  She sneezes multiple times in a row  Objective: See treatment diary below      Assessment: Tolerated treatment well  Patient demonstrated some instability in her pelvis with bird dogs (extension based) and also reported challenge with maintaining pelvic floor contraction with this   She did require some cueing for form with exercises and to slow down for better control of her core  She was able to squat while holding weight today with good form and no leakage  Plan: Continue per plan of care        Precautions: 12 weeks post partum; breastfeeding/pumping  Daily Treatment Diary   Trudi Slipper HEP: [id number]    Manual  2/20 2/27 3/5 3/10                                                                              Exercise Diary  2/20 2/27 3/5 3/10         PFMC: quick flicks 8"CVOCH/0"SQLH  10x           PFMC: slow holds 5"work/10"rest  10x           PFMC: elevators             TA ADIM  5"x10           TA ADIM + PFMC  5"x5           TA ADIM + PFMC + hip add isometrics  10x           TA ADIM + PFMC + hip abd isometrics  10x           PFMC + bridge  eccentric  10x  +OTB  2x10 +OTB  2x10         Bridges with marching   10x          clamshells   15x OTB 2x10  OTB         sideplank clamshells   10x 2x10         TA + PFMC in quadruped Seated PFMC             Pball seated PFMC             PFMC with sit to stand   From floor  10x          PFMC in standing  10x   QF's 10x  QF's          PFMC + squat  10x   Small range 6x  6x 10# 10#  10x         PFMC + reaching into cabinet  3#  5x   First shelf           PFMC + picking object off stool  3#  5x           PFMC in deep squat   10x          PFMC with stand from deep squat   10#  8x 10#  8x         PFMC + object lift from counter   10#  10x 10#  10x         theraband rows + TA + PFMC   GTB  15x GTB  3x10         Paloff press + TA + PFMC   10x  GTB 15x GTB         Bird dogs + TA + PFMC    10x                                       Modalities

## 2020-03-12 ENCOUNTER — APPOINTMENT (OUTPATIENT)
Dept: PHYSICAL THERAPY | Facility: REHABILITATION | Age: 40
End: 2020-03-12
Payer: COMMERCIAL

## 2020-03-16 ENCOUNTER — OFFICE VISIT (OUTPATIENT)
Dept: PHYSICAL THERAPY | Facility: REHABILITATION | Age: 40
End: 2020-03-16
Payer: COMMERCIAL

## 2020-03-16 DIAGNOSIS — N81.89 PELVIC FLOOR WEAKNESS: ICD-10-CM

## 2020-03-16 DIAGNOSIS — R39.15 URINARY URGENCY: ICD-10-CM

## 2020-03-16 DIAGNOSIS — N39.3 STRESS INCONTINENCE: ICD-10-CM

## 2020-03-16 PROCEDURE — 97530 THERAPEUTIC ACTIVITIES: CPT | Performed by: PHYSICAL THERAPIST

## 2020-03-16 PROCEDURE — 97112 NEUROMUSCULAR REEDUCATION: CPT | Performed by: PHYSICAL THERAPIST

## 2020-03-16 PROCEDURE — 97110 THERAPEUTIC EXERCISES: CPT | Performed by: PHYSICAL THERAPIST

## 2020-03-16 NOTE — PROGRESS NOTES
Daily Note     Today's date: 3/16/2020  Patient name: Carina Maldonado  : 1980  MRN: [id number]  Referring provider: Duncan Dumont MD  Dx:   Encounter Diagnosis     ICD-10-CM    1  Encounter for postpartum visit Z39 2    2  Stress incontinence N39 3    3  Urinary urgency R39 15    4  Pelvic floor weakness N81 89        Start Time: 1400  Stop Time: 1500  Total time in clinic (min): 60 minutes    Subjective: The patient notes that last week when she worked out she attempted to perform jumping jacks but it did not go well  She did not leak but was only able to 3 of them and she felt that she would have leaked  She did go snowboarding this past weekend and did not experience any leakage with this  Objective: See treatment diary below      Assessment: Tolerated treatment well  Patient reported feeling challenge with balance and control with core exercises performed today  Progressed as able  She is able to perform a deeper squat with control without leakage  She does seem to have more instability with load transfer to the right, and this is her dominant side with exercises  She had no reports of leakage during treatment today  She is progressing well  Plan: Continue per plan of care        Precautions: 13 weeks post partum; breastfeeding/pumping  Daily Treatment Diary   Acacia Glez HEP: [id number]    Manual  2/20 2/27 3/5 3/10 3/16                                                                             Exercise Diary  2/20 2/27 3/5 3/10 3/16        PFMC: quick flicks 3"DUJAL/2"GYPV  10x           PFMC: slow holds 5"work/10"rest  10x           PFMC: elevators             TA ADIM  5"x10           TA ADIM + PFMC  5"x5           TA ADIM + PFMC + hip add isometrics  10x           TA ADIM + PFMC + hip abd isometrics  10x           PFMC + bridge  eccentric  10x  +OTB  2x10 +OTB  2x10 +GTB  3x10        Bridges with marching   10x  10x        clamshells   15x OTB 2x10  OTB         sideplank clamshells   10x 2x10 3x10        TA + PFMC in quadruped             Seated PFMC             Pball seated [address]             PFMC with sit to stand   From floor  10x          PFMC in standing  10x   QF's 10x  QF's          PFMC + squat  10x   Small range 6x  6x 10# 10#  10x         PFMC + reaching into cabinet  3#  5x   First shelf           PFMC + picking object off stool  3#  5x           PFMC in deep squat   10x          PFMC with stand from deep squat   10#  8x 10#  8x         PFMC with deep squat     10x        PFMC with weighted squat     15#  10x        PFMC + Bosu Squat     10x        PFMC + object lift from counter   10#  10x 10#  10x         theraband rows + TA + PFMC   GTB  15x GTB  3x10         Paloff press + TA + PFMC   10x  GTB 15x GTB 2x10  BTB        Bird dogs + TA + PFMC    10x 10x no resist   10x ea with GTB        PFMC + lateral lunges     8#  10x ea        PFMC + half kneel to stand     8x ea  12#            Modalities

## 2020-03-23 ENCOUNTER — APPOINTMENT (OUTPATIENT)
Dept: PHYSICAL THERAPY | Facility: REHABILITATION | Age: 40
End: 2020-03-23
Payer: COMMERCIAL

## 2020-03-26 ENCOUNTER — APPOINTMENT (OUTPATIENT)
Dept: PHYSICAL THERAPY | Facility: REHABILITATION | Age: 40
End: 2020-03-26
Payer: COMMERCIAL

## 2020-03-27 ENCOUNTER — SOCIAL WORK (OUTPATIENT)
Dept: BEHAVIORAL/MENTAL HEALTH CLINIC | Facility: CLINIC | Age: 40
End: 2020-03-27
Payer: COMMERCIAL

## 2020-03-27 DIAGNOSIS — F41.8 POSTPARTUM ANXIETY: Primary | ICD-10-CM

## 2020-03-27 PROCEDURE — 90834 PSYTX W PT 45 MINUTES: CPT | Performed by: SOCIAL WORKER

## 2020-03-27 PROCEDURE — 1036F TOBACCO NON-USER: CPT | Performed by: SOCIAL WORKER

## 2020-03-27 NOTE — PSYCH
Virtual Regular Visit    Problem List Items Addressed This Visit     None      Visit Diagnoses     Postpartum anxiety    -  Primary               Reason for visit is follow up to postpartum anxiety  Seen by telehealth due to Coronavirus  Encounter provider Maeve Rodriguez    Provider located at [address] [address] [phone]      Recent Visits  No visits were found meeting these conditions  Showing recent visits within past 7 days and meeting all other requirements     Future Appointments  No visits were found meeting these conditions  Showing future appointments within next 150 days and meeting all other requirements        After connecting through [employer], the patient was identified by name and date of birth  Rebekah Guerra was informed that this is a telemedicine visit and that the visit is being conducted through [address] and patient was informed that this is not a secure, HIPAA-complaint platform  she agrees to proceed  which may not be secure and therefore, might not be HIPAA-compliant  My office door was closed  No one else was in the room  She acknowledged consent and understanding of privacy and security of the video platform  The patient has agreed to participate and understands they can discontinue the visit at any time  Subjective  Rebekah Guerra is a 44 y o  female  Patient is still on maternity leave and campus is going to all online through summer   also home - working from home now which is helpful  Glad to isolate out of anxiety of baby getting Coronavirus  Feels was already socially isolating due to Hannah's immunity/being a   Lucius Jacob breastfeeding during visit and cooing  Friends/family reaching out ot her more and she's making an effort as well  [address] people    Went to Orthopedic appt for Hannah's leg being a little short - doesn't feel an ongoing issue - follow up at six months and felt went well - relieved  Damien Hernandez has acid reflux so patient now limiting all dairy to see if allergin  Spitting up - doesn't seem in pain  Still gaining weight  Only upset by it a few times but otherwise seems comfortable - still gaining weight  Had [address] on Monday and healthy and gaining  Weighs 13lbs! Above growth chart  Discussed anxiety management, coping skills, online contact with others  Trying to feed by breast every other time vs pumping as much - trying to catch up with demand but cutting done on pumping/cleaning/double time it takes  Feels no sores/breast symptoms - latch improved and not painful  Goal continues to be six months - very curious about real food  Trying to find balance of getting a little time alone - discussed benefits of doing so  Explored continued anxiety about "doing the right thing" in childrearing - being comfortable not knowing what's right - explored perhaps more than one right way - Ex - discussion related to teething  Thomasenia Fraction is communicating/"talking "    Did cancel [place] vacation to avoid flying and went to [facility] with family - easy/laid back and actually fun - realized was successful even with pumping, etc - made it through in strange location, Damien Hernandez slept ok - CBT related to managing anxiety, fears before something happens  Biggest anxiety will be return to work - from home/enough time  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  Started talking about 2nd kid since older - "Do we try?"  Feel pressure due to age but will decide by December  Feels getting more confident with Damien Hernandez and worries about that happening with 2nd child  Self esteem, changing negative thoughts about herself, 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, 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, laughing, etc    Worries about lack of allowing her to figure stuff out or help her - then over thinks     Mindfullness and being more present  Due to being goal oriented, will set one goal per day for [facility] and the rest is just doing what she's doing and letting go of unrealistic expectations        Past Medical History:   Diagnosis Date    Abnormal Pap smear of cervix     Anxiety     HPV (human papilloma virus) infection     Vaginal delivery 12/2019    Varicella     had one pox, unsure if immuned       Past Surgical History:   Procedure Laterality Date    CERVICAL BIOPSY  W/ LOOP ELECTRODE EXCISION      2015    TONSILLECTOMY      WISDOM TOOTH EXTRACTION         Current Outpatient Medications   Medication Sig Dispense Refill    docusate sodium (COLACE) 100 mg capsule Take 1 capsule (100 mg total) by mouth 2 (two) times a day as needed for constipation (Patient not taking: Reported on 2/20/2020) 10 capsule 0    famotidine (PEPCID) 20 mg tablet Take 1 tablet (20 mg total) by mouth 2 (two) times a day 180 tablet 0    fexofenadine (ALLEGRA) 180 MG tablet Take 1 tablet (180 mg total) by mouth daily      fluticasone (FLONASE) 50 mcg/act nasal spray 2 sprays into each nostril daily As needed for allergies/congestion 1 Bottle 3    Iron Combinations (IRON COMPLEX PO) Take 1 capsule by mouth daily       norethindrone-ethinyl estradiol-ferrous fumarate (LOESTIN 24 FE) 1-20 MG-MCG(24) per tablet Take 1 tablet by mouth daily (Patient not taking: Reported on 1/3/2020) 84 tablet 1    Prenatal MV & Min w/FA-DHA (PRENATAL ADULT GUMMY/DHA/FA) 0 4-25 MG CHEW Chew 2 tablets daily at bedtime       No current facility-administered medications for this visit  Allergies   Allergen Reactions    Mold Extract [Trichophyton] Sneezing    Other GI Intolerance and Vomiting     Watermellon    Pollen Extract Sneezing       Review of Systems      I spent 30 minutes with the patient during this visit      MSE on telehealth - oriented, engaged, appropriate speech, calm/happy, denies suicidal/homicidal ideations/psychosis/desire to harm baby, fair to good insight/good judgement

## 2020-04-30 ENCOUNTER — TELEMEDICINE (OUTPATIENT)
Dept: BEHAVIORAL/MENTAL HEALTH CLINIC | Facility: CLINIC | Age: 40
End: 2020-04-30
Payer: COMMERCIAL

## 2020-04-30 DIAGNOSIS — F41.8 POSTPARTUM ANXIETY: Primary | ICD-10-CM

## 2020-04-30 PROCEDURE — 90834 PSYTX W PT 45 MINUTES: CPT | Performed by: SOCIAL WORKER

## 2020-05-01 ENCOUNTER — TELEMEDICINE (OUTPATIENT)
Dept: FAMILY MEDICINE CLINIC | Facility: CLINIC | Age: 40
End: 2020-05-01
Payer: COMMERCIAL

## 2020-05-01 VITALS — WEIGHT: 150 LBS | TEMPERATURE: 97.4 F | BODY MASS INDEX: 24.11 KG/M2 | HEIGHT: 66 IN

## 2020-05-01 DIAGNOSIS — R19.7 DIARRHEA, UNSPECIFIED TYPE: ICD-10-CM

## 2020-05-01 DIAGNOSIS — R42 VERTIGO: Primary | ICD-10-CM

## 2020-05-01 PROCEDURE — 99213 OFFICE O/P EST LOW 20 MIN: CPT | Performed by: FAMILY MEDICINE

## 2020-05-01 RX ORDER — MECLIZINE HYDROCHLORIDE 25 MG/1
25 TABLET ORAL EVERY 8 HOURS SCHEDULED
Qty: 30 TABLET | Refills: 0 | Status: SHIPPED | OUTPATIENT
Start: 2020-05-01

## 2020-05-28 ENCOUNTER — TELEMEDICINE (OUTPATIENT)
Dept: BEHAVIORAL/MENTAL HEALTH CLINIC | Facility: CLINIC | Age: 40
End: 2020-05-28
Payer: COMMERCIAL

## 2020-05-28 DIAGNOSIS — F41.8 POSTPARTUM ANXIETY: Primary | ICD-10-CM

## 2020-05-28 PROCEDURE — 90834 PSYTX W PT 45 MINUTES: CPT | Performed by: SOCIAL WORKER

## 2020-06-03 ENCOUNTER — TELEPHONE (OUTPATIENT)
Dept: OBGYN CLINIC | Facility: CLINIC | Age: 40
End: 2020-06-03

## 2020-06-04 ENCOUNTER — TELEPHONE (OUTPATIENT)
Dept: OTHER | Facility: OTHER | Age: 40
End: 2020-06-04

## 2020-06-04 ENCOUNTER — ANNUAL EXAM (OUTPATIENT)
Dept: OBGYN CLINIC | Facility: CLINIC | Age: 40
End: 2020-06-04
Payer: COMMERCIAL

## 2020-06-04 VITALS
DIASTOLIC BLOOD PRESSURE: 66 MMHG | WEIGHT: 145 LBS | TEMPERATURE: 99.5 F | SYSTOLIC BLOOD PRESSURE: 124 MMHG | BODY MASS INDEX: 23.4 KG/M2

## 2020-06-04 DIAGNOSIS — Z01.419 WOMEN'S ANNUAL ROUTINE GYNECOLOGICAL EXAMINATION: Primary | ICD-10-CM

## 2020-06-04 DIAGNOSIS — R10.2 PERINEAL PAIN: ICD-10-CM

## 2020-06-04 PROCEDURE — 99395 PREV VISIT EST AGE 18-39: CPT | Performed by: OBSTETRICS & GYNECOLOGY

## 2020-06-22 ENCOUNTER — TELEMEDICINE (OUTPATIENT)
Dept: BEHAVIORAL/MENTAL HEALTH CLINIC | Facility: CLINIC | Age: 40
End: 2020-06-22
Payer: COMMERCIAL

## 2020-06-22 DIAGNOSIS — F41.8 POSTPARTUM ANXIETY: Primary | ICD-10-CM

## 2020-06-22 PROCEDURE — 90834 PSYTX W PT 45 MINUTES: CPT | Performed by: SOCIAL WORKER

## 2020-06-22 NOTE — PROGRESS NOTES
PT Discharge    Today's date: 2020  Patient name: Shu López  : 1980  MRN: [id number]  Referring provider: Keyon Berger MD  Dx:   Encounter Diagnosis     ICD-10-CM    1  Encounter for postpartum visit Z39 2    2  Stress incontinence N39 3    3  Urinary urgency R39 15    4  Pelvic floor weakness N81 89        Start Time: 1400  Stop Time: 1500  Total time in clinic (min): 60 minutes    Assessment/Plan: The patient has been treated for a total of 5 visits including  her IE on   She self-discharged due to COVID-19  Contacted the patient with the potential for virtual visits to continue care and to check in but did not hear back  She will be discharged from outpatient PT at this time       Pelvic Floor Subjective     Objective

## 2020-07-28 ENCOUNTER — TELEMEDICINE (OUTPATIENT)
Dept: BEHAVIORAL/MENTAL HEALTH CLINIC | Facility: CLINIC | Age: 40
End: 2020-07-28
Payer: COMMERCIAL

## 2020-07-28 DIAGNOSIS — F41.8 POSTPARTUM ANXIETY: Primary | ICD-10-CM

## 2020-07-28 PROCEDURE — 90834 PSYTX W PT 45 MINUTES: CPT | Performed by: SOCIAL WORKER

## 2020-07-28 NOTE — PSYCH
Virtual Brief Visit    Assessment/Plan:    Problem List Items Addressed This Visit     None      Visit Diagnoses     Postpartum anxiety    -  Primary                Reason for visit is   Chief Complaint   Patient presents with    Virtual Brief Visit        Encounter provider Jermaine Velasquez    Provider located at [address] [place] [address] D[place] Parham [address] [phone]    Recent Visits  No visits were found meeting these conditions  Showing recent visits within past 7 days and meeting all other requirements     Today's Visits  Date Type Provider Dept   07/28/20 Telemedicine Jermaine Velasquez [facility] Navdeep Ernandez   Showing today's visits and meeting all other requirements     Future Appointments  No visits were found meeting these conditions  Showing future appointments within next 150 days and meeting all other requirements        After connecting through [employer] and patient was informed that this is not a secure, HIPAA-complaint platform  She agrees to proceed  , the patient was identified by name and date of birth  Luc Alfaro was informed that this is a telemedicine visit and that the visit is being conducted through [address] and patient was informed that this is not a secure, HIPAA-complaint platform  She agrees to proceed     My office door was closed  No one else was in the room  She acknowledged consent and understanding of privacy and security of the platform  The patient has agreed to participate and understands she can discontinue the visit at any time  Patient is aware this is a billable service  Subjective    Luc Alfaro is a 44 y o  female was counseled for 45 minutes from 10:15 - 11:00am - telehealth due to covid - prefers telephone  Feels more paranoid about Covid - [place] getting sick due to research coming out about long term Covid effects    Feels 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  Went to [facility] group for first time this week and desired connection - older woman had stroke at group last week when patient not there  Entire time this week, fears about contact/being near them, sanitizing, etc    Feels paranoid  Deciding no day care for Fall at all - notified day care - feels will wait until Tima Kevin is one - 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  Anxious about full time work and caring for Tima Kevin  7 5 months old now and constantly wants attention/be picked up - feels can't get things done  No live/online but grade/points/etc   Talked to Richelle San - brother's wife who stays at home but has no kids  Not interested in having kids - not a lot of experience with kids - loves playing with Tima Kevin though  One day per week but never changed a diaper - large learning curve  Roleplayed ways to communicate needs/keep things in perspective  Pumping twice a day - tries to breastfeed at night but Tima Kevin often not interested  Still guilt about breastfeeding not working out  Went to [facility] to see family - great trip there - way back more difficult - fearful of rest stops/germs  Great trip, fun, relaxing - felt family careful and confident  Anxiety management, self care, communicating needs, CBT - thought management reviewed      HPI     Past Medical History:   Diagnosis Date    Abnormal Pap smear of cervix     Anxiety     HPV (human papilloma virus) infection     Vaginal delivery 12/2019    Varicella     had one pox, unsure if immuned       Past Surgical History:   Procedure Laterality Date    CERVICAL BIOPSY  W/ LOOP ELECTRODE EXCISION      2015    TONSILLECTOMY      WISDOM TOOTH EXTRACTION         Current Outpatient Medications   Medication Sig Dispense Refill    conjugated estrogens (PREMARIN) vaginal cream Insert 0 5 g into the vagina daily 30 g 1    fexofenadine (ALLEGRA) 180 MG tablet Take 1 tablet (180 mg total) by mouth daily (Patient taking differently: Take 180 mg by mouth daily as needed )      fluticasone (FLONASE) 50 mcg/act nasal spray 2 sprays into each nostril daily As needed for allergies/congestion 1 Bottle 3    meclizine (ANTIVERT) 25 mg tablet Take 1 tablet (25 mg total) by mouth every 8 (eight) hours 30 tablet 0    Prenatal MV & Min w/FA-DHA (PRENATAL ADULT GUMMY/DHA/FA) 0 4-25 MG CHEW Chew 2 tablets daily at bedtime       No current facility-administered medications for this visit  Allergies   Allergen Reactions    Mold Extract [Trichophyton] Sneezing    Other GI Intolerance and Vomiting     Watermellon    Pollen Extract Sneezing       Review of Systems    There were no vitals filed for this visit  I spent 45 minutes directly with the patient during this visit     Oriented, engaged, anxious, full range affect, appropriate speech, denies suicidal/homicidal ideations/psychosis/desire to harm baby, good insight/judgement    VIRTUAL VISIT DISCLAIMER    Arya Liu acknowledges that she has consented to an online visit or consultation  She understands that the online visit is based solely on information provided by her, and that, in the absence of a face-to-face physical evaluation by the physician, the diagnosis she receives is both limited and provisional in terms of accuracy and completeness  This is not intended to replace a full medical face-to-face evaluation by the physician  Arya Liu understands and accepts these terms

## 2020-08-07 ENCOUNTER — OFFICE VISIT (OUTPATIENT)
Dept: FAMILY MEDICINE CLINIC | Facility: CLINIC | Age: 40
End: 2020-08-07
Payer: COMMERCIAL

## 2020-08-07 VITALS
DIASTOLIC BLOOD PRESSURE: 72 MMHG | BODY MASS INDEX: 22.98 KG/M2 | HEIGHT: 66 IN | RESPIRATION RATE: 16 BRPM | WEIGHT: 143 LBS | SYSTOLIC BLOOD PRESSURE: 104 MMHG | TEMPERATURE: 98.9 F | HEART RATE: 85 BPM | OXYGEN SATURATION: 98 %

## 2020-08-07 DIAGNOSIS — H93.8X1 EAR FULLNESS, RIGHT: ICD-10-CM

## 2020-08-07 DIAGNOSIS — N64.4 MASTALGIA: Primary | ICD-10-CM

## 2020-08-07 PROCEDURE — 99213 OFFICE O/P EST LOW 20 MIN: CPT | Performed by: FAMILY MEDICINE

## 2020-08-07 PROCEDURE — 1036F TOBACCO NON-USER: CPT | Performed by: FAMILY MEDICINE

## 2020-08-07 RX ORDER — CEPHALEXIN 500 MG/1
500 CAPSULE ORAL EVERY 8 HOURS SCHEDULED
Qty: 21 CAPSULE | Refills: 0 | Status: SHIPPED | OUTPATIENT
Start: 2020-08-07 | End: 2020-08-14

## 2020-08-11 NOTE — PROGRESS NOTES
Assessment/Plan:    No problem-specific Assessment & Plan notes found for this encounter  Diagnoses and all orders for this visit:    Mastalgia  Comments:  +cellulitis starting   keflex TID x 7 days  call if symptoms don't resolve    Orders:  -     cephalexin (KEFLEX) 500 mg capsule; Take 1 capsule (500 mg total) by mouth every 8 (eight) hours for 7 days    Ear fullness, right  Comments:  refer to ent  Orders:  -     Ambulatory Referral to Otolaryngology; Future        Subjective:      Patient ID: Susan Jovel is a 44 y o  female  HPI  Pt presents c/o R nipple pain since being bitten by her daughter while nursing a week ago  Area has become cracked, red, painful  Has used lanolin with no relief  No fevers  No pain in breast other than over areas that are open  Doesn't feel like mastitis    Pt also c/o R ear full sensation  No pain  Has been going on for months  Using flonase and allergy meds, but pain persists  ?s hearing loss  The following portions of the patient's history were reviewed and updated as appropriate: allergies, current medications, past family history, past medical history, past social history, past surgical history and problem list     Review of Systems    See hpi    Objective:      /72   Pulse 85   Temp 98 9 °F (37 2 °C) (Tympanic)   Resp 16   Ht 5' 6" (1 676 m)   Wt 64 9 kg (143 lb)   SpO2 98%   BMI 23 08 kg/m²          Physical Exam  Constitutional:       Appearance: Normal appearance  HENT:      Head: Normocephalic and atraumatic  Right Ear: Tympanic membrane, ear canal and external ear normal       Left Ear: Tympanic membrane, ear canal and external ear normal       Mouth/Throat:      Mouth: Mucous membranes are moist       Pharynx: No oropharyngeal exudate or posterior oropharyngeal erythema  Eyes:      Extraocular Movements: Extraocular movements intact        Conjunctiva/sclera: Conjunctivae normal       Pupils: Pupils are equal, round, and reactive to light  Neck:      Musculoskeletal: Normal range of motion  Cardiovascular:      Rate and Rhythm: Normal rate and regular rhythm  Heart sounds: Normal heart sounds  No murmur  No friction rub  No gallop  Pulmonary:      Effort: Pulmonary effort is normal       Breath sounds: Normal breath sounds  No wheezing, rhonchi or rales  Chest:      Comments: R nipple with cracks/erythema  No d/c  No induration  Lymphadenopathy:      Cervical: No cervical adenopathy  Neurological:      Mental Status: She is alert  Cranial Nerves: No cranial nerve deficit

## 2020-08-31 ENCOUNTER — TELEMEDICINE (OUTPATIENT)
Dept: BEHAVIORAL/MENTAL HEALTH CLINIC | Facility: CLINIC | Age: 40
End: 2020-08-31
Payer: COMMERCIAL

## 2020-08-31 DIAGNOSIS — F41.8 POSTPARTUM ANXIETY: Primary | ICD-10-CM

## 2020-08-31 PROCEDURE — 90834 PSYTX W PT 45 MINUTES: CPT | Performed by: SOCIAL WORKER

## 2020-08-31 NOTE — PSYCH
Virtual Brief Visit    Assessment/Plan:    Problem List Items Addressed This Visit     None      Visit Diagnoses     Postpartum anxiety    -  Primary                Reason for visit is   Chief Complaint   Patient presents with    Virtual Brief Visit        Encounter provider Sonja Hitchcock    Provider located at [address] [facility] [address]    Recent Visits  No visits were found meeting these conditions  Showing recent visits within past 7 days and meeting all other requirements     Today's Visits  Date Type Provider Dept   08/31/20 Telemedicine Sonja Hitchcock [facility]   Showing today's visits and meeting all other requirements     Future Appointments  No visits were found meeting these conditions  Showing future appointments within next 150 days and meeting all other requirements        After connecting through [email or web address] and patient was informed that this is not a secure, HIPAA-complaint platform  She agrees to proceed  , the patient was identified by name and date of birth  Claudean Knack was informed that this is a telemedicine visit and that the visit is being conducted through [employer] [facility] and patient was informed that this is not a secure, HIPAA-complaint platform  She agrees to proceed     My office door was closed  No one else was in the room  She acknowledged consent and understanding of privacy and security of the platform  The patient has agreed to participate and understands she can discontinue the visit at any time  Patient is aware this is a billable service  Subjective    Claudean Knack is a 44 y o  female was counseled for 45 minutes from 11:00 - 11:45am - telehealth due to covid - requested telephone vs video  Started first day with  leaving home for work vs being at home     Patient doing all online school now - no live classes but has some anxiety about managing with Sera Flores is 8 5 months  Abrupt hearing/dizziness - think Meinere's Disease so on prednisone which increased anxiety  Mom has as well so keeping in perspective but hoping hearing loss is not permanent and doesn't get worse  Hoping to get hearing back fully - feels to maintain - low sodium diet now and feels stressed about how to manage  Came up with plan about  helping her identify anxiety early and talk through  Issues she felt better back have resurfaced a bit  Stopped pumping this past week - Hannah bit her last month - had sore and felt did not heal well - low production then  Past few days, thinking "should I pump?"  Plan is to stop all breastmilk now - feels doing a lot of work for very little milk - 10z in almost an hour, cracking nipple  Worries about a lot - feels a more quiet person and wonders if she's speaking enough to Marcela  Feels developmentally on target - cooing, laughing, making sounds and 97% for height - feels doing great physically and developmentally  Reading multiple times a day  Explored personalities traits and accepting who she is - focus on the things she's doing well vs feels she is lacking  Arleen Chatters came over to practice nanny and Marcela fell and hit nose on drum - patient telling jarrell, "It happens, no big deal   It will happen, no one's fault "  Started sleep training - comforting but having [place] stay in crib a little more, smaller comfort feeds when only wanting comfort vs really eating  Not using birth control now but not measuring temps/looking at calendar - hopes to get pregnant this yr  Turing 40 on Thursday  Realizes relieving stress from life with work position helped her get pregnant with Marcela so looking at ways to de stress and stay as stress free for self and pregnancy  Discussed coping skills, staying present, getting out for self care, parenting and managing anxiety, CBT - thoughts impacting      HPI     Past Medical History:   Diagnosis Date    Abnormal Pap smear of cervix     Anxiety     HPV (human papilloma virus) infection     Vaginal delivery 12/2019    Varicella     had one pox, unsure if immuned       Past Surgical History:   Procedure Laterality Date    CERVICAL BIOPSY  W/ LOOP ELECTRODE EXCISION      2015    TONSILLECTOMY      WISDOM TOOTH EXTRACTION         Current Outpatient Medications   Medication Sig Dispense Refill    conjugated estrogens (PREMARIN) vaginal cream Insert 0 5 g into the vagina daily 30 g 1    fexofenadine (ALLEGRA) 180 MG tablet Take 1 tablet (180 mg total) by mouth daily (Patient taking differently: Take 180 mg by mouth daily as needed )      fluticasone (FLONASE) 50 mcg/act nasal spray 2 sprays into each nostril daily As needed for allergies/congestion 1 Bottle 3    meclizine (ANTIVERT) 25 mg tablet Take 1 tablet (25 mg total) by mouth every 8 (eight) hours 30 tablet 0    predniSONE 20 mg tablet Take 3 tablets (60 mg total) by mouth daily for 14 days 42 tablet 0    Prenatal MV & Min w/FA-DHA (PRENATAL ADULT GUMMY/DHA/FA) 0 4-25 MG CHEW Chew 2 tablets daily at bedtime       No current facility-administered medications for this visit  Allergies   Allergen Reactions    Mold Extract [Trichophyton] Sneezing    Other GI Intolerance and Vomiting     Watermellon    Pollen Extract Sneezing       Review of Systems  Oriented, engaged, slightly anxious/happy, full range affect, appropriate speech, denies suicidal/homicidal ideations/psychosis/desire to harm baby, good insight/judgement        There were no vitals filed for this visit  I spent 45 minutes directly with the patient during this visit    VIRTUAL VISIT DISCLAIMER    Luc Alfaro acknowledges that she has consented to an online visit or consultation   She understands that the online visit is based solely on information provided by her, and that, in the absence of a face-to-face physical evaluation by the physician, the diagnosis she receives is both limited and provisional in terms of accuracy and completeness  This is not intended to replace a full medical face-to-face evaluation by the physician  Andrea Garcia understands and accepts these terms

## 2020-09-18 ENCOUNTER — TELEPHONE (OUTPATIENT)
Dept: OBGYN CLINIC | Facility: CLINIC | Age: 40
End: 2020-09-18

## 2020-09-29 ENCOUNTER — TELEMEDICINE (OUTPATIENT)
Dept: BEHAVIORAL/MENTAL HEALTH CLINIC | Facility: CLINIC | Age: 40
End: 2020-09-29
Payer: COMMERCIAL

## 2020-09-29 DIAGNOSIS — O99.340 ANXIETY DISORDER AFFECTING PREGNANCY, ANTEPARTUM: Primary | ICD-10-CM

## 2020-09-29 DIAGNOSIS — F41.9 ANXIETY DISORDER AFFECTING PREGNANCY, ANTEPARTUM: Primary | ICD-10-CM

## 2020-09-29 PROCEDURE — 90832 PSYTX W PT 30 MINUTES: CPT | Performed by: SOCIAL WORKER

## 2020-09-29 NOTE — PSYCH
Virtual Brief Visit    Assessment/Plan:    Problem List Items Addressed This Visit     None      Visit Diagnoses     Anxiety disorder affecting pregnancy, antepartum    -  Primary                Reason for visit is   Chief Complaint   Patient presents with    Virtual Brief Visit        Encounter provider Radha Thomas    Provider [facility] at [facility] [place] [address] [address]  [phone]    Recent Visits  No visits were found meeting these conditions  Showing recent visits within past 7 days and meeting all other requirements     Today's Visits  Date Type Provider Dept   09/29/20 [address] [place] [address] [facility]   Showing today's visits and meeting all other requirements     Future Appointments  No visits were found meeting these conditions  Showing future appointments within next 150 days and meeting all other requirements        After connecting through [employer] and patient was informed that this is not a secure, HIPAA-complaint platform  She agrees to proceed  , the patient was identified by name and date of birth  Alfredo Gitelman was informed that this is a telemedicine visit and that the visit is being conducted through [facility] [place] and patient was informed that this is not a secure, HIPAA-complaint platform  She agrees to proceed     My office door was closed  No one else was in the room  She acknowledged consent and understanding of privacy and security of the platform  The patient has agreed to participate and understands she can discontinue the visit at any time  Patient is aware this is a billable service  Subjective    Alfredo Gitelman is a 36 y o  female was counseled for 35 minutes from 10:25 to 11:00am - late start due to patient caring for daughter  Telehealth due to covid - requested phone vs video    Oral steroids not helpful so doing a 6 injection round directly in ear for Meniere's  Some medical needs can't be met due to pregnancy now/safety for baby  Patient is 8 weeks pregnant now  Lost pregnancy in past 7 weeks so feeling positive  First OB appt set up for two weeks  Estella Spears almost 5 months old now - walking and great - forgiving self if she falls, etc   One of 's close friends  unexpectedly   trying to help with his [email or web address] business but overwhelming - help his reputation and 's finances  Sad about loss  No real  due to covid so grief complicated  Completed stopped pumping and feel positive about  Both back at work fully- her from home  Anxiety management techniques, coping skills, managing stress of chronic illness, dealing with pregnancy after loss reviewed  HPI     Past Medical History:   Diagnosis Date    Abnormal Pap smear of cervix     Anxiety     HPV (human papilloma virus) infection     Vaginal delivery 2019    Varicella     had one pox, unsure if immuned       Past Surgical History:   Procedure Laterality Date    CERVICAL BIOPSY  W/ LOOP ELECTRODE EXCISION          TONSILLECTOMY      WISDOM TOOTH EXTRACTION         Current Outpatient Medications   Medication Sig Dispense Refill    conjugated estrogens (PREMARIN) vaginal cream Insert 0 5 g into the vagina daily 30 g 1    fexofenadine (ALLEGRA) 180 MG tablet Take 1 tablet (180 mg total) by mouth daily (Patient taking differently: Take 180 mg by mouth daily as needed )      fluticasone (FLONASE) 50 mcg/act nasal spray 2 sprays into each nostril daily As needed for allergies/congestion 1 Bottle 3    meclizine (ANTIVERT) 25 mg tablet Take 1 tablet (25 mg total) by mouth every 8 (eight) hours 30 tablet 0    prednisoLONE acetate (PRED FORTE) 1 % ophthalmic suspension Use 4 drops to the right ear twice daily   10 mL 0    Prenatal MV & Min w/FA-DHA (PRENATAL ADULT GUMMY/DHA/FA) 0 4-25 MG CHEW Chew 2 tablets daily at bedtime       No current facility-administered medications for this visit  Allergies   Allergen Reactions    Mold Extract [Trichophyton] Sneezing    Other GI Intolerance and Vomiting     Watermellon    Pollen Extract Sneezing       Review of Systems  Oriented, engaged, some anxiety - excited about pregnancy, full range affect, appropriate speech, denies suicidal/homicidal ideations/psychosis/desire to harm baby, good insight/judgement    There were no vitals filed for this visit  I spent 35 minutes directly with the patient during this visit    VIRTUAL VISIT DISCLAIMER    Amanda Blair acknowledges that she has consented to an online visit or consultation  She understands that the online visit is based solely on information provided by her, and that, in the absence of a face-to-face physical evaluation by the physician, the diagnosis she receives is both limited and provisional in terms of accuracy and completeness  This is not intended to replace a full medical face-to-face evaluation by the physician  Amanda Blair understands and accepts these terms

## 2020-10-09 ENCOUNTER — INITIAL PRENATAL (OUTPATIENT)
Dept: OBGYN CLINIC | Facility: CLINIC | Age: 40
End: 2020-10-09
Payer: COMMERCIAL

## 2020-10-09 VITALS
WEIGHT: 146.6 LBS | HEART RATE: 88 BPM | DIASTOLIC BLOOD PRESSURE: 58 MMHG | HEIGHT: 66 IN | RESPIRATION RATE: 16 BRPM | BODY MASS INDEX: 23.56 KG/M2 | TEMPERATURE: 98.2 F | SYSTOLIC BLOOD PRESSURE: 100 MMHG

## 2020-10-09 DIAGNOSIS — O09.521 AMA (ADVANCED MATERNAL AGE) MULTIGRAVIDA 35+, FIRST TRIMESTER: ICD-10-CM

## 2020-10-09 DIAGNOSIS — Z34.81 ENCOUNTER FOR SUPERVISION OF OTHER NORMAL PREGNANCY IN FIRST TRIMESTER: Primary | ICD-10-CM

## 2020-10-09 DIAGNOSIS — Z3A.09 9 WEEKS GESTATION OF PREGNANCY: ICD-10-CM

## 2020-10-09 DIAGNOSIS — Z98.890 HISTORY OF LOOP ELECTRICAL EXCISION PROCEDURE (LEEP): ICD-10-CM

## 2020-10-09 DIAGNOSIS — Z23 NEED FOR INFLUENZA VACCINATION: ICD-10-CM

## 2020-10-09 PROCEDURE — 90471 IMMUNIZATION ADMIN: CPT | Performed by: OBSTETRICS & GYNECOLOGY

## 2020-10-09 PROCEDURE — 90686 IIV4 VACC NO PRSV 0.5 ML IM: CPT | Performed by: OBSTETRICS & GYNECOLOGY

## 2020-10-09 PROCEDURE — NOBC: Performed by: OBSTETRICS & GYNECOLOGY

## 2020-10-09 RX ORDER — CYANOCOBALAMIN (VITAMIN B-12) 500 MCG
1 TABLET ORAL DAILY
COMMUNITY
End: 2021-09-17 | Stop reason: ALTCHOICE

## 2020-10-13 LAB
ABO GROUP BLD: NORMAL
BASOPHILS # BLD AUTO: 0.1 X10E3/UL (ref 0–0.2)
BASOPHILS NFR BLD AUTO: 1 %
BLD GP AB SCN SERPL QL: NEGATIVE
EOSINOPHIL # BLD AUTO: 0.2 X10E3/UL (ref 0–0.4)
EOSINOPHIL NFR BLD AUTO: 3 %
ERYTHROCYTE [DISTWIDTH] IN BLOOD BY AUTOMATED COUNT: 12.9 % (ref 11.7–15.4)
GLUCOSE 1H P 50 G GLC PO SERPL-MCNC: 106 MG/DL (ref 65–139)
HBV SURFACE AG SERPL QL IA: NEGATIVE
HCT VFR BLD AUTO: 35.4 % (ref 34–46.6)
HCV AB S/CO SERPL IA: <0.1 S/CO RATIO (ref 0–0.9)
HGB BLD-MCNC: 12 G/DL (ref 11.1–15.9)
HIV 1+2 AB+HIV1 P24 AG SERPL QL IA: NON REACTIVE
IMM GRANULOCYTES # BLD: 0 X10E3/UL (ref 0–0.1)
IMM GRANULOCYTES NFR BLD: 0 %
LYMPHOCYTES # BLD AUTO: 1.7 X10E3/UL (ref 0.7–3.1)
LYMPHOCYTES NFR BLD AUTO: 21 %
MCH RBC QN AUTO: 28.4 PG (ref 26.6–33)
MCHC RBC AUTO-ENTMCNC: 33.9 G/DL (ref 31.5–35.7)
MCV RBC AUTO: 84 FL (ref 79–97)
MONOCYTES # BLD AUTO: 0.5 X10E3/UL (ref 0.1–0.9)
MONOCYTES NFR BLD AUTO: 7 %
NEUTROPHILS # BLD AUTO: 5.7 X10E3/UL (ref 1.4–7)
NEUTROPHILS NFR BLD AUTO: 68 %
PLATELET # BLD AUTO: 276 X10E3/UL (ref 150–450)
RBC # BLD AUTO: 4.22 X10E6/UL (ref 3.77–5.28)
RH BLD: POSITIVE
RPR SER QL: NON REACTIVE
RUBV IGG SERPL IA-ACNC: 1.28 INDEX
WBC # BLD AUTO: 8.2 X10E3/UL (ref 3.4–10.8)

## 2020-10-27 ENCOUNTER — TELEMEDICINE (OUTPATIENT)
Dept: BEHAVIORAL/MENTAL HEALTH CLINIC | Facility: CLINIC | Age: 40
End: 2020-10-27
Payer: COMMERCIAL

## 2020-10-27 DIAGNOSIS — F41.1 GENERALIZED ANXIETY DISORDER: Primary | ICD-10-CM

## 2020-10-27 PROCEDURE — 90834 PSYTX W PT 45 MINUTES: CPT | Performed by: SOCIAL WORKER

## 2020-10-28 ENCOUNTER — TELEPHONE (OUTPATIENT)
Dept: PERINATAL CARE | Facility: CLINIC | Age: 40
End: 2020-10-28

## 2020-10-29 ENCOUNTER — ROUTINE PRENATAL (OUTPATIENT)
Dept: PERINATAL CARE | Facility: OTHER | Age: 40
End: 2020-10-29
Payer: COMMERCIAL

## 2020-10-29 VITALS
WEIGHT: 147.71 LBS | BODY MASS INDEX: 23.74 KG/M2 | DIASTOLIC BLOOD PRESSURE: 73 MMHG | TEMPERATURE: 98 F | HEIGHT: 66 IN | SYSTOLIC BLOOD PRESSURE: 112 MMHG | HEART RATE: 108 BPM

## 2020-10-29 DIAGNOSIS — O09.529 ANTEPARTUM MULTIGRAVIDA OF ADVANCED MATERNAL AGE: Primary | ICD-10-CM

## 2020-10-29 DIAGNOSIS — Z36.82 ENCOUNTER FOR ANTENATAL SCREENING FOR NUCHAL TRANSLUCENCY: ICD-10-CM

## 2020-10-29 DIAGNOSIS — O34.10 UTERINE FIBROID IN PREGNANCY: ICD-10-CM

## 2020-10-29 DIAGNOSIS — Z3A.12 12 WEEKS GESTATION OF PREGNANCY: ICD-10-CM

## 2020-10-29 DIAGNOSIS — D25.9 UTERINE FIBROID IN PREGNANCY: ICD-10-CM

## 2020-10-29 PROCEDURE — 76801 OB US < 14 WKS SINGLE FETUS: CPT | Performed by: OBSTETRICS & GYNECOLOGY

## 2020-10-29 PROCEDURE — 76813 OB US NUCHAL MEAS 1 GEST: CPT | Performed by: OBSTETRICS & GYNECOLOGY

## 2020-10-29 PROCEDURE — 99242 OFF/OP CONSLTJ NEW/EST SF 20: CPT | Performed by: OBSTETRICS & GYNECOLOGY

## 2020-10-30 ENCOUNTER — TELEPHONE (OUTPATIENT)
Dept: PERINATAL CARE | Facility: OTHER | Age: 40
End: 2020-10-30

## 2020-11-02 ENCOUNTER — DOCUMENTATION (OUTPATIENT)
Dept: PERINATAL CARE | Facility: OTHER | Age: 40
End: 2020-11-02

## 2020-11-03 ENCOUNTER — TELEPHONE (OUTPATIENT)
Dept: PERINATAL CARE | Facility: OTHER | Age: 40
End: 2020-11-03

## 2020-11-05 ENCOUNTER — ROUTINE PRENATAL (OUTPATIENT)
Dept: OBGYN CLINIC | Facility: CLINIC | Age: 40
End: 2020-11-05

## 2020-11-05 VITALS — BODY MASS INDEX: 23.82 KG/M2 | WEIGHT: 147.6 LBS | SYSTOLIC BLOOD PRESSURE: 102 MMHG | DIASTOLIC BLOOD PRESSURE: 66 MMHG

## 2020-11-05 DIAGNOSIS — O09.529 ANTEPARTUM MULTIGRAVIDA OF ADVANCED MATERNAL AGE: ICD-10-CM

## 2020-11-05 DIAGNOSIS — Z34.81 ENCOUNTER FOR SUPERVISION OF OTHER NORMAL PREGNANCY IN FIRST TRIMESTER: Primary | ICD-10-CM

## 2020-11-05 DIAGNOSIS — Z11.3 SCREENING FOR STDS (SEXUALLY TRANSMITTED DISEASES): ICD-10-CM

## 2020-11-05 DIAGNOSIS — Z3A.13 13 WEEKS GESTATION OF PREGNANCY: ICD-10-CM

## 2020-11-05 PROCEDURE — PNV: Performed by: OBSTETRICS & GYNECOLOGY

## 2020-11-10 LAB
C TRACH RRNA SPEC QL NAA+PROBE: NEGATIVE
N GONORRHOEA RRNA SPEC QL NAA+PROBE: NEGATIVE

## 2020-11-24 ENCOUNTER — TELEMEDICINE (OUTPATIENT)
Dept: BEHAVIORAL/MENTAL HEALTH CLINIC | Facility: CLINIC | Age: 40
End: 2020-11-24
Payer: COMMERCIAL

## 2020-11-24 DIAGNOSIS — F41.1 GENERALIZED ANXIETY DISORDER: Primary | ICD-10-CM

## 2020-11-24 PROCEDURE — 90834 PSYTX W PT 45 MINUTES: CPT | Performed by: SOCIAL WORKER

## 2020-12-02 ENCOUNTER — TELEPHONE (OUTPATIENT)
Dept: PERINATAL CARE | Facility: OTHER | Age: 40
End: 2020-12-02

## 2020-12-08 ENCOUNTER — ROUTINE PRENATAL (OUTPATIENT)
Dept: OBGYN CLINIC | Facility: CLINIC | Age: 40
End: 2020-12-08

## 2020-12-08 VITALS — WEIGHT: 156 LBS | DIASTOLIC BLOOD PRESSURE: 68 MMHG | BODY MASS INDEX: 25.18 KG/M2 | SYSTOLIC BLOOD PRESSURE: 122 MMHG

## 2020-12-08 DIAGNOSIS — Z3A.18 18 WEEKS GESTATION OF PREGNANCY: ICD-10-CM

## 2020-12-08 DIAGNOSIS — Z34.02 ENCOUNTER FOR SUPERVISION OF NORMAL FIRST PREGNANCY IN SECOND TRIMESTER: Primary | ICD-10-CM

## 2020-12-08 PROCEDURE — PNV: Performed by: OBSTETRICS & GYNECOLOGY

## 2020-12-11 LAB
2ND TRIMESTER 4 SCREEN SERPL-IMP: NORMAL
AFP ADJ MOM SERPL: 0.98
AFP INTERP AMN-IMP: NORMAL
AFP INTERP SERPL-IMP: NORMAL
AFP INTERP SERPL-IMP: NORMAL
AFP SERPL-MCNC: 45 NG/ML
AGE AT DELIVERY: 40.6 YR
GA METHOD: NORMAL
GA: 18.6 WEEKS
IDDM PATIENT QL: NO
MULTIPLE PREGNANCY: NO
NEURAL TUBE DEFECT RISK FETUS: NORMAL %

## 2020-12-21 ENCOUNTER — TELEPHONE (OUTPATIENT)
Dept: PERINATAL CARE | Facility: CLINIC | Age: 40
End: 2020-12-21

## 2020-12-22 ENCOUNTER — ROUTINE PRENATAL (OUTPATIENT)
Dept: PERINATAL CARE | Facility: OTHER | Age: 40
End: 2020-12-22
Payer: COMMERCIAL

## 2020-12-22 VITALS
DIASTOLIC BLOOD PRESSURE: 63 MMHG | HEIGHT: 66 IN | SYSTOLIC BLOOD PRESSURE: 101 MMHG | HEART RATE: 88 BPM | WEIGHT: 157.41 LBS | BODY MASS INDEX: 25.3 KG/M2

## 2020-12-22 DIAGNOSIS — O09.529 ANTEPARTUM MULTIGRAVIDA OF ADVANCED MATERNAL AGE: ICD-10-CM

## 2020-12-22 DIAGNOSIS — O35.2XX0 HEREDITARY DISEASE IN FAMILY POSSIBLY AFFECTING FETUS, AFFECTING MANAGEMENT OF MOTHER IN PREGNANCY, SINGLE OR UNSPECIFIED FETUS: ICD-10-CM

## 2020-12-22 DIAGNOSIS — Z3A.20 20 WEEKS GESTATION OF PREGNANCY: Primary | ICD-10-CM

## 2020-12-22 PROCEDURE — 76817 TRANSVAGINAL US OBSTETRIC: CPT | Performed by: OBSTETRICS & GYNECOLOGY

## 2020-12-22 PROCEDURE — 76811 OB US DETAILED SNGL FETUS: CPT | Performed by: OBSTETRICS & GYNECOLOGY

## 2021-01-06 ENCOUNTER — ROUTINE PRENATAL (OUTPATIENT)
Dept: OBGYN CLINIC | Facility: CLINIC | Age: 41
End: 2021-01-06

## 2021-01-06 ENCOUNTER — TELEMEDICINE (OUTPATIENT)
Dept: BEHAVIORAL/MENTAL HEALTH CLINIC | Facility: CLINIC | Age: 41
End: 2021-01-06
Payer: COMMERCIAL

## 2021-01-06 VITALS — SYSTOLIC BLOOD PRESSURE: 110 MMHG | DIASTOLIC BLOOD PRESSURE: 64 MMHG | WEIGHT: 161 LBS | BODY MASS INDEX: 25.99 KG/M2

## 2021-01-06 DIAGNOSIS — F41.1 GENERALIZED ANXIETY DISORDER: Primary | ICD-10-CM

## 2021-01-06 DIAGNOSIS — O09.529 ANTEPARTUM MULTIGRAVIDA OF ADVANCED MATERNAL AGE: ICD-10-CM

## 2021-01-06 DIAGNOSIS — D25.9 UTERINE FIBROID IN PREGNANCY: ICD-10-CM

## 2021-01-06 DIAGNOSIS — O34.10 UTERINE FIBROID IN PREGNANCY: ICD-10-CM

## 2021-01-06 DIAGNOSIS — O35.2XX0 HEREDITARY DISEASE IN FAMILY POSSIBLY AFFECTING FETUS, AFFECTING MANAGEMENT OF MOTHER IN PREGNANCY, SINGLE OR UNSPECIFIED FETUS: ICD-10-CM

## 2021-01-06 DIAGNOSIS — Z34.82 ENCOUNTER FOR SUPERVISION OF OTHER NORMAL PREGNANCY IN SECOND TRIMESTER: Primary | ICD-10-CM

## 2021-01-06 DIAGNOSIS — Z3A.22 22 WEEKS GESTATION OF PREGNANCY: ICD-10-CM

## 2021-01-06 PROBLEM — Z34.90 SUPERVISION OF NORMAL PREGNANCY: Status: ACTIVE | Noted: 2020-12-08

## 2021-01-06 PROCEDURE — 90834 PSYTX W PT 45 MINUTES: CPT | Performed by: SOCIAL WORKER

## 2021-01-06 PROCEDURE — PNV: Performed by: OBSTETRICS & GYNECOLOGY

## 2021-01-06 NOTE — PROGRESS NOTES
36 y o    female at 18 1 wga EGA for PNV  BP : 110/64  TWlb    Patient presents for return OB visit  Feeling well and has minimal complaints today  Denies vaginal bleeding and abdominal pain  Level 2 ultrasound reviewed  Fetal size equal to dates  No fetal anomalies noted  Posterior placenta, low lying    -  Due to history of tetralogy of Fallot in father of baby, fetal echocardiogram recommended between 22-24 weeks  Follow-up ultrasound at 32 weeks for growth and re-evaluation placental location  Follow-up in 4 weeks

## 2021-01-06 NOTE — PSYCH
Virtual Brief Visit    Assessment/Plan:    Problem List Items Addressed This Visit     None      Visit Diagnoses     Generalized anxiety disorder    -  Primary                Reason for visit is   Chief Complaint   Patient presents with    Virtual Brief Visit        Encounter provider Radha Mendez    Provider located at [address] [facility]  [address]  [phone]    Recent Visits  No visits were found meeting these conditions  Showing recent visits within past 7 days and meeting all other requirements     Today's Visits  Date Type Provider Dept   01/06/21 [address] [facility],  [facility]   Showing today's visits and meeting all other requirements     Future Appointments  No visits were found meeting these conditions  Showing future appointments within next 150 days and meeting all other requirements        After connecting through [employer] and patient was informed that this is not a secure, HIPAA-compliant platform  She agrees to proceed  , the patient was identified by name and date of birth  Cleo Trammell was informed that this is a telemedicine visit and that the visit is being conducted through [facility] [place] and patient was informed that this is not a secure, HIPAA-compliant platform  She agrees to proceed     My office door was closed  No one else was in the room  She acknowledged consent and understanding of privacy and security of the platform  The patient has agreed to participate and understands she can discontinue the visit at any time  Patient is aware this is a billable service  Subjective    Cleo Trammell is a 36 y o  female was counseled for 45 minutes from 9:00 - 9:45am - telehealth due to covid - declined video and requested a telephone session  Viridiana Ortega who turned 1 on 12/6 - having sleep regression lately but teething     Patient feels tired - excited that she's over 22 weeks pregnant now - feeling glad that baby is viable now  Has cardio peds appt for baby due to 's heart issue as a baby  Low placenta and some restrictions related to activity - may need a  but trying to be positive that placenta growth will be ok  Some fears about getting back in shape - hasn;t been to gym due to to schedule and covid but worked out at gym throughout pregnancy with Volelham Fearing  Volelham Fearing returning to day care tomorrow and will be there two days a week - Petty Merino has anxiety about trusting "strangers" after only family being with Ryan Fearing  Explored lack of "control" - not knowing exactly what Ryan Fearing is doing, etc - can't go inside due to covid  Anxiety about covid and other families using day care  Her parents visit monthly and and are high risk so will stop visiting as Volney Fearing may be exposed - parents will get vaccination  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  After covid, will be back at work 4 days a week so new baby (not finding out sex) will be in day care earlier  Some challenges with body image/weight gain during pregnancy  Decreased desire for intimacy  Have been discussing and are working on it  Anxiety prevention and management, CBT - working on thought process to minimize anxiety, self care and parenting reviewed      HPI     Past Medical History:   Diagnosis Date    Abnormal Pap smear of cervix     Anxiety     HPV (human papilloma virus) infection     Meniere's disease     Vaginal delivery 2019    Varicella     had one pox, unsure if immuned       Past Surgical History:   Procedure Laterality Date    CERVICAL BIOPSY  W/ LOOP ELECTRODE EXCISION          TONSILLECTOMY      WISDOM TOOTH EXTRACTION         Current Outpatient Medications   Medication Sig Dispense Refill    fexofenadine (ALLEGRA) 180 MG tablet Take 1 tablet (180 mg total) by mouth daily      fluticasone (FLONASE) 50 mcg/act nasal spray 2 sprays into each nostril daily As needed for allergies/congestion 1 Bottle 3    Folic Acid 0 8 MG CAPS Take 1 capsule by mouth daily      meclizine (ANTIVERT) 25 mg tablet Take 1 tablet (25 mg total) by mouth every 8 (eight) hours 30 tablet 0    Prenatal MV & Min w/FA-DHA (PRENATAL ADULT GUMMY/DHA/FA) 0 4-25 MG CHEW Chew 2 tablets daily at bedtime       No current facility-administered medications for this visit  Allergies   Allergen Reactions    Mold Extract [Trichophyton] Sneezing    Other GI Intolerance and Vomiting     Watermellon    Pollen Extract Sneezing       Review of Systems  Oriented, engaged, happy/calm, full range affect, appropriate speech, denies suicidal/homicidal ideations/psychosis/desire to harm baby, good insight/judgement    There were no vitals filed for this visit  I spent 45 minutes directly with the patient during this visit    VIRTUAL VISIT DISCLAIMER    Steve Edwards acknowledges that she has consented to an online visit or consultation  She understands that the online visit is based solely on information provided by her, and that, in the absence of a face-to-face physical evaluation by the physician, the diagnosis she receives is both limited and provisional in terms of accuracy and completeness  This is not intended to replace a full medical face-to-face evaluation by the physician  Steve Edwards understands and accepts these terms

## 2021-01-11 ENCOUNTER — TELEPHONE (OUTPATIENT)
Dept: PERINATAL CARE | Facility: OTHER | Age: 41
End: 2021-01-11

## 2021-01-11 NOTE — TELEPHONE ENCOUNTER
Spoke with patient and confirmed appointment with [facility]  1 support person ( must be over age of 15) may accompany patient  Will you and your support person be able to wear a mask ,without a valve , during entire appointment? YES   To minimize your exposure in our waiting area,check in and rooming questions will be done via phone  When you arrive in the parking lot please call the following inside line # prior to entering office:    [place]: [phone]    Have you or your support person traveled outside the state in the last 2 weeks? NO  If yes, what state did you travel to? Do you or your support person have:  Fever or flu- like symptoms? NO  Symptoms of upper respiratory infection like runny nose, sore throat or cough? NO  Do you have new headache that you have not had in the past?NO  Have you experienced any new shortness of breath recently? NO  Do you have any new loss of taste or smell? NO  Do you have any new diarrhea, nausea or vomiting? NO  Have you recently been in contact with anyone who has been sick or diagnosed with COVID-19 infection? NO  Have you been recommended to quarantine because of an exposure to a confirmed positive COVID19 person? NO  Have you recently been tested for COVID19? NO    Patient verbalized understanding of all instructions   -------------------------------------------------------------

## 2021-01-12 ENCOUNTER — ROUTINE PRENATAL (OUTPATIENT)
Dept: PEDIATRIC CARDIOLOGY | Facility: CLINIC | Age: 41
End: 2021-01-12
Payer: COMMERCIAL

## 2021-01-12 VITALS
BODY MASS INDEX: 26.33 KG/M2 | SYSTOLIC BLOOD PRESSURE: 106 MMHG | HEIGHT: 66 IN | HEART RATE: 83 BPM | DIASTOLIC BLOOD PRESSURE: 63 MMHG | WEIGHT: 163.8 LBS

## 2021-01-12 DIAGNOSIS — O35.2XX0 HEREDITARY DISEASE IN FAMILY POSSIBLY AFFECTING FETUS, AFFECTING MANAGEMENT OF MOTHER IN PREGNANCY, SINGLE OR UNSPECIFIED FETUS: ICD-10-CM

## 2021-01-12 PROCEDURE — 76825 ECHO EXAM OF FETAL HEART: CPT | Performed by: PEDIATRICS

## 2021-01-12 PROCEDURE — 99244 OFF/OP CNSLTJ NEW/EST MOD 40: CPT | Performed by: PEDIATRICS

## 2021-01-12 PROCEDURE — 76827 ECHO EXAM OF FETAL HEART: CPT | Performed by: PEDIATRICS

## 2021-01-12 PROCEDURE — 93325 DOPPLER ECHO COLOR FLOW MAPG: CPT | Performed by: PEDIATRICS

## 2021-01-12 NOTE — PROGRESS NOTES
Fetal Cardiology Consult    Date of Visit:   2021  Gestational Age: 18w1d     Dear Dr Cristian Pereira     I had the opportunity to meet with Rekha Montesinos and her  today for a Fetal Cardiology Consult and Fetal Echocardiogram   The indication for the procedure was father of the baby with tetralogy of Fallot  A fetal echocardiogram with color doppler flow mapping was performed  There was normal anatomy and function with no obvious defects  The full fetal echocardiogram report is available in the electronic medical record  I discussed the normal anatomy and physiology, and recommended normal care after birth  I answered all questions  We also discussed, that due to the technical limitations of fetal echocardiography and the nature of fetal circulation, a number of cardiovascular defects cannot be definitively ruled out at this stage  Examples include but are not limited to: ASD, PDA, small VSD, coarctation of the aorta, partial anomalous pulmonary venous connection  Recommendation:  -normal  care with no echocardiogram needed after birth    Thank you for allowing me to participate in Makenna's care  Feel free to contact me with questions  I spent 30 minutes counseling the patient about her fetal echocardiogram findings  All of this time was face to face      Marry Cooper MD  Pediatric Cardiologist

## 2021-02-03 ENCOUNTER — ROUTINE PRENATAL (OUTPATIENT)
Dept: OBGYN CLINIC | Facility: CLINIC | Age: 41
End: 2021-02-03

## 2021-02-03 VITALS — SYSTOLIC BLOOD PRESSURE: 112 MMHG | DIASTOLIC BLOOD PRESSURE: 66 MMHG | WEIGHT: 164.8 LBS | BODY MASS INDEX: 26.6 KG/M2

## 2021-02-03 DIAGNOSIS — Z3A.26 26 WEEKS GESTATION OF PREGNANCY: ICD-10-CM

## 2021-02-03 DIAGNOSIS — Z34.82 ENCOUNTER FOR SUPERVISION OF OTHER NORMAL PREGNANCY IN SECOND TRIMESTER: Primary | ICD-10-CM

## 2021-02-03 PROCEDURE — PNV: Performed by: OBSTETRICS & GYNECOLOGY

## 2021-02-03 NOTE — PROGRESS NOTES
36 y o    female at 33 2 wga EGA for PNV  BP : 112/66  TW  Patient is feeling well  She has no complaints  Ordered 28 week labs  Reviewed COVID precautions and vaccine  Patient is interested in getting vaccine  Reviewed  labor precautions  Repeat growth 32 weeks  Follow-up in 2 weeks

## 2021-02-10 ENCOUNTER — TELEMEDICINE (OUTPATIENT)
Dept: BEHAVIORAL/MENTAL HEALTH CLINIC | Facility: CLINIC | Age: 41
End: 2021-02-10
Payer: COMMERCIAL

## 2021-02-10 DIAGNOSIS — F41.1 GENERALIZED ANXIETY DISORDER: Primary | ICD-10-CM

## 2021-02-10 PROCEDURE — 90834 PSYTX W PT 45 MINUTES: CPT | Performed by: SOCIAL WORKER

## 2021-02-10 NOTE — PSYCH
Virtual Brief Visit    Assessment/Plan:    Problem List Items Addressed This Visit     None      Visit Diagnoses     Generalized anxiety disorder    -  Primary                Reason for visit is   Chief Complaint   Patient presents with    Virtual Brief Visit        Encounter provider Jackson Rueda    Provider located at [address] [facility] [address]/ [address]  [address] [address] [address]  [phone]    Recent Visits  No visits were found meeting these conditions  Showing recent visits within past 7 days and meeting all other requirements     Today's Visits  Date Type Provider Dept   02/10/21 [address] [facility], [facility]   Showing today's visits and meeting all other requirements     Future Appointments  No visits were found meeting these conditions  Showing future appointments within next 150 days and meeting all other requirements        After connecting through [employer] and patient was informed that this is not a secure, HIPAA-compliant platform  She agrees to proceed  , the patient was identified by name and date of birth  Chace Adams was informed that this is a telemedicine visit and that the visit is being conducted through [facility] and patient was informed that this is not a secure, HIPAA-compliant platform  She agrees to proceed     My office door was closed  No one else was in the room  She acknowledged consent and understanding of privacy and security of the platform  The patient has agreed to participate and understands she can discontinue the visit at any time  Patient is aware this is a billable service  Subjective    Chace Adams is a 36 y o  female was counseled for 45 minutes from 9:00 - 9:45am - telehealth due to covid - declined video and requested telephone session  Mixed feelings about day care - Hannah crying a lot at day care    Not drinking bottles at day care - not eating as much  Had first cold as well  Some recent temper tantrums - discussed age appropriate behaviors  Only has to go into work 2 days a week but thinks 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  Creating schedule that works well  Worries about transition with new baby - due 5/8  Explored thoughts - CBT related to fears - recognizing normal toddler emotions, not trying to achieve perfection and having realistic thoughts  People giving a lot of advice about 2nd creating anxiety  Fears about breastfeeding new baby - how will it go - challenges again  Trying not to have anxiety that approaches breastfeeding negatively   being cheerleading but worried about disappointing him if breastfeeding isn't smooth again  Role played communication with spouse - "thanks for support but also feels like pressure "  Positive heart echo completed for baby and all looks good despite 's heart defect at birth - feels positive  Feels anticipating labor at end will be more clear for this baby  Water broke but minimal pain so unsure in labor  Aleyda Hinojosa will be watched by her parents who will come and stay for while  First time away from Aleyda Hinojosa overnight  Goes to [employer] [facility] 2X a month and Blank Trejo puts her to bed, take turns and doesn't rely on only one of them  Coping skills, Anxiety management through CBT methods/reframing, communicating needs with spouse, parenting/toddler expectations reviewed        HPI     Past Medical History:   Diagnosis Date    Abnormal Pap smear of cervix     Anxiety     HPV (human papilloma virus) infection     Meniere's disease     Vaginal delivery 12/2019    Varicella     had one pox, unsure if immuned       Past Surgical History:   Procedure Laterality Date    CERVICAL BIOPSY  W/ LOOP ELECTRODE EXCISION      2015    TONSILLECTOMY      WISDOM TOOTH EXTRACTION         Current Outpatient Medications   Medication Sig Dispense Refill    fexofenadine (ALLEGRA) 180 MG tablet Take 1 tablet (180 mg total) by mouth daily      fluticasone (FLONASE) 50 mcg/act nasal spray 2 sprays into each nostril daily As needed for allergies/congestion 1 Bottle 3    Folic Acid 0 8 MG CAPS Take 1 capsule by mouth daily      meclizine (ANTIVERT) 25 mg tablet Take 1 tablet (25 mg total) by mouth every 8 (eight) hours 30 tablet 0    Prenatal MV & Min w/FA-DHA (PRENATAL ADULT GUMMY/DHA/FA) 0 4-25 MG CHEW Chew 2 tablets daily at bedtime       No current facility-administered medications for this visit  Allergies   Allergen Reactions    Mold Extract [Trichophyton] Sneezing    Other GI Intolerance and Vomiting     Watermellon    Pollen Extract Sneezing       Review of Systems  Oriented, engaged, slightly anxious, full range affect, appropriate speech, denies suicidal/homicidal ideations/psychosis/desire to harm baby, good insight/judgement - Hannah fussing at times - positive interactions and support  There were no vitals filed for this visit  I spent 45 minutes directly with the patient during this visit    VIRTUAL VISIT DISCLAIMER    Jean Pierre Wheatley acknowledges that she has consented to an online visit or consultation  She understands that the online visit is based solely on information provided by her, and that, in the absence of a face-to-face physical evaluation by the physician, the diagnosis she receives is both limited and provisional in terms of accuracy and completeness  This is not intended to replace a full medical face-to-face evaluation by the physician  Jean Pierre Wheatley understands and accepts these terms

## 2021-02-25 LAB
ERYTHROCYTE [DISTWIDTH] IN BLOOD BY AUTOMATED COUNT: 12.6 % (ref 11.7–15.4)
GLUCOSE 1H P 50 G GLC PO SERPL-MCNC: 122 MG/DL (ref 65–139)
HCT VFR BLD AUTO: 32.1 % (ref 34–46.6)
HGB BLD-MCNC: 10.3 G/DL (ref 11.1–15.9)
MCH RBC QN AUTO: 28.1 PG (ref 26.6–33)
MCHC RBC AUTO-ENTMCNC: 32.1 G/DL (ref 31.5–35.7)
MCV RBC AUTO: 88 FL (ref 79–97)
PLATELET # BLD AUTO: 327 X10E3/UL (ref 150–450)
RBC # BLD AUTO: 3.66 X10E6/UL (ref 3.77–5.28)
RPR SER QL: NON REACTIVE
WBC # BLD AUTO: 12.2 X10E3/UL (ref 3.4–10.8)

## 2021-02-26 ENCOUNTER — ROUTINE PRENATAL (OUTPATIENT)
Dept: OBGYN CLINIC | Facility: CLINIC | Age: 41
End: 2021-02-26
Payer: COMMERCIAL

## 2021-02-26 VITALS — SYSTOLIC BLOOD PRESSURE: 116 MMHG | WEIGHT: 175 LBS | DIASTOLIC BLOOD PRESSURE: 72 MMHG | BODY MASS INDEX: 28.25 KG/M2

## 2021-02-26 DIAGNOSIS — Z3A.29 29 WEEKS GESTATION OF PREGNANCY: ICD-10-CM

## 2021-02-26 DIAGNOSIS — O09.529 ANTEPARTUM MULTIGRAVIDA OF ADVANCED MATERNAL AGE: Primary | ICD-10-CM

## 2021-02-26 DIAGNOSIS — Z23 NEED FOR TDAP VACCINATION: ICD-10-CM

## 2021-02-26 DIAGNOSIS — O34.10 UTERINE FIBROID IN PREGNANCY: ICD-10-CM

## 2021-02-26 DIAGNOSIS — D25.9 UTERINE FIBROID IN PREGNANCY: ICD-10-CM

## 2021-02-26 PROCEDURE — 90471 IMMUNIZATION ADMIN: CPT | Performed by: OBSTETRICS & GYNECOLOGY

## 2021-02-26 PROCEDURE — PNV: Performed by: OBSTETRICS & GYNECOLOGY

## 2021-02-26 PROCEDURE — 90715 TDAP VACCINE 7 YRS/> IM: CPT | Performed by: OBSTETRICS & GYNECOLOGY

## 2021-02-26 NOTE — PROGRESS NOTES
Red folder given and reviewed with pt  Expresses interest in Tdap, given today   Declines breast pump order  Urine dip neg/neg

## 2021-02-26 NOTE — PROGRESS NOTES
36 y o    female at [address] for PNV  BP : 116/72  TW  Some lower abdominal cramping  No LOF or vaginal bleeding  Good FM  Has US 4/5  AMA - needs NST @ 36 wks   Red folder reviewed  Consent signed, ok with transfusion  Will obtain living will  28wk labs reviewed  Discussed perineal massage  RTO in 2 weeks

## 2021-03-10 ENCOUNTER — ROUTINE PRENATAL (OUTPATIENT)
Dept: OBGYN CLINIC | Facility: CLINIC | Age: 41
End: 2021-03-10

## 2021-03-10 VITALS — WEIGHT: 177 LBS | DIASTOLIC BLOOD PRESSURE: 72 MMHG | BODY MASS INDEX: 28.57 KG/M2 | SYSTOLIC BLOOD PRESSURE: 118 MMHG

## 2021-03-10 DIAGNOSIS — Z3A.31 31 WEEKS GESTATION OF PREGNANCY: ICD-10-CM

## 2021-03-10 DIAGNOSIS — O09.529 ANTEPARTUM MULTIGRAVIDA OF ADVANCED MATERNAL AGE: Primary | ICD-10-CM

## 2021-03-10 DIAGNOSIS — Z34.83 ENCOUNTER FOR SUPERVISION OF OTHER NORMAL PREGNANCY IN THIRD TRIMESTER: ICD-10-CM

## 2021-03-10 DIAGNOSIS — Z23 ENCOUNTER FOR IMMUNIZATION: ICD-10-CM

## 2021-03-10 PROCEDURE — PNV: Performed by: OBSTETRICS & GYNECOLOGY

## 2021-03-10 RX ORDER — FERROUS SULFATE 325(65) MG
325 TABLET ORAL
COMMUNITY
End: 2021-09-17 | Stop reason: ALTCHOICE

## 2021-03-10 RX ORDER — DOCUSATE SODIUM 100 MG/1
100 CAPSULE, LIQUID FILLED ORAL 2 TIMES DAILY
COMMUNITY
End: 2021-09-17 | Stop reason: ALTCHOICE

## 2021-03-10 NOTE — PROGRESS NOTES
36 y o    female at 27 2 wga EGA for PNV  BP : 118/72  TW   patient is feeling well  She has no complaints      Twenty-eight week labs reviewed   reviewed  labor precautions and fetal kick counts  Patient attempting to get an appointment for COVID vaccine  Follow-up in 2 weeks

## 2021-03-15 ENCOUNTER — IMMUNIZATIONS (OUTPATIENT)
Dept: FAMILY MEDICINE CLINIC | Facility: HOSPITAL | Age: 41
End: 2021-03-15

## 2021-03-15 DIAGNOSIS — Z23 ENCOUNTER FOR IMMUNIZATION: Primary | ICD-10-CM

## 2021-03-15 PROCEDURE — 0001A SARS-COV-2 / COVID-19 MRNA VACCINE (PFIZER-BIONTECH) 30 MCG: CPT

## 2021-03-15 PROCEDURE — 91300 SARS-COV-2 / COVID-19 MRNA VACCINE (PFIZER-BIONTECH) 30 MCG: CPT

## 2021-03-17 ENCOUNTER — TELEMEDICINE (OUTPATIENT)
Dept: BEHAVIORAL/MENTAL HEALTH CLINIC | Facility: CLINIC | Age: 41
End: 2021-03-17
Payer: COMMERCIAL

## 2021-03-17 DIAGNOSIS — F41.1 GENERALIZED ANXIETY DISORDER: Primary | ICD-10-CM

## 2021-03-17 PROCEDURE — 90834 PSYTX W PT 45 MINUTES: CPT | Performed by: SOCIAL WORKER

## 2021-03-17 NOTE — PSYCH
Virtual Brief Visit    Assessment/Plan:    Problem List Items Addressed This Visit     None      Visit Diagnoses     Generalized anxiety disorder    -  Primary                Reason for visit is   Chief Complaint   Patient presents with    Virtual Brief Visit        Encounter provider Kathrine Landau    Provider located at [address] [facility] OB/ [address]  [address] [address] [address]  [phone]    Recent Visits  No visits were found meeting these conditions  Showing recent visits within past 7 days and meeting all other requirements     Today's Visits  Date Type Provider Dept   03/17/21 [address] [place] [address], [facility]   Showing today's visits and meeting all other requirements     Future Appointments  No visits were found meeting these conditions  Showing future appointments within next 150 days and meeting all other requirements        After connecting through telephone and patient was informed that this is not a secure, HIPAA-compliant platform  She agrees to proceed  , the patient was identified by name and date of birth  Rowena Lesch was informed that this is a telemedicine visit and that the visit is being conducted through telephone and patient was informed that this is not a secure, HIPAA-compliant platform  She agrees to proceed     My office door was closed  No one else was in the room  She acknowledged consent and understanding of privacy and security of the platform  The patient has agreed to participate and understands she can discontinue the visit at any time  Patient is aware this is a billable service  Subjective    Rowena Lesch is a 36 y o  female was counseled for 45 minutes from 9:00 - 9:45am - telehealth due to covid  Declined video and requested telephone session instead  Baby healthy - due 5/8     [facility]  is closing and was finally adjusting - not crying as much  Feels stressed due to finding a new place - 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 - with new baby, could have gone and  in person  Disappointed and sad that plan is not going to happen  Was getting a big discount at day care as well so financially stressed about costs and thinking long term savings to py for two kids in   May take fall semester off and stay home with kids but would be without pay  Added choices of maternity leave, off, etc feels "like am I making the right choice?"    Katherine Paul is almost 12 months old now  Some worries about her not saying any words  Explored decision making and prioritizing self care/own emotions when making decisions  May buy [place] breastpump to make pumping for 2nd baby easier  Waiting until baby born to find out gender - calling Baby Ayala as moving during [address] then  called baby dos so now claling Baby boodos  Decidined on Heddy Grieve for boy and Nydia for a girl  CBT - preventing perfection thoughts that lead to challenges  Coping skills, decisions making, parenting and preventing postpartum anxiety reviewed      HPI     Past Medical History:   Diagnosis Date    Abnormal Pap smear of cervix     Anxiety     HPV (human papilloma virus) infection     Meniere's disease     Vaginal delivery 12/2019    Varicella     had one pox, unsure if immuned       Past Surgical History:   Procedure Laterality Date    CERVICAL BIOPSY  W/ LOOP ELECTRODE EXCISION      2015    TONSILLECTOMY      WISDOM TOOTH EXTRACTION         Current Outpatient Medications   Medication Sig Dispense Refill    docusate sodium (COLACE) 100 mg capsule Take 100 mg by mouth 2 (two) times a day      ferrous sulfate 325 (65 Fe) mg tablet Take 325 mg by mouth daily with breakfast      fexofenadine (ALLEGRA) 180 MG tablet Take 1 tablet (180 mg total) by mouth daily      fluticasone (FLONASE) 50 mcg/act nasal spray 2 sprays into each nostril daily As needed for allergies/congestion 1 Bottle 3    Folic Acid 0 8 MG CAPS Take 1 capsule by mouth daily      meclizine (ANTIVERT) 25 mg tablet Take 1 tablet (25 mg total) by mouth every 8 (eight) hours 30 tablet 0    Prenatal MV & Min w/FA-DHA (PRENATAL ADULT GUMMY/DHA/FA) 0 4-25 MG CHEW Chew 2 tablets daily at bedtime       No current facility-administered medications for this visit  Allergies   Allergen Reactions    Mold Extract [Trichophyton] Sneezing    Other GI Intolerance and Vomiting     Watermellon    Pollen Extract Sneezing       Review of Systems  Oriented, engaged, anxious, full range affect, appropriate speech, denies suicidal/homicidal ideations/psychosis/desire to harm daughter, good insight/judgement      I spent 45 minutes directly with the patient during this visit    VIRTUAL VISIT DISCLAIMER    Royce Aguilar acknowledges that she has consented to an online visit or consultation  She understands that the online visit is based solely on information provided by her, and that, in the absence of a face-to-face physical evaluation by the physician, the diagnosis she receives is both limited and provisional in terms of accuracy and completeness  This is not intended to replace a full medical face-to-face evaluation by the physician  Royce Aguilar understands and accepts these terms

## 2021-03-22 ENCOUNTER — ROUTINE PRENATAL (OUTPATIENT)
Dept: OBGYN CLINIC | Facility: CLINIC | Age: 41
End: 2021-03-22

## 2021-03-22 VITALS — DIASTOLIC BLOOD PRESSURE: 68 MMHG | WEIGHT: 182 LBS | BODY MASS INDEX: 29.38 KG/M2 | SYSTOLIC BLOOD PRESSURE: 116 MMHG

## 2021-03-22 DIAGNOSIS — O09.529 ANTEPARTUM MULTIGRAVIDA OF ADVANCED MATERNAL AGE: Primary | ICD-10-CM

## 2021-03-22 DIAGNOSIS — Z3A.33 33 WEEKS GESTATION OF PREGNANCY: ICD-10-CM

## 2021-03-22 PROCEDURE — PNV: Performed by: OBSTETRICS & GYNECOLOGY

## 2021-03-22 NOTE — PROGRESS NOTES
36 y o    female at 33w2d EGA for PNV  BP : 116/68  TWlbs  Denies lof or vaginal bleeding  Occasional mild contractions, nothing consistent or painful  Good FM  RTO in 2 weeks  Weekly NST at 36 wks

## 2021-04-05 ENCOUNTER — ULTRASOUND (OUTPATIENT)
Dept: PERINATAL CARE | Facility: OTHER | Age: 41
End: 2021-04-05
Payer: COMMERCIAL

## 2021-04-05 VITALS — WEIGHT: 183.64 LBS | BODY MASS INDEX: 29.64 KG/M2

## 2021-04-05 DIAGNOSIS — O09.529 ANTEPARTUM MULTIGRAVIDA OF ADVANCED MATERNAL AGE: ICD-10-CM

## 2021-04-05 DIAGNOSIS — Z3A.35 35 WEEKS GESTATION OF PREGNANCY: ICD-10-CM

## 2021-04-05 DIAGNOSIS — Z36.89 ENCOUNTER FOR ULTRASOUND TO ASSESS FETAL GROWTH: Primary | ICD-10-CM

## 2021-04-05 DIAGNOSIS — IMO0002 EVALUATE ANATOMY NOT SEEN ON PRIOR SONOGRAM: ICD-10-CM

## 2021-04-05 DIAGNOSIS — D25.9 UTERINE FIBROID IN PREGNANCY: ICD-10-CM

## 2021-04-05 DIAGNOSIS — O34.10 UTERINE FIBROID IN PREGNANCY: ICD-10-CM

## 2021-04-05 PROBLEM — R10.2 PERINEAL PAIN: Status: RESOLVED | Noted: 2020-06-04 | Resolved: 2021-04-05

## 2021-04-05 PROCEDURE — 76816 OB US FOLLOW-UP PER FETUS: CPT | Performed by: OBSTETRICS & GYNECOLOGY

## 2021-04-05 PROCEDURE — 99213 OFFICE O/P EST LOW 20 MIN: CPT | Performed by: OBSTETRICS & GYNECOLOGY

## 2021-04-05 NOTE — PROGRESS NOTES
[facility] Divina Rizo was seen today at 35w2d for followup placental location ultrasound with fetal growth measurement  See ultrasound report under "OB Procedures" tab    Please don't hesitate to contact our office with any concerns or questions   -Joaquin Edgar MD

## 2021-04-05 NOTE — LETTER
April 5, 2021     [address]  [facility]    Patient: Cherelle Hunt   YOB: 1980   Date of Visit: 4/5/2021       Dear Dr Aura Saldivar:    Low lying placenta resolved  Fibroids are small and not in lower uterine segment  She needs weekly antepartum surveillance at 36 weeks for AMA, which I instructed her to schedule with your office  Thanks! Sincerely,        Fe Guzman MD        CC: No Recipients  Fe Guzman MD  4/5/2021  9:18 AM  Sign when Signing Visit  [address]: Ms Janina Carlson was seen today at 35w2d for followup placental location ultrasound with fetal growth measurement  See ultrasound report under "OB Procedures" tab    Please don't hesitate to contact our office with any concerns or questions   -Fe Guzman MD

## 2021-04-05 NOTE — PATIENT INSTRUCTIONS
Baby looks great today! Low lying placenta resolved and fibroids look stable  Keep monitoring baby's kicks  You need a non-stress test once a week at 36 weeks, which is usually done in your OB office  Please call them to schedule if this is not already arranged  Best wishes for a healthy delivery! Thank you for choosing [facility] for your visit today  We appreciate your trust and the opportunity to assist your obstetrician with your care  We value your feedback regarding the care we are providing  Following today's appointment, you may receive a patient satisfaction survey by mail or e-mail requesting feedback on your visit  We ask that you complete the survey to  help us understand how we are doing  Thank you for in advance for your feedback

## 2021-04-07 ENCOUNTER — ROUTINE PRENATAL (OUTPATIENT)
Dept: OBGYN CLINIC | Facility: CLINIC | Age: 41
End: 2021-04-07

## 2021-04-07 ENCOUNTER — IMMUNIZATIONS (OUTPATIENT)
Dept: FAMILY MEDICINE CLINIC | Facility: HOSPITAL | Age: 41
End: 2021-04-07

## 2021-04-07 VITALS — SYSTOLIC BLOOD PRESSURE: 118 MMHG | DIASTOLIC BLOOD PRESSURE: 78 MMHG | BODY MASS INDEX: 30.02 KG/M2 | WEIGHT: 186 LBS

## 2021-04-07 DIAGNOSIS — O09.523 ELDERLY MULTIGRAVIDA IN THIRD TRIMESTER: Primary | ICD-10-CM

## 2021-04-07 DIAGNOSIS — Z3A.35 35 WEEKS GESTATION OF PREGNANCY: ICD-10-CM

## 2021-04-07 DIAGNOSIS — Z23 ENCOUNTER FOR IMMUNIZATION: Primary | ICD-10-CM

## 2021-04-07 PROCEDURE — 0002A SARS-COV-2 / COVID-19 MRNA VACCINE (PFIZER-BIONTECH) 30 MCG: CPT

## 2021-04-07 PROCEDURE — 91300 SARS-COV-2 / COVID-19 MRNA VACCINE (PFIZER-BIONTECH) 30 MCG: CPT

## 2021-04-07 PROCEDURE — PNV: Performed by: PHYSICIAN ASSISTANT

## 2021-04-07 NOTE — PROGRESS NOTES
Patient is a 35 YO  female presenting to the office at 35 4 weeks for routine OB care  BP: 118/78  TWlb  Fetal Movement: present, good movement  Patient feeling well today and has no complaints  She completed her last OB US with [facility] this past Monday  Patient to start weekly NST at 36 weeks     Discussed PTL signs, signs of PIH  Patient denies CTX, LOF, vaginal bleeding  Reviewed normal FM  Discussed weight gain  Patient getting second covid vaccine today, had flu and tdap also  Reviewed GBS swab at next visit  Call for concerns  RTO 1 week, OBFU/NST

## 2021-04-12 ENCOUNTER — ROUTINE PRENATAL (OUTPATIENT)
Dept: OBGYN CLINIC | Facility: CLINIC | Age: 41
End: 2021-04-12

## 2021-04-12 VITALS — DIASTOLIC BLOOD PRESSURE: 64 MMHG | BODY MASS INDEX: 30.41 KG/M2 | WEIGHT: 188.4 LBS | SYSTOLIC BLOOD PRESSURE: 120 MMHG

## 2021-04-12 DIAGNOSIS — Z34.83 ENCOUNTER FOR SUPERVISION OF OTHER NORMAL PREGNANCY IN THIRD TRIMESTER: ICD-10-CM

## 2021-04-12 DIAGNOSIS — Z3A.36 36 WEEKS GESTATION OF PREGNANCY: Primary | ICD-10-CM

## 2021-04-12 PROCEDURE — PNV: Performed by: OBSTETRICS & GYNECOLOGY

## 2021-04-12 NOTE — PROGRESS NOTES
This is a 36 y o   at 36w2d who presents for return OB visit  No complaints other than hemorrhoids  Denies contractions, leakage, bleeding  Endorses fetal movement  BP: 120/64 TWlb    GBS done: Yes  PCN allergy: No  SVE: 1 /-3  NST today: reactive and reassuring  Continue weekly  Labor precautions given  [address] reviewed     F/up 1 wk

## 2021-04-14 LAB — GP B STREP DNA SPEC QL NAA+PROBE: NEGATIVE

## 2021-04-19 ENCOUNTER — ROUTINE PRENATAL (OUTPATIENT)
Dept: OBGYN CLINIC | Facility: CLINIC | Age: 41
End: 2021-04-19
Payer: COMMERCIAL

## 2021-04-19 VITALS — BODY MASS INDEX: 30.76 KG/M2 | SYSTOLIC BLOOD PRESSURE: 118 MMHG | DIASTOLIC BLOOD PRESSURE: 66 MMHG | WEIGHT: 190.6 LBS

## 2021-04-19 DIAGNOSIS — Z3A.37 37 WEEKS GESTATION OF PREGNANCY: ICD-10-CM

## 2021-04-19 DIAGNOSIS — Z34.83 ENCOUNTER FOR SUPERVISION OF OTHER NORMAL PREGNANCY IN THIRD TRIMESTER: Primary | ICD-10-CM

## 2021-04-19 DIAGNOSIS — O09.529 ANTEPARTUM MULTIGRAVIDA OF ADVANCED MATERNAL AGE: ICD-10-CM

## 2021-04-19 PROCEDURE — 59025 FETAL NON-STRESS TEST: CPT | Performed by: OBSTETRICS & GYNECOLOGY

## 2021-04-19 PROCEDURE — PNV: Performed by: OBSTETRICS & GYNECOLOGY

## 2021-04-19 NOTE — PROGRESS NOTES
NST today   Pt has no problems  Would like to defer cervical check     Urine- 1+ protein, glucose negative

## 2021-04-19 NOTE — PROGRESS NOTES
36 y o    female at 38 2 wga EGA for PNV  BP : 118/66  TWlb    Patient presents for return OB visit  Feeling well and has minimal complaints  Denies contractions, vaginal bleeding, leakage of fluid  Reports fetal movement  Advanced maternal age:  NST is performed weekly  NST performed today  Reactive and reassuring  Clear precautions reviewed  Discussed with patient options for delivery including induction of labor at 39 weeks  Patient declined induction of labor at 39 weeks and would prefer for spontaneous labor to occur      SVE declined  Follow-up in 1 week

## 2021-04-21 ENCOUNTER — TELEMEDICINE (OUTPATIENT)
Dept: BEHAVIORAL/MENTAL HEALTH CLINIC | Facility: CLINIC | Age: 41
End: 2021-04-21
Payer: COMMERCIAL

## 2021-04-21 DIAGNOSIS — F41.1 GENERALIZED ANXIETY DISORDER: Primary | ICD-10-CM

## 2021-04-21 PROCEDURE — 90834 PSYTX W PT 45 MINUTES: CPT | Performed by: SOCIAL WORKER

## 2021-04-21 NOTE — PATIENT INSTRUCTIONS
Patient unsure of schedule - will call after baby born if needed      Reminded of lactation and Baby & Me supports available

## 2021-04-21 NOTE — PSYCH
Virtual Brief Visit    Assessment/Plan:    Problem List Items Addressed This Visit     None      Visit Diagnoses     Generalized anxiety disorder    -  Primary                Reason for visit is   Chief Complaint   Patient presents with    Virtual Brief Visit        Encounter provider Lupe Wooten    Provider located at [address] [facility] OB/ [address]  [address] [address] [place]  [phone]    Recent Visits  No visits were found meeting these conditions  Showing recent visits within past 7 days and meeting all other requirements     Today's Visits  Date Type Provider Dept   04/21/21 [address] [place] [address], [facility]   Showing today's visits and meeting all other requirements     Future Appointments  No visits were found meeting these conditions  Showing future appointments within next 150 days and meeting all other requirements        After connecting through telephone and patient was informed that this is not a secure, HIPAA-compliant platform  She agrees to proceed  , the patient was identified by name and date of birth  Va Magdaleno was informed that this is a telemedicine visit and that the visit is being conducted through telephone and patient was informed that this is not a secure, HIPAA-compliant platform  She agrees to proceed     My office door was closed  No one else was in the room  She acknowledged consent and understanding of privacy and security of the platform  The patient has agreed to participate and understands she can discontinue the visit at any time  Patient is aware this is a billable service  Subjective    Va Magdaleno is a 36 y o  female was counseled for 45 minutes from 9:00 - 9:45am - telehealth due to covid - declined video and requested a telephone session  Anshul Corlye is 37 5 weeks pregnant and doing very well - baby moving/pregnancy going well    Declining 39 week induction and hoping to go spontaneously  Feels when having difficulty sleeping, she worries about "everything "  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 and some challenges with that in the past - "think of them as students but then think I don;t know these men, what could have gone wrong, then think about all the things that could have gone wrong "   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  "What if" moments from past and anxiety about this baby due - prepare self for labor, etc   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 so both kids can go to same day care at work - 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  Can utilize [employer] when Tono Breaux is 1yo and take another 12 weeks off  Many positive options and options may create challenges with anxiety  May have ability to put Tono Breaux in day care 1 day a week - unknown now   will be home for 2 days when baby born then will be home from June-August   Some worry about 3 weeks alone with both babies - her family planning to help if needed  Explored PPD prevention - past issues when Tono Breaux born - breastfeeding challenges - got different breast pumps - got [place] from her sister - planning things that will be helpful  Tono Breaux doing well - worries about speech delay - explored normal developmental milestones and if peds not worried, review after 18 months again  Anxiety about end of semester, grading, baby coming - how often to give students a break or allow natural consequences  Semester is over 5/6 and due 5/8  Part of stuff graded, staying very prepared  Also chair of committee for interviewing, etc     Has difficulty not doing her best at work/everything  Learning to say no more now and being comfortable  Utilized CBT methods to work on negating unhelpful thoughts, self care, prioritizing with less guilt and symptom prevention reviewed      HPI     Past Medical History:   Diagnosis Date    Abnormal Pap smear of cervix     Anxiety     HPV (human papilloma virus) infection     Meniere's disease     Vaginal delivery 12/2019    Varicella     had one pox, unsure if immuned       Past Surgical History:   Procedure Laterality Date    CERVICAL BIOPSY  W/ LOOP ELECTRODE EXCISION      2015    TONSILLECTOMY      WISDOM TOOTH EXTRACTION         Current Outpatient Medications   Medication Sig Dispense Refill    docusate sodium (COLACE) 100 mg capsule Take 100 mg by mouth 2 (two) times a day      ferrous sulfate 325 (65 Fe) mg tablet Take 325 mg by mouth daily with breakfast      fexofenadine (ALLEGRA) 180 MG tablet Take 1 tablet (180 mg total) by mouth daily      fluticasone (FLONASE) 50 mcg/act nasal spray 2 sprays into each nostril daily As needed for allergies/congestion 1 Bottle 3    Folic Acid 0 8 MG CAPS Take 1 capsule by mouth daily      meclizine (ANTIVERT) 25 mg tablet Take 1 tablet (25 mg total) by mouth every 8 (eight) hours 30 tablet 0    Prenatal MV & Min w/FA-DHA (PRENATAL ADULT GUMMY/DHA/FA) 0 4-25 MG CHEW Chew 2 tablets daily at bedtime       No current facility-administered medications for this visit  Allergies   Allergen Reactions    Mold Extract [Trichophyton] Sneezing    Other GI Intolerance and Vomiting     Watermellon    Pollen Extract Sneezing       Review of Systems  Oriented, engaged, anxious, full range affect, appropriate speech, denies suicidal/homicidal ideations/psychosis/desire to harm baby, good insight/judgement  Redirecting and consoling Hannah at times - positive interaction    There were no vitals filed for this visit  I spent 45 minutes directly with the patient during this visit    VIRTUAL VISIT DISCLAIMER    Luis San acknowledges that she has consented to an online visit or consultation   She understands that the online visit is based solely on information provided by her, and that, in the absence of a face-to-face physical evaluation by the physician, the diagnosis she receives is both limited and provisional in terms of accuracy and completeness  This is not intended to replace a full medical face-to-face evaluation by the physician  Selene Liu understands and accepts these terms

## 2021-04-26 ENCOUNTER — ROUTINE PRENATAL (OUTPATIENT)
Dept: OBGYN CLINIC | Facility: CLINIC | Age: 41
End: 2021-04-26
Payer: COMMERCIAL

## 2021-04-26 VITALS — SYSTOLIC BLOOD PRESSURE: 120 MMHG | BODY MASS INDEX: 30.83 KG/M2 | DIASTOLIC BLOOD PRESSURE: 74 MMHG | WEIGHT: 191 LBS

## 2021-04-26 DIAGNOSIS — O09.529 ANTEPARTUM MULTIGRAVIDA OF ADVANCED MATERNAL AGE: Primary | ICD-10-CM

## 2021-04-26 DIAGNOSIS — Z3A.38 38 WEEKS GESTATION OF PREGNANCY: ICD-10-CM

## 2021-04-26 DIAGNOSIS — O35.2XX0 HEREDITARY DISEASE IN FAMILY POSSIBLY AFFECTING FETUS, AFFECTING MANAGEMENT OF MOTHER IN PREGNANCY, SINGLE OR UNSPECIFIED FETUS: ICD-10-CM

## 2021-04-26 PROCEDURE — 59025 FETAL NON-STRESS TEST: CPT | Performed by: OBSTETRICS & GYNECOLOGY

## 2021-04-26 PROCEDURE — PNV: Performed by: OBSTETRICS & GYNECOLOGY

## 2021-04-26 NOTE — PROGRESS NOTES
Pt is having pressure and contractions but nothing consistent  Pt feels good, no complaints  Pt would like to defer exam until next week  NST today and neg urine dip

## 2021-04-26 NOTE — PROGRESS NOTES
36 y o    female at 37 4 wga EGA for PNV  BP : 120/74  TWlb    Patient presents for return OB visit  Feeling well and has minimal complaints  Denies contractions, vaginal bleeding, and LOF  Reports daily FM  AMA >41yo  NST performed today  Reactive and reassuring  Continue weekly NSTs until delivery  SVE declined  Labor precautions reviewed  Follow up in 1 week

## 2021-05-03 ENCOUNTER — HOSPITAL ENCOUNTER (INPATIENT)
Facility: HOSPITAL | Age: 41
LOS: 2 days | Discharge: HOME | End: 2021-05-05
Attending: OBSTETRICS & GYNECOLOGY | Admitting: OBSTETRICS & GYNECOLOGY
Payer: COMMERCIAL

## 2021-05-03 ENCOUNTER — TELEPHONE (OUTPATIENT)
Dept: OBGYN CLINIC | Facility: CLINIC | Age: 41
End: 2021-05-03

## 2021-05-03 ENCOUNTER — ANESTHESIA (INPATIENT)
Dept: ANESTHESIOLOGY | Facility: HOSPITAL | Age: 41
End: 2021-05-03
Payer: COMMERCIAL

## 2021-05-03 ENCOUNTER — ANESTHESIA EVENT (INPATIENT)
Dept: ANESTHESIOLOGY | Facility: HOSPITAL | Age: 41
End: 2021-05-03
Payer: COMMERCIAL

## 2021-05-03 DIAGNOSIS — Z3A.39 39 WEEKS GESTATION OF PREGNANCY: Primary | ICD-10-CM

## 2021-05-03 PROBLEM — O42.92 FULL-TERM PREMATURE RUPTURE OF MEMBRANES: Status: ACTIVE | Noted: 2021-05-03

## 2021-05-03 LAB
ABO GROUP BLD: NORMAL
BASE EXCESS BLDCOA CALC-SCNC: -4.2 MMOL/L (ref 3–11)
BASE EXCESS BLDCOV CALC-SCNC: -2.8 MMOL/L (ref 1–9)
BLD GP AB SCN SERPL QL: NEGATIVE
ERYTHROCYTE [DISTWIDTH] IN BLOOD BY AUTOMATED COUNT: 14.7 % (ref 11.6–15.1)
HCO3 BLDCOA-SCNC: 23.8 MMOL/L (ref 17.3–27.3)
HCO3 BLDCOV-SCNC: 22.2 MMOL/L (ref 12.2–28.6)
HCT VFR BLD AUTO: 34.8 % (ref 34.8–46.1)
HGB BLD-MCNC: 11.5 G/DL (ref 11.5–15.4)
MCH RBC QN AUTO: 29 PG (ref 26.8–34.3)
MCHC RBC AUTO-ENTMCNC: 33 G/DL (ref 31.4–37.4)
MCV RBC AUTO: 88 FL (ref 82–98)
O2 CT VFR BLDCOA CALC: 6.5 ML/DL
OXYHGB MFR BLDCOA: 30.3 %
OXYHGB MFR BLDCOV: 65.8 %
PCO2 BLDCOA: 54.7 MM[HG] (ref 30–60)
PCO2 BLDCOV: 39.6 MM HG (ref 27–43)
PH BLDCOA: 7.26 [PH] (ref 7.23–7.43)
PH BLDCOV: 7.37 [PH] (ref 7.19–7.49)
PLATELET # BLD AUTO: 258 THOUSANDS/UL (ref 149–390)
PMV BLD AUTO: 9.1 FL (ref 8.9–12.7)
PO2 BLDCOA: 16.5 MM HG (ref 5–25)
PO2 BLDCOV: 28 MM HG (ref 15–45)
RBC # BLD AUTO: 3.97 MILLION/UL (ref 3.81–5.12)
RH BLD: POSITIVE
SAO2 % BLDCOV: 14.4 ML/DL
SPECIMEN EXPIRATION DATE: NORMAL
WBC # BLD AUTO: 15.91 THOUSAND/UL (ref 4.31–10.16)

## 2021-05-03 PROCEDURE — 86592 SYPHILIS TEST NON-TREP QUAL: CPT | Performed by: OBSTETRICS & GYNECOLOGY

## 2021-05-03 PROCEDURE — 85027 COMPLETE CBC AUTOMATED: CPT | Performed by: OBSTETRICS & GYNECOLOGY

## 2021-05-03 PROCEDURE — 0KQM0ZZ REPAIR PERINEUM MUSCLE, OPEN APPROACH: ICD-10-PCS | Performed by: OBSTETRICS & GYNECOLOGY

## 2021-05-03 PROCEDURE — G0463 HOSPITAL OUTPT CLINIC VISIT: HCPCS

## 2021-05-03 PROCEDURE — 82805 BLOOD GASES W/O2 SATURATION: CPT | Performed by: OBSTETRICS & GYNECOLOGY

## 2021-05-03 PROCEDURE — 86850 RBC ANTIBODY SCREEN: CPT | Performed by: OBSTETRICS & GYNECOLOGY

## 2021-05-03 PROCEDURE — 86901 BLOOD TYPING SEROLOGIC RH(D): CPT | Performed by: OBSTETRICS & GYNECOLOGY

## 2021-05-03 PROCEDURE — 59400 OBSTETRICAL CARE: CPT | Performed by: OBSTETRICS & GYNECOLOGY

## 2021-05-03 PROCEDURE — 86900 BLOOD TYPING SEROLOGIC ABO: CPT | Performed by: OBSTETRICS & GYNECOLOGY

## 2021-05-03 PROCEDURE — 99214 OFFICE O/P EST MOD 30 MIN: CPT

## 2021-05-03 PROCEDURE — NC001 PR NO CHARGE: Performed by: OBSTETRICS & GYNECOLOGY

## 2021-05-03 RX ORDER — SODIUM CHLORIDE, SODIUM LACTATE, POTASSIUM CHLORIDE, CALCIUM CHLORIDE 600; 310; 30; 20 MG/100ML; MG/100ML; MG/100ML; MG/100ML
125 INJECTION, SOLUTION INTRAVENOUS CONTINUOUS
Status: DISCONTINUED | OUTPATIENT
Start: 2021-05-03 | End: 2021-05-04

## 2021-05-03 RX ORDER — ROPIVACAINE HYDROCHLORIDE 2 MG/ML
INJECTION, SOLUTION EPIDURAL; INFILTRATION; PERINEURAL AS NEEDED
Status: DISCONTINUED | OUTPATIENT
Start: 2021-05-03 | End: 2021-05-04 | Stop reason: HOSPADM

## 2021-05-03 RX ORDER — LIDOCAINE HYDROCHLORIDE AND EPINEPHRINE 15; 5 MG/ML; UG/ML
INJECTION, SOLUTION EPIDURAL AS NEEDED
Status: DISCONTINUED | OUTPATIENT
Start: 2021-05-03 | End: 2021-05-03

## 2021-05-03 RX ORDER — ONDANSETRON 2 MG/ML
4 INJECTION INTRAMUSCULAR; INTRAVENOUS EVERY 6 HOURS PRN
Status: DISCONTINUED | OUTPATIENT
Start: 2021-05-03 | End: 2021-05-04

## 2021-05-03 RX ORDER — LIDOCAINE HYDROCHLORIDE AND EPINEPHRINE 15; 5 MG/ML; UG/ML
INJECTION, SOLUTION EPIDURAL
Status: COMPLETED | OUTPATIENT
Start: 2021-05-03 | End: 2021-05-03

## 2021-05-03 RX ORDER — OXYTOCIN/RINGER'S LACTATE 30/500 ML
PLASTIC BAG, INJECTION (ML) INTRAVENOUS
Status: COMPLETED
Start: 2021-05-03 | End: 2021-05-04

## 2021-05-03 RX ADMIN — ROPIVACAINE HYDROCHLORIDE 10 ML: 2 INJECTION, SOLUTION EPIDURAL; INFILTRATION at 18:05

## 2021-05-03 RX ADMIN — LIDOCAINE HYDROCHLORIDE AND EPINEPHRINE 3 ML: 15; 5 INJECTION, SOLUTION EPIDURAL at 18:03

## 2021-05-03 RX ADMIN — SODIUM CHLORIDE, SODIUM LACTATE, POTASSIUM CHLORIDE, AND CALCIUM CHLORIDE 125 ML/HR: .6; .31; .03; .02 INJECTION, SOLUTION INTRAVENOUS at 17:37

## 2021-05-03 RX ADMIN — Medication 250 UNITS: at 21:25

## 2021-05-03 RX ADMIN — SODIUM CHLORIDE, SODIUM LACTATE, POTASSIUM CHLORIDE, AND CALCIUM CHLORIDE 125 ML/HR: .6; .31; .03; .02 INJECTION, SOLUTION INTRAVENOUS at 18:09

## 2021-05-03 RX ADMIN — ROPIVACAINE HYDROCHLORIDE: 2 INJECTION, SOLUTION EPIDURAL; INFILTRATION at 18:05

## 2021-05-03 NOTE — H&P
OB TRIAGE H&P  Selene Liu 36 y o  female MRN: [id number]  Unit/Bed#:  TRIAGE 4       Chief Compliant:   Chief Complaint   Patient presents with    Rupture of Membranes     leakage at 1130    Contractions       A/P:  at 39w2d here for ROM  Fetal testing reassuring  · Admit to L&D  · Expectant management  · GBS (-), no PPx  · Currently 5 cm dilated  · [place] (-), pooling present    Patient seen and examined with Dr Rochelle Hernandez aware  CHRISTOPHE: Estimated Date of Delivery: 21    HPI: 36 y o   at 39w2d with c/o contractions and leaking fluid  She  complains of uterine contractions, occurring every five minutes, large amounts of fluid leaking, and no VB  She states she has felt good FM  Denies acute distress  OB Cx:  submucosal myometrium fibroid, advanced maternal age    [de-identified]:   Vitals:    21 1454   BP: 127/73   Pulse: 90   Resp: 20   Temp: 98 2 °F (36 8 °C)       Physical Exam:  General: AAOX3  No acute distress  Cardiovascular: Regular, Rate and Rhythm, no murmur, gallop or rub   Lungs: Clear to Auscultation Bilaterally, no wheezing, rhonchi or rales   Abdominal: Soft  NT/ND   Gravid  :pooling, no VB, os dilated, copious amounts of mucoid discharge      SVE: 5 / 80% / -2  FHT:  130 / Moderate 6 - 25 bpm / 15x15, accelerations and no decelarations  [place]: q5min      Prenatal labs:  Blood Type:   Lab Results   Component Value Date/Time    ABO Grouping O 10/12/2020 02:14 PM    ABO Grouping O 2019 04:15 AM     , D (Rh type):   Lab Results   Component Value Date/Time    Rh Type Positive 10/12/2020 02:14 PM     , Antibody Screen: No results found for: ANTIBODYSCR , HCT/HGB:   Lab Results   Component Value Date/Time    HCT 32 1 (L) 2021 09:43 AM    Hematocrit 36 0 2019 04:15 AM    Hemoglobin 10 3 (L) 2021 09:43 AM    Hemoglobin 11 7 2019 04:15 AM      , MCV:   Lab Results   Component Value Date/Time    MCV 88 2021 09:43 AM    MCV 90 12/06/2019 04:15 AM      , Platelets:   Lab Results   Component Value Date/Time    Platelet Count 659 76/49/6262 09:43 AM    Platelets 239 40/42/7832 04:15 AM      , 1 hour Glucola:   Lab Results   Component Value Date/Time    Glucose 122 02/24/2021 09:43 AM   , 3 hour GTT: No results found for: NAVKRRB6GO, Varicella: No results found for: VARICELLAIGG    , Rubella: No results found for: RUBELLAIGGQT     , VDRL/RPR:   Lab Results   Component Value Date/Time    RPR Non Reactive 02/24/2021 09:43 AM    RPR Non-Reactive 12/06/2019 04:15 AM      , Urine Culture/Screen:   Lab Results   Component Value Date/Time    Urine Culture 70,000-79,000 cfu/ml Escherichia coli (A) 03/29/2019 06:07 PM        , Group B Strep:    Lab Results   Component Value Date/Time    Strep Grp B YASEMIN Negative 04/12/2021 02:11 PM          ERIC Falk   PGY-1, Family Medicine  05/03/21  3:35 PM

## 2021-05-03 NOTE — PLAN OF CARE
Problem: Knowledge Deficit  Goal: Verbalizes understanding of labor plan  Description: Assess patient/family/caregiver's baseline knowledge level and ability to understand information  Provide education via patient/family/caregiver's preferred learning method at appropriate level of understanding  1  Provide teaching at level of understanding  2  Provide teaching via preferred learning method(s)  Outcome: Progressing  Goal: Patient/family/caregiver demonstrates understanding of disease process, treatment plan, medications, and discharge instructions  Description: Complete learning assessment and assess knowledge base  Interventions:  - Provide teaching at level of understanding  - Provide teaching via preferred learning methods  Outcome: Progressing     Problem: Labor & Delivery  Goal: Manages discomfort  Description: Assess and monitor for signs and symptoms of discomfort  Assess patient's pain level regularly and per hospital policy  Administer medications as ordered  Support use of nonpharmacological methods to help control pain such as distraction, imagery, relaxation, and application of heat and cold  Collaborate with interdisciplinary team and patient to determine appropriate pain management plan  1  Include patient in decisions related to comfort  2  Offer non-pharmacological pain management interventions  3  Report ineffective pain management to physician  Outcome: Progressing  Goal: Patient vital signs are stable  Description: 1  Assess vital signs - vaginal delivery    Outcome: Progressing     Problem: BIRTH - VAGINAL/ SECTION  Goal: Fetal and maternal status remain reassuring during the birth process  Description: INTERVENTIONS:  - Monitor vital signs  - Monitor fetal heart rate  - Monitor uterine activity  - Monitor labor progression (vaginal delivery)  - DVT prophylaxis  - Antibiotic prophylaxis  Outcome: Progressing  Goal: Emotionally satisfying birthing experience for mother/fetus  Description: Interventions:  - Assess, plan, implement and evaluate the nursing care given to the patient in labor  - Advocate the philosophy that each childbirth experience is a unique experience and support the family's chosen level of involvement and control during the labor process   - Actively participate in both the patient's and family's teaching of the birth process  - Consider cultural, Jain and age-specific factors and plan care for the patient in labor  Outcome: Progressing     Problem: PAIN - ADULT  Goal: Verbalizes/displays adequate comfort level or baseline comfort level  Description: Interventions:  - Encourage patient to monitor pain and request assistance  - Assess pain using appropriate pain scale  - Administer analgesics based on type and severity of pain and evaluate response  - Implement non-pharmacological measures as appropriate and evaluate response  - Consider cultural and social influences on pain and pain management  - Notify physician/advanced practitioner if interventions unsuccessful or patient reports new pain  Outcome: Progressing     Problem: SAFETY ADULT  Goal: Patient will remain free of falls  Description: INTERVENTIONS:  - Assess patient frequently for physical needs  -  Identify cognitive and physical deficits and behaviors that affect risk of falls    -  [place] fall precautions as indicated by assessment   - Educate patient/family on patient safety including physical limitations  - Instruct patient to call for assistance with activity based on assessment  - Modify environment to reduce risk of injury  - Consider OT/PT consult to assist with strengthening/mobility  Outcome: Progressing  Goal: Maintain or return to baseline ADL function  Description: INTERVENTIONS:  -  Assess patient's ability to carry out ADLs; assess patient's baseline for ADL function and identify physical deficits which impact ability to perform ADLs (bathing, care of mouth/teeth, toileting, grooming, dressing, etc )  - Assess/evaluate cause of self-care deficits   - Assess range of motion  - Assess patient's mobility; develop plan if impaired  - Assess patient's need for assistive devices and provide as appropriate  - Encourage maximum independence but intervene and supervise when necessary  - Involve family in performance of ADLs  - Assess for home care needs following discharge   - Consider OT consult to assist with ADL evaluation and planning for discharge  - Provide patient education as appropriate  Outcome: Progressing  Goal: Maintain or return mobility status to optimal level  Description: INTERVENTIONS:  - Assess patient's baseline mobility status (ambulation, transfers, stairs, etc )    - Identify cognitive and physical deficits and behaviors that affect mobility  - Identify mobility aids required to assist with transfers and/or ambulation (gait belt, sit-to-stand, lift, walker, cane, etc )  - [place] fall precautions as indicated by assessment  - Record patient progress and toleration of activity level on Mobility SBAR; progress patient to next Phase/Stage  - Instruct patient to call for assistance with activity based on assessment  - Consider rehabilitation consult to assist with strengthening/weightbearing, etc   Outcome: Progressing     Problem: DISCHARGE PLANNING  Goal: Discharge to home or other facility with appropriate resources  Description: INTERVENTIONS:  - Identify barriers to discharge w/patient and caregiver  - Arrange for needed discharge resources and transportation as appropriate  - Identify discharge learning needs (meds, wound care, etc )  - Arrange for interpretive services to assist at discharge as needed  - Refer to Case Management Department for coordinating discharge planning if the patient needs post-hospital services based on physician/advanced practitioner order or complex needs related to functional status, cognitive ability, or social support system  Outcome: Progressing

## 2021-05-03 NOTE — ANESTHESIA PROCEDURE NOTES
Epidural Block    Patient location during procedure: OB  Start time: 5/3/2021 6:01 PM  Reason for block: procedure for pain and at surgeon's request  Staffing  Anesthesiologist: Mary Grace Greene DO  Resident/CRNA: Demetri Holt CRNA  Performed: anesthesiologist   Preanesthetic Checklist  Completed: patient identified, site marked, surgical consent, pre-op evaluation, timeout performed, IV checked, risks and benefits discussed and monitors and equipment checked  Epidural  Patient position: sitting  Prep: ChloraPrep  Patient monitoring: cardiac monitor, frequent blood pressure checks and continuous pulse ox  Approach: midline  Location: lumbar (1-5)  Injection technique: RAQUEL air  Needle  Needle type: Tuohy   Needle gauge: 18 G  Catheter type: side hole  Catheter size: 20 G  Catheter at skin depth: 10 cm  Test dose: negativelidocaine 1 5% with epinephrine 1:200,000 test dose, 3 mLnegative aspiration for CSF, negative aspiration for heme and no paresthesia on injection  patient tolerated the procedure well with no immediate complications

## 2021-05-03 NOTE — OB LABOR/OXYTOCIN SAFETY PROGRESS
Labor Progress Note - Kelsi Carrillo 36 y o  female MRN: [id number]    Unit/Bed#: -01 Encounter: [id number]       Contraction Frequency (minutes): 5  Contraction Quality: Moderate, Strong  Tachysystole: No   Cervical Dilation: 6        Cervical Effacement: 90  Fetal Station: -2  Baseline Rate: 140 bpm  Fetal Heart Rate: 145 BPM  FHR Category: Category I               Vital Signs:   Vitals:    05/03/21 1623   BP: 128/65   Pulse: 82   Resp:    Temp:            Notes/comments:   More uncomfortable with contractions  Making change  Requesting epidural, RN will alert anesthesia  Cat 1 tracing  Continue expectant management       Rigo Leon DO 5/3/2021 5:50 PM

## 2021-05-03 NOTE — OB LABOR/OXYTOCIN SAFETY PROGRESS
Labor Progress Note - Roya Pour 36 y o  female MRN: [id number]    Unit/Bed#: -01 Encounter: [id number]       Contraction Frequency (minutes): 2-4"+irritability  Contraction Quality: Moderate  Tachysystole: No   Cervical Dilation: 7        Cervical Effacement: 90  Fetal Station: -2  Baseline Rate: 130 bpm  Fetal Heart Rate: 120 BPM  FHR Category: Category I               Vital Signs:   Vitals:    05/03/21 1832   BP: 106/58   Pulse: 86   Resp:    Temp:            Notes/comments:    Occasional variables with contractions  Comfortable with epidural  Changed position, placed on peanut ball  Continue expectant management    Yovanny Dugan DO 5/3/2021 7:23 PM

## 2021-05-03 NOTE — TELEPHONE ENCOUNTER
Pt called and stated she is having pressure and contractions and think her water broke  Per Dr Tana Cifuentes, Pt sent to L&D for evaluation  Called L&D to make them aware she is coming

## 2021-05-04 LAB — RPR SER QL: NORMAL

## 2021-05-04 PROCEDURE — 99024 POSTOP FOLLOW-UP VISIT: CPT | Performed by: OBSTETRICS & GYNECOLOGY

## 2021-05-04 RX ORDER — SIMETHICONE 80 MG
80 TABLET,CHEWABLE ORAL 4 TIMES DAILY PRN
Status: DISCONTINUED | OUTPATIENT
Start: 2021-05-04 | End: 2021-05-05 | Stop reason: HOSPADM

## 2021-05-04 RX ORDER — ACETAMINOPHEN 325 MG/1
650 TABLET ORAL EVERY 6 HOURS PRN
Status: DISCONTINUED | OUTPATIENT
Start: 2021-05-04 | End: 2021-05-05 | Stop reason: HOSPADM

## 2021-05-04 RX ORDER — DOCUSATE SODIUM 100 MG/1
100 CAPSULE, LIQUID FILLED ORAL 2 TIMES DAILY
Status: DISCONTINUED | OUTPATIENT
Start: 2021-05-04 | End: 2021-05-05 | Stop reason: HOSPADM

## 2021-05-04 RX ORDER — CALCIUM CARBONATE 200(500)MG
1000 TABLET,CHEWABLE ORAL DAILY PRN
Status: DISCONTINUED | OUTPATIENT
Start: 2021-05-04 | End: 2021-05-05 | Stop reason: HOSPADM

## 2021-05-04 RX ORDER — ONDANSETRON 2 MG/ML
4 INJECTION INTRAMUSCULAR; INTRAVENOUS EVERY 8 HOURS PRN
Status: DISCONTINUED | OUTPATIENT
Start: 2021-05-04 | End: 2021-05-05 | Stop reason: HOSPADM

## 2021-05-04 RX ORDER — DIAPER,BRIEF,INFANT-TODD,DISP
1 EACH MISCELLANEOUS 4 TIMES DAILY PRN
Status: DISCONTINUED | OUTPATIENT
Start: 2021-05-04 | End: 2021-05-05 | Stop reason: HOSPADM

## 2021-05-04 RX ORDER — OXYTOCIN/RINGER'S LACTATE 30/500 ML
250 PLASTIC BAG, INJECTION (ML) INTRAVENOUS CONTINUOUS
Status: ACTIVE | OUTPATIENT
Start: 2021-05-04 | End: 2021-05-04

## 2021-05-04 RX ORDER — IBUPROFEN 600 MG/1
600 TABLET ORAL EVERY 6 HOURS PRN
Status: DISCONTINUED | OUTPATIENT
Start: 2021-05-04 | End: 2021-05-05 | Stop reason: HOSPADM

## 2021-05-04 RX ORDER — DIPHENHYDRAMINE HCL 25 MG
25 TABLET ORAL EVERY 6 HOURS PRN
Status: DISCONTINUED | OUTPATIENT
Start: 2021-05-04 | End: 2021-05-05 | Stop reason: HOSPADM

## 2021-05-04 RX ORDER — MAGNESIUM HYDROXIDE/ALUMINUM HYDROXICE/SIMETHICONE 120; 1200; 1200 MG/30ML; MG/30ML; MG/30ML
15 SUSPENSION ORAL EVERY 6 HOURS PRN
Status: DISCONTINUED | OUTPATIENT
Start: 2021-05-04 | End: 2021-05-05 | Stop reason: HOSPADM

## 2021-05-04 RX ORDER — SENNOSIDES 8.6 MG
1 TABLET ORAL DAILY
Status: DISCONTINUED | OUTPATIENT
Start: 2021-05-04 | End: 2021-05-05 | Stop reason: HOSPADM

## 2021-05-04 RX ADMIN — DOCUSATE SODIUM 100 MG: 100 CAPSULE, LIQUID FILLED ORAL at 08:36

## 2021-05-04 RX ADMIN — SENNOSIDES 8.6 MG: 8.6 TABLET, FILM COATED ORAL at 08:36

## 2021-05-04 RX ADMIN — WITCH HAZEL 1 PAD: 500 SOLUTION RECTAL; TOPICAL at 02:23

## 2021-05-04 RX ADMIN — BENZOCAINE AND LEVOMENTHOL: 200; 5 SPRAY TOPICAL at 02:23

## 2021-05-04 RX ADMIN — IBUPROFEN 600 MG: 600 TABLET, FILM COATED ORAL at 02:23

## 2021-05-04 RX ADMIN — DOCUSATE SODIUM 100 MG: 100 CAPSULE, LIQUID FILLED ORAL at 18:23

## 2021-05-04 RX ADMIN — HYDROCORTISONE 1 APPLICATION: 1 CREAM TOPICAL at 02:23

## 2021-05-04 NOTE — PROGRESS NOTES
Progress Note - OB/GYN   Fernando Castanon 36 y o  female MRN: [id number]  Unit/Bed#: -01 Encounter: [id number]    Assessment:  PPD#1 s/p Spontaneous Vaginal Delivery, stable    Plan:    1) Postpartum care  Encourage ambulation   Encourage breastfeeding  Continue current meds     2) Disposition   Anticipate discharge home tomorrow    Subjective/Objective     Subjective:     Pain: no  Tolerating PO: yes  Voiding: yes  Flatus: yes  BM: yes  Ambulating: yes  Breastfeeding: Breastfeeding  Chest pain: no  Shortness of breath: no  Leg pain: no  Lochia: wnl    Objective:     Vitals:  Vitals:    05/03/21 2304 05/03/21 2319 05/04/21 0013 05/04/21 0438   BP: 112/56 110/63 117/59 104/59   BP Location:   Left arm Left arm   Pulse: 80 90 88 75   Resp:   16 16   Temp:   98 4 °F (36 9 °C) 98 5 °F (36 9 °C)   TempSrc:   Oral Oral   SpO2:   98%    Weight:       Height:           Physical Exam:   GEN: appears well, alert and oriented x 3, pleasant and cooperative   CV: +S1, +S2, no murmurs/rubs/gallops appreciated  RESP: no labored breathing  ABDOMEN: soft, no tenderness, no distention, Uterine fundus firm and non-tender, at the umbilicus   EXTREMITIES: non-tender  NEURO Alert and oriented to person, place, and time         Lab Results   Component Value Date    WBC 15 91 (H) 05/03/2021    HGB 11 5 05/03/2021    HCT 34 8 05/03/2021    MCV 88 05/03/2021     05/03/2021         Emery Wagoner MD, PGY-1  Family Medicine  05/04/21

## 2021-05-04 NOTE — DISCHARGE INSTRUCTIONS
Self Care After Delivery   AMBULATORY CARE:   The postpartum period  is the period of time from delivery to about 6 weeks  During this time you may experience many physical and emotional changes  It is important to understand what is normal and when you need to call your healthcare provider  It is also important to know how to care for yourself during this time  Call your local emergency number (911 in the [address] [facility] [facility]) for any of the following:   · You see or hear things that are not there, or have thoughts of harming yourself or your baby  · You soak through 1 pad in 15 minutes, have blurry vision, clammy or pale skin, and feel faint  · You faint or lose consciousness  · You have trouble breathing  · You cough up blood  · Your  incision comes apart  Seek care immediately if:   · Your heart is beating faster than usual     · You have a bad headache or changes in your vision  · Your episiotomy or  incision is red, swollen, bleeding, or draining pus  · You have severe abdominal pain  Call your doctor or obstetrician if:   · Your leg is painful, red, and larger than usual     · You soak through 1 or more pads in an hour, or pass blood clots larger than a quarter from your vagina  · You have a fever  · You have new or worsening pain in your abdomen or vagina  · You continue to have depression 1 to 2 weeks after you deliver  · You have trouble sleeping  · You have foul-smelling discharge from your vagina  · You have pain or burning when you urinate  · You do not have a bowel movement for 3 days or more  · You have nausea or are vomiting  · You have hard lumps or red streaks over your breasts  · You have cracked nipples or bleed from your nipples  · You have questions or concerns about your condition or care  Physical changes:   The following are normal changes after you give birth:  · Pain in the area between your anus and vagina    · Breast pain    · Constipation or hemorrhoids    · Hot or cold flashes    · Vaginal bleeding or discharge    · Mild to moderate abdominal cramping    · Difficulty controlling bowel movements or urine    Emotional changes:  A drop in hormone levels after you deliver may cause changes in your emotions  You may feel irritable, sad, or anxious  You may cry easily or for no reason  You may also feel depressed  Depression that continues can be a sign of postpartum depression, a condition that can be treated  Treatment may include talk therapy, medicines, or both  Healthcare providers will ask how you are feeling and if you have any depression  These talks can happen during appointments for your medical care and for your baby's care, such as well child visits  Providers can help you find ways to care for yourself and your baby  Talk to your providers about the following:  · When emotional changes or depression started, and if it is getting worse over time    · Problems you are having with daily activities, sleep, or caring for your baby    · If anything makes you feel worse, or makes you feel better    · Feeling that you are not bonding with your baby the way you want    · Any problems your baby has with sleeping or feeding    · Your baby is fussy or cries a lot    · Support you have from friends, family, or others    Breast care for breastfeeding mothers: You may have sore breasts for 3 to 6 days after you give birth  This happens as your milk begins to fill your breasts  You may also have sore breasts if you do not breastfeed frequently  Do the following to care for your breasts:  · Apply a moist, warm, compress to your breast as directed  This may help soothe your breasts  Make sure the washcloth is not too hot before you apply it to your breast     · Nurse your baby or pump your milk frequently  This may prevent clogged milk ducts  Ask your healthcare provider how often to nurse or pump      · Massage your breasts as directed  This may help increase your milk flow  Gently rub your breasts in a circular motion before you breastfeed  You may need to gently squeeze your breast or nipple to help release milk  You can also use a breast pump to help release milk from your breast     · Wash your breasts with warm water only  Do not put soap on your nipples  Soap may cause your nipples to become dry  · Apply lanolin cream to your nipples as directed  Lanolin cream may add moisture to your skin and prevent nipple dryness  Always  wash off lanolin cream with warm water before you breastfeed  · Place pads in your bra  Your nipples may leak milk when you are not breastfeeding  You can place pads inside of your bra to help prevent leaking onto your clothing  Ask your healthcare provider where to purchase bra pads  · Get breastfeeding support if needed  Healthcare providers can answer questions about breastfeeding and provide you with support  Ask your healthcare provider who you can contact if you need breastfeeding support  Breast care for non-breastfeeding mothers:  Milk will fill your breasts even if you bottle feed your baby  Do the following to help stop your milk from filling your breasts and causing pain:  · Wear a bra with support at all times  A sports bra or a tight-fitting bra will help stop your milk from coming in  · Apply ice on each breast for 15 to 20 minutes every hour or as directed  Use an ice pack, or put crushed ice in a plastic bag  Cover it with a towel before you apply it to your breast  Ice helps your milk ducts shrink  · Keep your breasts away from warm water  Warm water will make it easier for milk to fill your breasts  Stand with your breasts away from warm water in the shower  · Limit how much you touch your breasts  This will prevent them from filling with milk  Perineum care: Your perineum is the area between your rectum and vagina   It is normal to have swelling and pain in this area after you give birth  If you had an episiotomy, your healthcare provider may give you special instructions  · Clean your perineum after you use the bathroom  This may prevent infection and help with healing  Use a spray bottle with warm water to clean your perineum  You may also gently spray warm water against your perineum when you urinate  Always wipe front to back  · Take a sitz bath as directed  A sitz bath may help relieve swelling and pain  Fill your bath tub or bucket with water up to your hips and sit in the water  Use cold water for 2 days after you deliver  Then use warm water  Ask your healthcare provider for more information about a sitz bath  · Apply ice packs for the first 24 hours or as directed  Use a plastic glove filled with ice or buy an ice pack  Wrap the ice pack or plastic glove in a small towel or wash cloth  Place the ice pack on your perineum for 20 minutes at a time  · Sit on a donut-shaped pillow  This may relieve pressure on your perineum when you sit  · Use wipes that contain medicine or take pills as directed  Your healthcare provider may tell you to use witch hazel pads  You can place witch hazel pads in the refrigerator before you apply them to your perineum  Your provider may also tell you to take NSAIDs  Ask him or her how often to take pills or use the wipes  · Do not go swimming or take tub baths for 4 to 6 weeks or as directed  This will help prevent an infection in your vagina or uterus  Bowel and bladder care: It may take 3 to 5 days to have a bowel movement after you deliver your baby  You can do the following to prevent or manage constipation, and get control of your bowel or bladder:  · Take stool softeners as directed  A stool softener is medicine that will make your bowel movements softer  This may prevent or relieve constipation  A stool softener may also make bowel movements less painful  · Drink plenty of liquids    Ask how much liquid to drink each day and which liquids are best for you  Liquids may help prevent constipation  · Eat foods high in fiber  Examples include fruits, vegetables, grains, beans, and lentils  Ask your healthcare provider how much fiber you need each day  Fiber may prevent constipation  · Do Kegel exercises as directed  Kegel exercises will help strengthen the muscles that control bowel movements and urination  Ask your healthcare provider for more information on Kegel exercises  · Apply cold compresses or medicine to hemorrhoids as directed  This may relieve swelling and pain  Your healthcare provider may tell you to apply ice or wipes that contain medicine to your hemorrhoids  He or she may also tell you to use a sitz bath  Ask your provider for more information on how to manage hemorrhoids  Nutrition:  Good nutrition is important in the postpartum period  It will help you return to a healthy weight, increase your energy levels, and prevent constipation  It will also help you get enough nutrients and calories if you are going to breastfeed your baby  · Eat a variety of healthy foods  Healthy foods include fruits, vegetables, whole-grain breads, low-fat dairy products, beans, lean meats, and fish  You may need 500 to 700 extra calories each day if you breastfeed your baby  You may also need extra protein  · Limit foods with added sugar and high amounts of fat  These foods are high in calories and low in healthy nutrients  Read food labels so you know how much sugar and fat is in the food you want to eat  · Drink 8 to 10 glasses of water per day  Water will help you make plenty of milk for your baby  It will also help prevent constipation  Drink a glass of water every time you breastfeed your baby  · Take vitamins as directed  Ask your healthcare provider what vitamins you need  · Limit caffeine and alcohol if you are breastfeeding    Caffeine and alcohol can get into your breast milk  Caffeine and alcohol can make your baby fussy  They can also interfere with your baby's sleep  Ask your healthcare provider if you can drink alcohol or caffeine  Rest and sleep: You may feel very tired in the postpartum period  Enough sleep will help you heal and give you energy to care for your baby  The following may help you get sleep and rest:  · Nap when your baby naps  Your baby may nap several times during the day  Get rest during this time  · Limit visitors  Many people may want to see you and your baby  Ask friends or family to visit on different days  This will give you time to rest     · Do not plan too much for one day  Put off household chores so that you have time to rest  Gradually do more each day  · Ask for help from family, friends, or neighbors  Ask them to help you with laundry, cleaning, or errands  Also ask someone to watch the baby while you take a nap or relax  Ask your partner to help with the care of your baby  Pump some of your breast milk so your partner can feed your baby during the night  Exercise after delivery:  Wait until your healthcare provider says it is okay to exercise  Exercise can help you lose weight, increase your energy levels, and manage your mood  It can also prevent constipation and blood clots  Start with gentle exercises such as walking  Do more as you have more energy  You may need to avoid abdominal exercises for 1 to 2 weeks after you deliver  Talk to your healthcare provider about an exercise plan that is right for you  Sexual activity after delivery:   · Do not have sex until your healthcare provider says it is okay  You may need to wait 4 to 6 weeks before you have sex  This may prevent infection and allow time to heal     · Your menstrual cycle may begin as soon as 3 weeks after you deliver  Your period may be delayed if you breastfeed your baby  You can become pregnant before you get your first postpartum period   Talk to your healthcare provider about birth control that is right for you  Some types of birth control are not safe during breastfeeding  For support and more information:  Join a support group for new mothers  Ask for help from family and friends with chores, errands, and care of your baby  · Office of Women's Health,  Department of Health and Human Services  [address], [address] [facility] [place]  [address]  [address], [address] [facility] [place]  [address]  Phone: 3- 138 [phone]  Web Address: www womenshealth gov  ·  [facility] Postpartum [address] [facility] , [address]  [address] [facility] , [address] [facility]  Web Address: [email or web address]  Follow up with your doctor or obstetrician as directed: You will need to follow up within 2 to 6 weeks of delivery  Write down your questions so you remember to ask them at your visits  © Copyright [facility] [address] Information is for End User's use only and may not be sold, redistributed or otherwise used for commercial purposes  All illustrations and images included in CareNotes® are the copyrighted property of A D A M , Inc  or [address] [place] [address] [facility]  The above information is an  only  It is not intended as medical advice for individual conditions or treatments  Talk to your doctor, nurse or pharmacist before following any medical regimen to see if it is safe and effective for you  Caring for your [place] during the COVID-19 Outbreak     How to safely hold and care for your :  Direct care of your , including feeding and changing the diaper, should be provided by a healthy adult without suspected or confirmed COVID-19  Anyone touching your  must wash their hands before and after touching your      The following people should remain six (6) feet away from your :  · Anyone who is self-monitoring for COVID-19   · Anyone under quarantine for COVID-19 exposure   · Anyone with suspected COVID-19   · Anyone with confirmed COVID19   · If any person listed above must come within six (6) feet of your , they should wear a mask which covers their nose and mouth  Anyone using a mask must wash their hands before putting on the mask, after touching or adjusting a mask on their face, and after taking the mask off  Anyone who holds your  should wear a clean shirt  This helps decrease the risk of the  contacting fabric that may contain respiratory secretions from coughing or sneezing  Can someone touch or hold my  if they had COVID-23 in the past?  If someone has recovered from COVID-19, they may touch or hold your  if ALL of the following are true:   They have not taken any fever-reducing medications for the last 72 hours, and   They have not had a fever (100 4 or greater) in the last 72 hours, and    It has been at least seven (7) days since they first noticed symptoms, and    They are wearing a mask while touching or holding your , and   They wash their hands before and after touching or holding your   How to recognize signs of infection in your :   Even in the best of circumstances, it is still possible for your  to become infected  Contact your pediatrician if your  has ANY of the following:   fever greater than 100 degrees F   trouble breathing   nasal congestion   · retractions (tightening of the skin against the ribs during breathing)     How to recognize signs of infection in your family:  If anyone in your home has symptoms such as fever (100 4 or greater), cough, or shortness of breath, or if you have any questions about discontinuing isolation precautions, please contact your obstetrician, your primary care provider, or your local Department of Health     If you are instructed to go to a doctors office or the emergency room, please call ahead (or have your pediatrician notify the emergency department) and let the office or hospital know in advance about COVID-related concerns  This will help the health care workers prepare for your arrival      Providing Milk for your  if you have Suspected or Confirmed COVID-19    Is COVID-19 found in breastmilk? Evidence suggests that COVID-19 is NOT found in breastmilk  Women with COVID-19 are encouraged to breastfeed as described below  It is thought that antibodies to COVID-19 are present in the breastmilk of women who have been infected with COVID-19  Antibodies are protective substances that help fight the virus  Breastfeeding allows these antibodies to be transferred to your   This is one of the many benefits of breastfeeding  How to safely breastfeed your :  If feeding at the breast, the following steps can decrease the risk of spread of infection to your :    Wear a mask over your nose and mouth  If you do not have a mask, consider using a scarf or other fabric  Alicia Leisure Wash your hands before putting on your mask, after touching or adjusting your mask, and after taking the mask off   Wash your hands before and after feeding your    Wear a clean shirt  This helps decrease the risk of the  contacting fabric that may contain respiratory secretions from coughing or sneezing  How to safely pump or express breastmilk: Follow all recommendations for hand washing, wearing a mask, and wearing a clean shirt as you would for other contact with your   Wash your hands with warm soapy water or an alcohol-based hand  before touching your pump equipment or starting to pump  Clean the outside of the breast pump before and after use   Wash the kit with warm, soapy water, rinse with clean water, and allow to air-dry   Keep the equipment away from dirty dishes or areas where family members might touch the pieces  Sanitize your kit at least once per day  You may use a microwave steam bag, boiling water in a pot on the stove, or a  on the [employer]cycle  Do not cough or sneeze on the breast pump collection kit or the milk storage containers  Please follow all  recommendations for cleaning the pump and sanitizing/sterilizing the bottles and nipples

## 2021-05-04 NOTE — ANESTHESIA PREPROCEDURE EVALUATION
Procedure:  LABOR ANALGESIA    Relevant Problems   GYN   (+) 38 weeks gestation of pregnancy   (+) 39 weeks gestation of pregnancy   (+) Antepartum multigravida of advanced maternal age   (+) Supervision of normal pregnancy        Physical Exam    Airway    Mallampati score: II  TM Distance: >3 FB  Neck ROM: full     Dental   No notable dental hx     Cardiovascular  Cardiovascular exam normal    Pulmonary  Pulmonary exam normal     Other Findings        Anesthesia Plan  ASA Score- 2     Anesthesia Type- epidural with ASA Monitors  Additional Monitors:   Airway Plan:           Plan Factors-Exercise tolerance (METS): >4 METS  Chart reviewed  Existing labs reviewed  Patient is not a current smoker  Patient not instructed to abstain from smoking on day of procedure  Patient did not smoke on day of surgery  Induction-     Postoperative Plan-     Informed Consent- Anesthetic plan and risks discussed with patient  I personally reviewed this patient with the CRNA  Discussed and agreed on the Anesthesia Plan with the CRNA  Sulema Jane

## 2021-05-04 NOTE — PLAN OF CARE
Problem: Knowledge Deficit  Goal: Verbalizes understanding of labor plan  Description: Assess patient/family/caregiver's baseline knowledge level and ability to understand information  Provide education via patient/family/caregiver's preferred learning method at appropriate level of understanding  1  Provide teaching at level of understanding  2  Provide teaching via preferred learning method(s)  5/3/2021 2352 by Benito Mcdermott RN  Outcome: Progressing  5/3/2021 1659 by Benito Mcdermott RN  Outcome: Progressing  Goal: Patient/family/caregiver demonstrates understanding of disease process, treatment plan, medications, and discharge instructions  Description: Complete learning assessment and assess knowledge base  Interventions:  - Provide teaching at level of understanding  - Provide teaching via preferred learning methods  5/3/2021 2352 by Benito Mcdermott RN  Outcome: Progressing  5/3/2021 1659 by Benito Mcdermott RN  Outcome: Progressing     Problem: Labor & Delivery  Goal: Manages discomfort  Description: Assess and monitor for signs and symptoms of discomfort  Assess patient's pain level regularly and per hospital policy  Administer medications as ordered  Support use of nonpharmacological methods to help control pain such as distraction, imagery, relaxation, and application of heat and cold  Collaborate with interdisciplinary team and patient to determine appropriate pain management plan  1  Include patient in decisions related to comfort  2  Offer non-pharmacological pain management interventions  3  Report ineffective pain management to physician  5/3/2021 2352 by Benito Mcdermott RN  Outcome: Completed  5/3/2021 1659 by Benito Mcdermott RN  Outcome: Progressing  Goal: Patient vital signs are stable  Description: 1  Assess vital signs - vaginal delivery    5/3/2021 2352 by Benito Mcdermott RN  Outcome: Completed  5/3/2021 1659 by Benito Mcdermott RN  Outcome: Progressing     Problem: Labor & Delivery  Goal: Manages discomfort  Description: Assess and monitor for signs and symptoms of discomfort  Assess patient's pain level regularly and per hospital policy  Administer medications as ordered  Support use of nonpharmacological methods to help control pain such as distraction, imagery, relaxation, and application of heat and cold  Collaborate with interdisciplinary team and patient to determine appropriate pain management plan  1  Include patient in decisions related to comfort  2  Offer non-pharmacological pain management interventions  3  Report ineffective pain management to physician  5/3/2021 2352 by Michael Cintron RN  Outcome: Completed  5/3/2021 1659 by Michael Cintron RN  Outcome: Progressing  Goal: Patient vital signs are stable  Description: 1  Assess vital signs - vaginal delivery  5/3/2021 2352 by Michael Cintron RN  Outcome: Completed  5/3/2021 1659 by Michael Cintron RN  Outcome: Progressing     Problem: PAIN - ADULT  Goal: Verbalizes/displays adequate comfort level or baseline comfort level  Description: Interventions:  - Encourage patient to monitor pain and request assistance  - Assess pain using appropriate pain scale  - Administer analgesics based on type and severity of pain and evaluate response  - Implement non-pharmacological measures as appropriate and evaluate response  - Consider cultural and social influences on pain and pain management  - Notify physician/advanced practitioner if interventions unsuccessful or patient reports new pain  5/3/2021 2352 by Michael Cintron RN  Outcome: Progressing  5/3/2021 [address] by Michael Cintron RN  Outcome: Progressing     Problem: SAFETY ADULT  Goal: Patient will remain free of falls  Description: INTERVENTIONS:  - Assess patient frequently for physical needs  -  Identify cognitive and physical deficits and behaviors that affect risk of falls    -  [place] fall precautions as indicated by assessment   - Educate patient/family on patient safety including physical limitations  - Instruct patient to call for assistance with activity based on assessment  - Modify environment to reduce risk of injury  - Consider OT/PT consult to assist with strengthening/mobility  5/3/2021 2352 by Marisa Green RN  Outcome: Progressing  5/3/2021 1659 by Marisa Green RN  Outcome: Progressing  Goal: Maintain or return to baseline ADL function  Description: INTERVENTIONS:  -  Assess patient's ability to carry out ADLs; assess patient's baseline for ADL function and identify physical deficits which impact ability to perform ADLs (bathing, care of mouth/teeth, toileting, grooming, dressing, etc )  - Assess/evaluate cause of self-care deficits   - Assess range of motion  - Assess patient's mobility; develop plan if impaired  - Assess patient's need for assistive devices and provide as appropriate  - Encourage maximum independence but intervene and supervise when necessary  - Involve family in performance of ADLs  - Assess for home care needs following discharge   - Consider OT consult to assist with ADL evaluation and planning for discharge  - Provide patient education as appropriate  5/3/2021 2352 by Marisa Green RN  Outcome: Progressing  5/3/2021 1659 by Marisa Green RN  Outcome: Progressing  Goal: Maintain or return mobility status to optimal level  Description: INTERVENTIONS:  - Assess patient's baseline mobility status (ambulation, transfers, stairs, etc )    - Identify cognitive and physical deficits and behaviors that affect mobility  - Identify mobility aids required to assist with transfers and/or ambulation (gait belt, sit-to-stand, lift, walker, cane, etc )  - [place] fall precautions as indicated by assessment  - Record patient progress and toleration of activity level on Mobility SBAR; progress patient to next Phase/Stage  - Instruct patient to call for assistance with activity based on assessment  - Consider rehabilitation consult to assist with strengthening/weightbearing, etc   5/3/2021 2352 by Juan Diego Eagle RN  Outcome: Progressing  5/3/2021 1659 by Juan Diego Eagle RN  Outcome: Progressing     Problem: DISCHARGE PLANNING  Goal: Discharge to home or other facility with appropriate resources  Description: INTERVENTIONS:  - Identify barriers to discharge w/patient and caregiver  - Arrange for needed discharge resources and transportation as appropriate  - Identify discharge learning needs (meds, wound care, etc )  - Arrange for interpretive services to assist at discharge as needed  - Refer to Case Management Department for coordinating discharge planning if the patient needs post-hospital services based on physician/advanced practitioner order or complex needs related to functional status, cognitive ability, or social support system  5/3/2021 2352 by Juan Diego Eagle RN  Outcome: Progressing  5/3/2021 [address] by Juan Diego Eagle RN  Outcome: Progressing

## 2021-05-04 NOTE — OB LABOR/OXYTOCIN SAFETY PROGRESS
Labor Progress Note - Fernando Castanon 36 y o  female MRN: [id number]    Unit/Bed#: -01 Encounter: [id number]       Contraction Frequency (minutes): 2-4"+irritability  Contraction Quality: Moderate  Tachysystole: No   Cervical Dilation: 9        Cervical Effacement: 90  Fetal Station: -1  Baseline Rate: 124 bpm  Fetal Heart Rate: 130 BPM  FHR Category: Category II               Vital Signs:   Vitals:    05/03/21 1950   BP: 102/52   Pulse: 75   Resp:    Temp:            Notes/comments:    Patient called out feeling pressure  9 cm - attempted pushing with a few contractions but unable to completely reduce cervix  Baby tolerated well    Continue to labor, will recheck in 30 min  Straight cath now to empty bladder    Letha Temple DO 5/3/2021 8:20 PM

## 2021-05-04 NOTE — LACTATION NOTE
This note was copied from a baby's chart  CONSULT - LACTATION  Baby Girl Lilia Vuong 1 days female MRN: [id number]    [facility] Bed: (N)/(N) Encounter: [id number]    Maternal Information     MOTHER:  Alvaro Dominguez  Maternal Age: 36 y o    OB History: # 1 - Date: 11/2018, Sex: None, Weight: None, GA: 8w0d, Delivery: None, Apgar1: None, Apgar5: None, Living: None, Birth Comments: None    # 2 - Date: 12/06/19, Sex: Female, Weight: 3232 g (7 lb 2 oz), GA: 39w5d, Delivery: Vaginal, Spontaneous, Apgar1: 9, Apgar5: 9, Living: Living, Birth Comments: None    # 3 - Date: 05/03/21, Sex: Female, Weight: 3657 g (8 lb 1 oz), GA: 39w2d, Delivery: Vaginal, Spontaneous, Apgar1: 9, Apgar5: 9, Living: Living, Birth Comments: loose nuchal cord x1 reduced before delivery   Previouse breast reduction surgery? No    Lactation history:   Has patient previously breast fed: Yes   How long had patient previously breast fed: 9 months   Previous breast feeding complications: Breast/nipple pain     Past Surgical History:   Procedure Laterality Date    CERVICAL BIOPSY  W/ LOOP ELECTRODE EXCISION      2015    TONSILLECTOMY      WISDOM TOOTH EXTRACTION          Birth information:  YOB: 2021   Time of birth: 7:25 PM   Sex: female   Delivery type: Vaginal, Spontaneous   Birth Weight: 3657 g (8 lb 1 oz)   Percent of Weight Change: 0%     Gestational Age: 44w2d     Feeding recommendations:  breast feed on demand      Nydia was sleeping at time of assessment  Adrián Ireland c/o tender nipples without and abrasions or lesions  Says she knows how to hand express  Adrián Ireland has [place] breast pump she purchased  Has SS2 and Medela from first baby  Does not desire new pump this time  History of mastitis 3x with first child who  for 9 months  Discussed 2nd night syndrome and ways to calm infant  Hand out given  Information on hand expression given   Discussed benefits of knowing how to manually express breast including stimulating milk supply, softening nipple for latch and evacuating breast in the event of engorgement  Met with mother  Provided mother with Ready, Set, Baby booklet  Discussed Skin to Skin contact an benefits to mom and baby  Talked about the delay of the first bath until baby has adjusted  Spoke about the benefits of rooming in  Feeding on cue and what that means for recognizing infant's hunger  Avoidance of pacifiers for the first month discussed  Talked about exclusive breastfeeding for the first 6 months  Positioning and latch reviewed as well as showing images of other feeding positions  Discussed the properties of a good latch in any position  Reviewed hand/manual expression  Discussed s/s that baby is getting enough milk and some s/s that breastfeeding dyad may need further help  Gave information on common concerns, what to expect the first few weeks after delivery, preparing for other caregivers, and how partners can help  Resources for support also provided  Encouraged parents to call for assistance, questions, and concerns about breastfeeding  Extension provided      Greta Whitmore RN 5/4/2021 5:31 PM

## 2021-05-04 NOTE — ANESTHESIA POSTPROCEDURE EVALUATION
Post-Op Assessment Note    CV Status:  Stable    Pain management: adequate     Mental Status:  Alert and awake   Hydration Status:  Stable   PONV Controlled:  Controlled   Airway Patency:  Patent      Post Op Vitals Reviewed: Yes      Staff: CRNA   Comments: Epidural catheter removed without difficulty, tip intact, site clean    Post-op block assessment: catheter intact and no complications      No complications documented      BP      Temp      Pulse     Resp      SpO2

## 2021-05-04 NOTE — PLAN OF CARE
Problem: Knowledge Deficit  Goal: Verbalizes understanding of labor plan  Description: Assess patient/family/caregiver's baseline knowledge level and ability to understand information  Provide education via patient/family/caregiver's preferred learning method at appropriate level of understanding  1  Provide teaching at level of understanding  2  Provide teaching via preferred learning method(s)  Outcome: Progressing  Goal: Patient/family/caregiver demonstrates understanding of disease process, treatment plan, medications, and discharge instructions  Description: Complete learning assessment and assess knowledge base  Interventions:  - Provide teaching at level of understanding  - Provide teaching via preferred learning methods  Outcome: Progressing     Problem: PAIN - ADULT  Goal: Verbalizes/displays adequate comfort level or baseline comfort level  Description: Interventions:  - Encourage patient to monitor pain and request assistance  - Assess pain using appropriate pain scale  - Administer analgesics based on type and severity of pain and evaluate response  - Implement non-pharmacological measures as appropriate and evaluate response  - Consider cultural and social influences on pain and pain management  - Notify physician/advanced practitioner if interventions unsuccessful or patient reports new pain  Outcome: Progressing     Problem: SAFETY ADULT  Goal: Patient will remain free of falls  Description: INTERVENTIONS:  - Assess patient frequently for physical needs  -  Identify cognitive and physical deficits and behaviors that affect risk of falls    -  [place] fall precautions as indicated by assessment   - Educate patient/family on patient safety including physical limitations  - Instruct patient to call for assistance with activity based on assessment  - Modify environment to reduce risk of injury  - Consider OT/PT consult to assist with strengthening/mobility  Outcome: Progressing  Goal: Maintain or return to baseline ADL function  Description: INTERVENTIONS:  -  Assess patient's ability to carry out ADLs; assess patient's baseline for ADL function and identify physical deficits which impact ability to perform ADLs (bathing, care of mouth/teeth, toileting, grooming, dressing, etc )  - Assess/evaluate cause of self-care deficits   - Assess range of motion  - Assess patient's mobility; develop plan if impaired  - Assess patient's need for assistive devices and provide as appropriate  - Encourage maximum independence but intervene and supervise when necessary  - Involve family in performance of ADLs  - Assess for home care needs following discharge   - Consider OT consult to assist with ADL evaluation and planning for discharge  - Provide patient education as appropriate  Outcome: Progressing  Goal: Maintain or return mobility status to optimal level  Description: INTERVENTIONS:  - Assess patient's baseline mobility status (ambulation, transfers, stairs, etc )    - Identify cognitive and physical deficits and behaviors that affect mobility  - Identify mobility aids required to assist with transfers and/or ambulation (gait belt, sit-to-stand, lift, walker, cane, etc )  - [place] fall precautions as indicated by assessment  - Record patient progress and toleration of activity level on Mobility SBAR; progress patient to next Phase/Stage  - Instruct patient to call for assistance with activity based on assessment  - Consider rehabilitation consult to assist with strengthening/weightbearing, etc   Outcome: Progressing     Problem: DISCHARGE PLANNING  Goal: Discharge to home or other facility with appropriate resources  Description: INTERVENTIONS:  - Identify barriers to discharge w/patient and caregiver  - Arrange for needed discharge resources and transportation as appropriate  - Identify discharge learning needs (meds, wound care, etc )  - Arrange for interpretive services to assist at discharge as needed  - Refer to Case Management Department for coordinating discharge planning if the patient needs post-hospital services based on physician/advanced practitioner order or complex needs related to functional status, cognitive ability, or social support system  Outcome: Progressing

## 2021-05-04 NOTE — L&D DELIVERY NOTE
Vaginal Delivery Summary - OB/GYN   Amanda Blair 36 y o  female MRN: [id number]  Unit/Bed#: -01 Encounter: [id number]          Predelivery Diagnosis:  1  Pregnancy at 39w2d wks for PROM  2  AMA    Postdelivery Diagnosis:  1  Same as above  2  Delivery of term     Procedure: normal spontaneous vaginal delivery    Attending: Tk Forman    Resident: none    Anesthesia: epidural    Complications: None    Specimens: cord blood, arterial and venous cord blood gasses, placenta (storage)    Cord Gas:   Recent Results (from the past 1 hour(s))   Blood gas, arterial, cord    Collection Time: 21  9:26 PM   Result Value Ref Range    pH, Cord Art 7 256 7 230 - 7 430    pCO2, Cord Art 54 7 30 0 - 60 0    pO2, Cord Art 16 5 5 0 - 25 0 mm HG    HCO3, Cord Art 23 8 17 3 - 27 3 mmol/L    Base Exc, Cord Art -4 2 (L) 3 0 - 11 0 mmol/L    O2 Content, Cord Art 6 5 ml/dl    O2 Hgb, Arterial Cord 30 3 %   Blood gas, venous, cord    Collection Time: 21  9:26 PM   Result Value Ref Range    pH, Cord Kishore 7 366 7 190 - 7 490    pCO2, Cord Kishore 39 6 27 0 - 43 0 mm HG    pO2, Cord Kishore 28 0 15 0 - 45 0 mm HG    HCO3, Cord Kishore 22 2 12 2 - 28 6 mmol/L    Base Exc, Cord Kishore -2 8 (L) 1 0 - 9 0 mmol/L    O2 Cont, Cord Kishore 14 4 mL/dL    O2 HGB,VENOUS CORD 65 8 %       Findings:   1  Viable female at , with APGARS of 9 and 9 at 1 and 5 minutes respectively, Weight not available at time of dictation  2  Intact placenta with 3VC at   3  2 degree laceration repaired with 3 0 vicryl on a CT-1 needle  Disposition:  Patient tolerated the procedure well and was recovering in labor and delivery room     Brief history and labor course:  Ms Amanda Blair is a 36 y o  , now [phone], who presented at 39wks 2 days with prelabor rupture of membranes  She was admitted to L&D and managed expectantly  She made cervical change without augmentation   She underwent epidural anesthesia and progressed to complete dilation with reassuring fetal heart tones  Description of procedure    After pushing for 22 minutes, the patient delivered a viable female  at   The fetal vertex delivered spontaneously  There was evidence for nuchal cord x 1 that was loose and easily reduced  The anterior shoulder delivered atraumatically with maternal expulsive forces and the assistance of gentle steady downward guidance  The posterior shoulder delivered with maternal expulsive forces and the assistance of gentle steady upward guidance followed by the remainder of the infant body  Upon delivery, the infant was placed on the mothers abdomen and the cord was clamped and cut after a 30-60 second delay  Awaiting nurse resuscitators evaluated the   Arterial and venous cord blood gases and cord blood was collected for analysis  These were promptly sent to the lab  In the immediate post-partum, 30 units of IV pitocin was administered, and the uterus was noted to contract down well with massage and pitocin  The placenta delivered spontaneously at  and was noted to have a centrally inserted 3 vessel cord  The uterus was massaged until firm and the lower uterine segment was cleared of all clot and debris  The vagina, cervix, perineum, and rectum were inspected and there was noted to be a 2nd degree perineal laceration  The laceration was repaired in the usual fashion using 3-0 Vicryl  At the conclusion of the procedure, all needle, sponge, and instrument counts were noted to be correct  Patient tolerated the procedure well  I was present and participated in all key portions of the case

## 2021-05-04 NOTE — DISCHARGE SUMMARY
Discharge Summary - OB/GYN  Agata Cai 36 y o  female MRN: [id number]  Unit/Bed#: -01 Encounter: [id number]    Admission Date: 5/3/2021     Discharge Date: 2021    Admitting Attending: Sascha Ovalle DO    Delivering Attending: Dr Debra Gallego    Discharging Attending: Dr Darin Bro    Diagnosis:   1  Pregnancy at 39w2d  2  AMA    Procedures: spontaneous vaginal delivery    Anesthesia: epidural    Hospital course: Agata Cai is a 36 y o   at 44 W 2D who was admitted for spontaneous rupture of membranes  Her admission labs were WNL  She was initially 5/80/-2 with FHT baseline 130, moderate variability, positive accelerations, no decelerations, and contractions Q 5 minutes  She was managed expectantly for labor  She received an epidural for pain control  Her FHT showed moderate variability throughout the course of labor  She delivered a viable female  on 05/3/2021 at 2122  Weight 8 lbs 1 oz via normal spontaneous vaginal delivery  She sustained a second-degree laceration during delivery which was adequately repaired  Apgars were 9 (1 min) and 9 (5 min)   was transferred to  nursery  Patient tolerated the procedure well  Her post-delivery course had no complications  Her postpartum pain was well controlled  On day of discharge, she was ambulating and able to reasonably perform all ADLs  She was voiding and had appropriate bowel function  Pain was well controlled  She was discharged home on postpartum day #2 without complications  Patient was instructed to follow up with her OB as an outpatient and was given appropriate warnings to call provider if she develops signs of infection or uncontrolled pain  Complications: none apparent    Condition at discharge: good     Discharge instructions/medications/information to patient and family:   See after visit summary for information provided to patient and family        Provisions for Follow-Up Care:  See after visit summary for information related to follow-up care and any pertinent home health orders  Disposition: See After Visit Summary for discharge disposition information      Planned Readmission: No

## 2021-05-05 VITALS
OXYGEN SATURATION: 98 % | RESPIRATION RATE: 18 BRPM | TEMPERATURE: 98.2 F | WEIGHT: 191 LBS | DIASTOLIC BLOOD PRESSURE: 60 MMHG | HEIGHT: 66 IN | SYSTOLIC BLOOD PRESSURE: 116 MMHG | HEART RATE: 74 BPM | BODY MASS INDEX: 30.7 KG/M2

## 2021-05-05 PROCEDURE — 99024 POSTOP FOLLOW-UP VISIT: CPT | Performed by: OBSTETRICS & GYNECOLOGY

## 2021-05-05 RX ORDER — IBUPROFEN 200 MG
600 TABLET ORAL EVERY 6 HOURS PRN
Start: 2021-05-05 | End: 2021-09-17 | Stop reason: ALTCHOICE

## 2021-05-05 RX ORDER — ACETAMINOPHEN 325 MG/1
650 TABLET ORAL EVERY 6 HOURS PRN
Start: 2021-05-05 | End: 2021-09-17 | Stop reason: ALTCHOICE

## 2021-05-05 RX ADMIN — SENNOSIDES 8.6 MG: 8.6 TABLET, FILM COATED ORAL at 09:04

## 2021-05-05 RX ADMIN — DOCUSATE SODIUM 100 MG: 100 CAPSULE, LIQUID FILLED ORAL at 09:04

## 2021-05-05 NOTE — PROGRESS NOTES
Progress Note - OB/GYN   Thor Almonte 36 y o  female MRN: [id number]  Unit/Bed#: -01 Encounter: [id number]    Assessment:  PPD#2 s/p Spontaneous Vaginal Delivery, stable    Plan:    1) Postpartum care  Encourage ambulation   Encourage breastfeeding  Continue current meds     2) Disposition   Anticipate discharge home today    Subjective/Objective     Subjective:     Pain: no  Tolerating PO: yes  Voiding: yes  Flatus: yes  BM: yes  Ambulating: yes  Breastfeeding: Breastfeeding  Chest pain: no  Shortness of breath: no  Leg pain: no  Lochia: wnl    Objective:     Vitals:  Vitals:    05/04/21 1223 05/04/21 1719 05/04/21 2008 05/05/21 0000   BP: 102/58 113/58 127/60 114/59   BP Location: Left arm Left arm Right arm Left arm   Pulse: 83 91 85 73   Resp: 18 18 18 18   Temp: 98 1 °F (36 7 °C) 98 1 °F (36 7 °C) 98 4 °F (36 9 °C) 98 1 °F (36 7 °C)   TempSrc: Oral Oral Oral Oral   SpO2:  98% 98%    Weight:       Height:           Physical Exam:   GEN: appears well, alert and oriented x 3, pleasant and cooperative   CV: +S1, +S2, no murmurs/rubs/gallops appreciated  RESP: no labored breathing  ABDOMEN: soft, no tenderness, no distention, Uterine fundus firm and non-tender, at the umbilicus   EXTREMITIES: non-tender  NEURO Alert and oriented to person, place, and time         Lab Results   Component Value Date    WBC 15 91 (H) 05/03/2021    HGB 11 5 05/03/2021    HCT 34 8 05/03/2021    MCV 88 05/03/2021     05/03/2021         Rafael Hall MD, PGY-1  Family Medicine  05/05/21

## 2021-05-05 NOTE — PLAN OF CARE
Problem: PAIN - ADULT  Goal: Verbalizes/displays adequate comfort level or baseline comfort level  Description: Interventions:  - Encourage patient to monitor pain and request assistance  - Assess pain using appropriate pain scale  - Administer analgesics based on type and severity of pain and evaluate response  - Implement non-pharmacological measures as appropriate and evaluate response  - Consider cultural and social influences on pain and pain management  - Notify physician/advanced practitioner if interventions unsuccessful or patient reports new pain  5/4/2021 2335 by Gifford Dancer, RN  Outcome: Progressing  5/4/2021 2334 by Gifford Dancer, RN  Outcome: Progressing     Problem: SAFETY ADULT  Goal: Patient will remain free of falls  Description: INTERVENTIONS:  - Assess patient frequently for physical needs  -  Identify cognitive and physical deficits and behaviors that affect risk of falls    -  [place] fall precautions as indicated by assessment   - Educate patient/family on patient safety including physical limitations  - Instruct patient to call for assistance with activity based on assessment  - Modify environment to reduce risk of injury  - Consider OT/PT consult to assist with strengthening/mobility  5/4/2021 2335 by Gifford Dancer, RN  Outcome: Progressing  5/4/2021 2334 by Gifford Dancer, RN  Outcome: Progressing  Goal: Maintain or return to baseline ADL function  Description: INTERVENTIONS:  -  Assess patient's ability to carry out ADLs; assess patient's baseline for ADL function and identify physical deficits which impact ability to perform ADLs (bathing, care of mouth/teeth, toileting, grooming, dressing, etc )  - Assess/evaluate cause of self-care deficits   - Assess range of motion  - Assess patient's mobility; develop plan if impaired  - Assess patient's need for assistive devices and provide as appropriate  - Encourage maximum independence but intervene and supervise when necessary  - Involve family in performance of ADLs  - Assess for home care needs following discharge   - Consider OT consult to assist with ADL evaluation and planning for discharge  - Provide patient education as appropriate  5/4/2021 2335 by Christina Rosenthal RN  Outcome: Progressing  5/4/2021 2334 by Christina Rosenthal RN  Outcome: Progressing  Goal: Maintain or return mobility status to optimal level  Description: INTERVENTIONS:  - Assess patient's baseline mobility status (ambulation, transfers, stairs, etc )    - Identify cognitive and physical deficits and behaviors that affect mobility  - Identify mobility aids required to assist with transfers and/or ambulation (gait belt, sit-to-stand, lift, walker, cane, etc )  - [place] fall precautions as indicated by assessment  - Record patient progress and toleration of activity level on Mobility SBAR; progress patient to next Phase/Stage  - Instruct patient to call for assistance with activity based on assessment  - Consider rehabilitation consult to assist with strengthening/weightbearing, etc   5/4/2021 2335 by Christina Rosenthal RN  Outcome: Progressing  5/4/2021 2334 by Christina Rosenthal RN  Outcome: Progressing     Problem: DISCHARGE PLANNING  Goal: Discharge to home or other facility with appropriate resources  Description: INTERVENTIONS:  - Identify barriers to discharge w/patient and caregiver  - Arrange for needed discharge resources and transportation as appropriate  - Identify discharge learning needs (meds, wound care, etc )  - Arrange for interpretive services to assist at discharge as needed  - Refer to Case Management Department for coordinating discharge planning if the patient needs post-hospital services based on physician/advanced practitioner order or complex needs related to functional status, cognitive ability, or social support system  5/4/2021 2335 by Christina Rosenthal RN  Outcome: Progressing  5/4/2021 2334 by Christina Rosenthal RN  Outcome: Progressing     Problem: POSTPARTUM  Goal: Experiences normal postpartum course  Description: INTERVENTIONS:  - Monitor maternal vital signs  - Assess uterine involution and lochia  Outcome: Progressing  Goal: Appropriate maternal -  bonding  Description: INTERVENTIONS:  - Identify family support  - Assess for appropriate maternal/infant bonding   -Encourage maternal/infant bonding opportunities  - Referral to  or  as needed  Outcome: Progressing  Goal: Establishment of infant feeding pattern  Description: INTERVENTIONS:  - Assess breast/bottle feeding  - Refer to lactation as needed  Outcome: Progressing  Goal: Incision(s), wounds(s) or drain site(s) healing without S/S of infection  Description: INTERVENTIONS  - Assess and document risk factors for skin impairment   - Assess and document dressing, incision, wound bed, drain sites and surrounding tissue  - Consider nutrition services referral as needed  - Oral mucous membranes remain intact  - Provide patient/ family education  Outcome: Progressing     Problem: Knowledge Deficit  Goal: Verbalizes understanding of labor plan  Description: Assess patient/family/caregiver's baseline knowledge level and ability to understand information  Provide education via patient/family/caregiver's preferred learning method at appropriate level of understanding  1  Provide teaching at level of understanding  2  Provide teaching via preferred learning method(s)  2021 by Sergio Lizama, RN  Outcome: Completed  2021 by Sergio Lizama, RN  Outcome: Progressing  Goal: Patient/family/caregiver demonstrates understanding of disease process, treatment plan, medications, and discharge instructions  Description: Complete learning assessment and assess knowledge base    Interventions:  - Provide teaching at level of understanding  - Provide teaching via preferred learning methods  2021 by Sergio Lizama RN  Outcome: Completed  5/4/2021 2334 by Abdoulaye Broussard RN  Outcome: Progressing

## 2021-05-05 NOTE — LACTATION NOTE
This note was copied from a baby's chart  Met with mother to go over discharge breastfeeding booklet including the feeding log  Emphasized 8 or more (12) feedings in a 24 hour period, what to expect for the number of diapers per day of life and the progression of properties of the  stooling pattern  Reviewed breastfeeding and your lifestyle, storage and preparation of breast milk, how to keep you breast pump clean, the employed breastfeeding mother and paced bottle feeding handouts  Booklet included Breastfeeding Resources for after discharge including access to the number for the [address]  Discussed s/s engorgement and how to manage with medications and cool compresses as well as s/s mastitis and when to contact physician  Baby is latched properly at the time of my visit, Mom states she is experiencing some pain with this latch due to previously shallow latching  Demonstrated how to break the baby's latch without causing further nipple trauma, and baby place on the opposite breast in football hold  Verbal guidance given to assist Mom with obtaining a wide latch : nose at nipple level, baby gapes and nipple dragged downward into her mouth while Mom gently guides the baby forward  Baby latches with a wide mouth at this time and Mom does expressed enhanced comfort

## 2021-05-05 NOTE — PLAN OF CARE
Problem: PAIN - ADULT  Goal: Verbalizes/displays adequate comfort level or baseline comfort level  Description: Interventions:  - Encourage patient to monitor pain and request assistance  - Assess pain using appropriate pain scale  - Administer analgesics based on type and severity of pain and evaluate response  - Implement non-pharmacological measures as appropriate and evaluate response  - Consider cultural and social influences on pain and pain management  - Notify physician/advanced practitioner if interventions unsuccessful or patient reports new pain  Outcome: Progressing     Problem: SAFETY ADULT  Goal: Patient will remain free of falls  Description: INTERVENTIONS:  - Assess patient frequently for physical needs  -  Identify cognitive and physical deficits and behaviors that affect risk of falls    -  [place] fall precautions as indicated by assessment   - Educate patient/family on patient safety including physical limitations  - Instruct patient to call for assistance with activity based on assessment  - Modify environment to reduce risk of injury  - Consider OT/PT consult to assist with strengthening/mobility  Outcome: Progressing  Goal: Maintain or return to baseline ADL function  Description: INTERVENTIONS:  -  Assess patient's ability to carry out ADLs; assess patient's baseline for ADL function and identify physical deficits which impact ability to perform ADLs (bathing, care of mouth/teeth, toileting, grooming, dressing, etc )  - Assess/evaluate cause of self-care deficits   - Assess range of motion  - Assess patient's mobility; develop plan if impaired  - Assess patient's need for assistive devices and provide as appropriate  - Encourage maximum independence but intervene and supervise when necessary  - Involve family in performance of ADLs  - Assess for home care needs following discharge   - Consider OT consult to assist with ADL evaluation and planning for discharge  - Provide patient education as appropriate  Outcome: Progressing  Goal: Maintain or return mobility status to optimal level  Description: INTERVENTIONS:  - Assess patient's baseline mobility status (ambulation, transfers, stairs, etc )    - Identify cognitive and physical deficits and behaviors that affect mobility  - Identify mobility aids required to assist with transfers and/or ambulation (gait belt, sit-to-stand, lift, walker, cane, etc )  - [place] fall precautions as indicated by assessment  - Record patient progress and toleration of activity level on Mobility SBAR; progress patient to next Phase/Stage  - Instruct patient to call for assistance with activity based on assessment  - Consider rehabilitation consult to assist with strengthening/weightbearing, etc   Outcome: Progressing     Problem: DISCHARGE PLANNING  Goal: Discharge to home or other facility with appropriate resources  Description: INTERVENTIONS:  - Identify barriers to discharge w/patient and caregiver  - Arrange for needed discharge resources and transportation as appropriate  - Identify discharge learning needs (meds, wound care, etc )  - Arrange for interpretive services to assist at discharge as needed  - Refer to Case Management Department for coordinating discharge planning if the patient needs post-hospital services based on physician/advanced practitioner order or complex needs related to functional status, cognitive ability, or social support system  Outcome: Progressing     Problem: POSTPARTUM  Goal: Experiences normal postpartum course  Description: INTERVENTIONS:  - Monitor maternal vital signs  - Assess uterine involution and lochia  Outcome: Progressing  Goal: Appropriate maternal -  bonding  Description: INTERVENTIONS:  - Identify family support  - Assess for appropriate maternal/infant bonding   -Encourage maternal/infant bonding opportunities  - Referral to  or  as needed  Outcome: Progressing  Goal: Establishment of infant feeding pattern  Description: INTERVENTIONS:  - Assess breast/bottle feeding  - Refer to lactation as needed  Outcome: Progressing  Goal: Incision(s), wounds(s) or drain site(s) healing without S/S of infection  Description: INTERVENTIONS  - Assess and document risk factors for skin impairment   - Assess and document dressing, incision, wound bed, drain sites and surrounding tissue  - Consider nutrition services referral as needed  - Oral mucous membranes remain intact  - Provide patient/ family education  Outcome: Progressing

## 2021-05-05 NOTE — PLAN OF CARE
Problem: PAIN - ADULT  Goal: Verbalizes/displays adequate comfort level or baseline comfort level  Description: Interventions:  - Encourage patient to monitor pain and request assistance  - Assess pain using appropriate pain scale  - Administer analgesics based on type and severity of pain and evaluate response  - Implement non-pharmacological measures as appropriate and evaluate response  - Consider cultural and social influences on pain and pain management  - Notify physician/advanced practitioner if interventions unsuccessful or patient reports new pain  5/5/2021 1303 by Gely Mauricio RN  Outcome: Completed  5/5/2021 0914 by Gely Mauricio RN  Outcome: Progressing     Problem: SAFETY ADULT  Goal: Patient will remain free of falls  Description: INTERVENTIONS:  - Assess patient frequently for physical needs  -  Identify cognitive and physical deficits and behaviors that affect risk of falls    -  [place] fall precautions as indicated by assessment   - Educate patient/family on patient safety including physical limitations  - Instruct patient to call for assistance with activity based on assessment  - Modify environment to reduce risk of injury  - Consider OT/PT consult to assist with strengthening/mobility  5/5/2021 1303 by Gely Mauricio RN  Outcome: Completed  5/5/2021 0914 by Gely Mauricio RN  Outcome: Progressing  Goal: Maintain or return to baseline ADL function  Description: INTERVENTIONS:  -  Assess patient's ability to carry out ADLs; assess patient's baseline for ADL function and identify physical deficits which impact ability to perform ADLs (bathing, care of mouth/teeth, toileting, grooming, dressing, etc )  - Assess/evaluate cause of self-care deficits   - Assess range of motion  - Assess patient's mobility; develop plan if impaired  - Assess patient's need for assistive devices and provide as appropriate  - Encourage maximum independence but intervene and supervise when necessary  - Involve family in performance of ADLs  - Assess for home care needs following discharge   - Consider OT consult to assist with ADL evaluation and planning for discharge  - Provide patient education as appropriate  5/5/2021 1303 by Viviane Springer RN  Outcome: Completed  5/5/2021 0914 by Viviane Springer RN  Outcome: Progressing  Goal: Maintain or return mobility status to optimal level  Description: INTERVENTIONS:  - Assess patient's baseline mobility status (ambulation, transfers, stairs, etc )    - Identify cognitive and physical deficits and behaviors that affect mobility  - Identify mobility aids required to assist with transfers and/or ambulation (gait belt, sit-to-stand, lift, walker, cane, etc )  - [place] fall precautions as indicated by assessment  - Record patient progress and toleration of activity level on Mobility SBAR; progress patient to next Phase/Stage  - Instruct patient to call for assistance with activity based on assessment  - Consider rehabilitation consult to assist with strengthening/weightbearing, etc   5/5/2021 1303 by Viviane Springer RN  Outcome: Completed  5/5/2021 0914 by Viviane Springer RN  Outcome: Progressing     Problem: DISCHARGE PLANNING  Goal: Discharge to home or other facility with appropriate resources  Description: INTERVENTIONS:  - Identify barriers to discharge w/patient and caregiver  - Arrange for needed discharge resources and transportation as appropriate  - Identify discharge learning needs (meds, wound care, etc )  - Arrange for interpretive services to assist at discharge as needed  - Refer to Case Management Department for coordinating discharge planning if the patient needs post-hospital services based on physician/advanced practitioner order or complex needs related to functional status, cognitive ability, or social support system  5/5/2021 1303 by Viviane Springer RN  Outcome: Completed  5/5/2021 0914 by Viviane Springer RN  Outcome: Progressing     Problem: POSTPARTUM  Goal: Experiences normal postpartum course  Description: INTERVENTIONS:  - Monitor maternal vital signs  - Assess uterine involution and lochia  2021 1303 by Viviane Springer RN  Outcome: Completed  2021 by Viviane Springer RN  Outcome: Progressing  Goal: Appropriate maternal -  bonding  Description: INTERVENTIONS:  - Identify family support  - Assess for appropriate maternal/infant bonding   -Encourage maternal/infant bonding opportunities  - Referral to  or  as needed  2021 1303 by Viviane Springer RN  Outcome: Completed  2021 by Viviane Springer RN  Outcome: Progressing  Goal: Establishment of infant feeding pattern  Description: INTERVENTIONS:  - Assess breast/bottle feeding  - Refer to lactation as needed  2021 1303 by Viviane Springer RN  Outcome: Completed  2021 by Viviane Springer RN  Outcome: Progressing  Goal: Incision(s), wounds(s) or drain site(s) healing without S/S of infection  Description: INTERVENTIONS  - Assess and document risk factors for skin impairment   - Assess and document dressing, incision, wound bed, drain sites and surrounding tissue  - Consider nutrition services referral as needed  - Oral mucous membranes remain intact  - Provide patient/ family education  2021 1303 by Viviane Springer RN  Outcome: Completed  2021 by Viviane Springer RN  Outcome: Progressing

## 2021-05-11 LAB — PLACENTA IN STORAGE: NORMAL

## 2021-05-12 NOTE — UTILIZATION REVIEW
Inpatient Admission Authorization Request  Your request has been successfully submitted and the reference number is [id number]     Notification of Maternity/Delivery & [place] Birth Information for Admission   SERVICING FACILITY:   [facility]  [address]  Tax ID: [id number]  NPI: [id number]  Place of Service: Inpatient [facility]  [address]  Place of Service Code: 24     ATTENDING PROVIDER:  Attending Name and NPI#: Miller Neal [[id number]]  Address: Kanchan Javier  [address]  Phone: [phone]     UTILIZATION REVIEW CONTACT:  Nerissa Centeno Utilization   Network Utilization Review Department  Phone: [phone]  Fax [phone]  Email: [email or web address]     PHYSICIAN ADVISORY SERVICES:  FOR [id number] REVIEW - MEDICAL NECESSITY DENIAL  Phone: [phone]  Fax: [phone]  Email: [email or web address]     TYPE OF REQUEST:  Inpatient Status     ADMISSION INFORMATION:  ADMISSION DATE/TIME: 5/3/21 1538  PATIENT DIAGNOSIS CODE/DESCRIPTION:  39 weeks gestation of pregnancy [Z3A 39]  Encounter for full-term uncomplicated delivery [G05] The primary encounter diagnosis was 39 weeks gestation of pregnancy  A diagnosis of  (spontaneous vaginal delivery) was also pertinent to this visit  1  39 weeks gestation of pregnancy    2    (spontaneous vaginal delivery)      DISCHARGE DATE/TIME: 2021  1:11 PM  DISCHARGE DISPOSITION (IF DISCHARGED): Home/Self Care     MOTHER AND  INFORMATION:  Mother: Claudean Knack 1980   Delivering clinician: Shikha Moon   OB History        3    Para   2    Term   2       0    AB   1    Living   2       SAB   0    TAB   0    Ectopic   0    Multiple   0    Live Births   2               [place] Name & MRN:   Information for the patient's :  Isabela Legacy [[id number]]     [place] Delivery Information:  Sex: female  Delivered 5/3/2021 9:22 PM by Vaginal, Spontaneous; Gestational Age: 44w2d    [place] Measurements:  Weight: 8 lb 1 oz (3657 g); Height: 21"    APGAR 1 minute 5 minutes 10 minutes   Totals: 9 9       Birth Information: 36 y o  female MRN: [id number] Unit/Bed#: -01 Estimated Date of Delivery: 21  Birthweight: No birth weight on file  Gestational Age: <None> Delivery Type: Vaginal, Spontaneous          APGARS  One minute Five minutes Ten minutes   Totals:               IMPORTANT INFORMATION:  Please contact the [facility] directly with any questions or concerns regarding this request  Department voicemails are confidential     Send requests for admission clinical reviews, concurrent reviews, approvals, and administrative denials due to lack of clinical to fax [phone]

## 2021-06-03 ENCOUNTER — POSTPARTUM VISIT (OUTPATIENT)
Dept: OBGYN CLINIC | Facility: CLINIC | Age: 41
End: 2021-06-03

## 2021-06-03 VITALS — HEIGHT: 66 IN | WEIGHT: 167.4 LBS | BODY MASS INDEX: 26.9 KG/M2

## 2021-06-03 DIAGNOSIS — Z30.011 ENCOUNTER FOR INITIAL PRESCRIPTION OF CONTRACEPTIVE PILLS: ICD-10-CM

## 2021-06-03 PROBLEM — O09.529 ANTEPARTUM MULTIGRAVIDA OF ADVANCED MATERNAL AGE: Status: RESOLVED | Noted: 2020-10-29 | Resolved: 2021-06-03

## 2021-06-03 PROBLEM — D25.9 UTERINE FIBROID IN PREGNANCY: Status: RESOLVED | Noted: 2020-10-29 | Resolved: 2021-06-03

## 2021-06-03 PROBLEM — Z34.90 SUPERVISION OF NORMAL PREGNANCY: Status: RESOLVED | Noted: 2020-12-08 | Resolved: 2021-06-03

## 2021-06-03 PROBLEM — O42.92 FULL-TERM PREMATURE RUPTURE OF MEMBRANES: Status: RESOLVED | Noted: 2021-05-03 | Resolved: 2021-06-03

## 2021-06-03 PROBLEM — Z3A.39 39 WEEKS GESTATION OF PREGNANCY: Status: RESOLVED | Noted: 2019-06-01 | Resolved: 2021-06-03

## 2021-06-03 PROBLEM — Z3A.38 38 WEEKS GESTATION OF PREGNANCY: Status: RESOLVED | Noted: 2020-12-08 | Resolved: 2021-06-03

## 2021-06-03 PROBLEM — O34.10 UTERINE FIBROID IN PREGNANCY: Status: RESOLVED | Noted: 2020-10-29 | Resolved: 2021-06-03

## 2021-06-03 PROBLEM — O35.2XX0 HEREDITARY DISEASE IN FAMILY POSSIBLY AFFECTING FETUS: Status: RESOLVED | Noted: 2019-06-01 | Resolved: 2021-06-03

## 2021-06-03 PROCEDURE — 99024 POSTOP FOLLOW-UP VISIT: CPT | Performed by: OBSTETRICS & GYNECOLOGY

## 2021-06-03 RX ORDER — ACETAMINOPHEN AND CODEINE PHOSPHATE 120; 12 MG/5ML; MG/5ML
1 SOLUTION ORAL DAILY
Qty: 84 TABLET | Refills: 4 | Status: SHIPPED | OUTPATIENT
Start: 2021-06-03 | End: 2021-09-17 | Stop reason: ALTCHOICE

## 2021-06-03 NOTE — PROGRESS NOTES
Assessment/Plan     Normal postpartum exam      1  Contraception: oral progesterone-only contraceptive  Discussed switching to [facility] when done breastfeeding if  has not yet had vasectomy done  2  Annual exam due in September  Pap due 2024  3  Lactation consult, [address] [facility] information discussed  4  Increase activity as tolerated, may resume all normal activity  5  Anticipated return to work: 6 - 12 weeks post partum  6  Elevated EPDS: Continue to follow with therapist    Clem     [place] is a 36 y o  female who presents for a postpartum visit  She is 4 weeks postpartum following a spontaneous vaginal delivery  I have fully reviewed the prenatal and intrapartum course  The delivery was at 44 gestational weeks  Anesthesia: epidural  Laceration: 2nd degree  Bleeding staining only  Bowel function is normal, does have hemorrhoids  Bladder function is normal  Patient has not been sexually active  Desired contraception method is oral progesterone-only contraceptive   planning vasectomy  Postpartum depression screening: negative  EPDS : 8    Baby's course has been uncomplicated     Baby is feeding by breast     Last Pap : 6/6/2019 ; no abnormalities  Gestational Diabetes: no  Pregnancy Complications: none    The following portions of the patient's history were reviewed and updated as appropriate: allergies, current medications, past family history, past medical history, past social history, past surgical history and problem list       Current Outpatient Medications:     acetaminophen (TYLENOL) 325 mg tablet, Take 2 tablets (650 mg total) by mouth every 6 (six) hours as needed for mild pain, headaches or fever, Disp:  , Rfl:     docusate sodium (COLACE) 100 mg capsule, Take 100 mg by mouth 2 (two) times a day, Disp: , Rfl:     ferrous sulfate 325 (65 Fe) mg tablet, Take 325 mg by mouth daily with breakfast, Disp: , Rfl:     fexofenadine (ALLEGRA) 180 MG tablet, Take 1 tablet (180 mg total) by mouth daily, Disp: , Rfl:     fluticasone (FLONASE) 50 mcg/act nasal spray, 2 sprays into each nostril daily As needed for allergies/congestion, Disp: 1 Bottle, Rfl: 3    Folic Acid 0 8 MG CAPS, Take 1 capsule by mouth daily, Disp: , Rfl:     ibuprofen (MOTRIN) 200 mg tablet, Take 3 tablets (600 mg total) by mouth every 6 (six) hours as needed (cramping), Disp: , Rfl:     meclizine (ANTIVERT) 25 mg tablet, Take 1 tablet (25 mg total) by mouth every 8 (eight) hours, Disp: 30 tablet, Rfl: 0    Prenatal MV & Min w/FA-DHA (PRENATAL ADULT GUMMY/DHA/FA) 0 4-25 MG CHEW, Chew 2 tablets daily at bedtime, Disp: , Rfl:     witch hazel-glycerin (TUCKS) topical pad, Apply 1 pad topically every 2 (two) hours as needed for irritation (Patient not taking: Reported on 6/3/2021), Disp: 40 each, Rfl: 0    Allergies   Allergen Reactions    Mold Extract [Trichophyton] Sneezing    Other GI Intolerance and Vomiting     [place]    Pollen Extract Sneezing       Review of Systems  Constitutional: no fever, feels well  Breasts: no complaints of breast pain, breast lump, or nipple discharge  Gastrointestinal: no complaints nausea, vomiting  Genitourinary: as noted in HPI    Neurological: no complaints of headache      Objective      Ht 5' 6" (1 676 m)   Wt 75 9 kg (167 lb 6 4 oz)   LMP 08/01/2020   Breastfeeding Yes   BMI 27 02 kg/m²     OBGyn Exam  General: alert and oriented, in no acute distress  Vulva: normal, [place]th[place]'s, Urethra, [place]'s normal, perineum well healed  Vagina: normal mucosa  Cervix: no cervical motion tenderness  Uterus:  normal size, mobile, non-tender

## 2021-07-26 ENCOUNTER — TELEPHONE (OUTPATIENT)
Dept: FAMILY MEDICINE CLINIC | Facility: CLINIC | Age: 41
End: 2021-07-26

## 2021-07-26 NOTE — TELEPHONE ENCOUNTER
Provider will discuss timing of testing with family at daughter's appointment tomorrow as more detailed info is needed prior to testing          Test results turnaround time is typically less than 24 hours

## 2021-09-17 ENCOUNTER — ANNUAL EXAM (OUTPATIENT)
Dept: OBGYN CLINIC | Facility: CLINIC | Age: 41
End: 2021-09-17
Payer: COMMERCIAL

## 2021-09-17 VITALS — HEIGHT: 66 IN | SYSTOLIC BLOOD PRESSURE: 114 MMHG | BODY MASS INDEX: 27.02 KG/M2 | DIASTOLIC BLOOD PRESSURE: 64 MMHG

## 2021-09-17 DIAGNOSIS — Z12.31 ENCOUNTER FOR MAMMOGRAM TO ESTABLISH BASELINE MAMMOGRAM: Primary | ICD-10-CM

## 2021-09-17 PROCEDURE — 99396 PREV VISIT EST AGE 40-64: CPT | Performed by: OBSTETRICS & GYNECOLOGY

## 2021-09-17 NOTE — PROGRESS NOTES
ASSESSMENT & PLAN: Crista Broderick is a 39 y o  [id number] with normal gynecologic exam     1   Routine well woman exam done today  2    Pap and HPV:Pap with HPV was not done today  Current ASCCP Guidelines reviewed  Last Pap   :  no abnormalities  3   The patient declined STD testing  none testing performed  Safe sex practices have been discussed  4  The patient is not sexually active  Declines contraception at this time  5  The following were reviewed in today's visit: breast self exam, mammography screening ordered, STD testing, exercise and healthy diet  6  Patient to return to office in 12 months for annual    7  Mammogram ordered - patient instructed to wait to get mammo until 6 months after she finishes breastfeeding    All questions have been answered to her satisfaction  CC:  Annual Gynecologic Examination    HPI: Crista Broderick is a 39 y o  [id number] who presents for annual gynecologic examination  She is feeling well at this time and has no complaints  Health Maintenance:    She exercises 4 days per week  walking  She does perform regular monthly self breast exams  She feels safe at home  Patients does follow a mostly well balanced diet  Past Medical History:   Diagnosis Date    Abnormal Pap smear of cervix     Anxiety     HPV (human papilloma virus) infection     Meniere's disease     Vaginal delivery 2019    Varicella     had one pox, unsure if immuned       Past Surgical History:   Procedure Laterality Date    CERVICAL BIOPSY  W/ LOOP ELECTRODE EXCISION      2015    TONSILLECTOMY      WISDOM TOOTH EXTRACTION         Past OB/Gyn History:   No LMP recorded  Menstrual History:  OB History        3    Para   2    Term   2       0    AB   1    Living   2       SAB   0    TAB   0    Ectopic   0    Multiple   0    Live Births   2                Menarche age: 15  No LMP recorded        Not currently getting a period due to breastfeeding    History of sexually transmitted infection Yes  HPV and HSV 1  Patient is not currently sexually active  heterosexual and  monogamous  years Birth control: none  Last Pap  2019 :  no abnormalities      Family History   Problem Relation Age of Onset    Hypertension Mother     Diabetes Mother     Arthritis Mother     Thyroid disease Mother     Varicose Veins Mother     Meniere's disease Mother     Cancer Father         skin    Other Father         Blood Transfusion    No Known Problems Sister     No Known Problems Brother     Cancer Maternal Grandmother         skin    Cancer Maternal Grandfather         skin    Cancer Paternal Grandmother         skin    Alzheimer's disease Paternal Grandmother     Osteoporosis Paternal Grandmother     Heart attack Paternal Grandfather     Cancer Paternal Grandfather         SKIN    No Known Problems Daughter     Breast cancer Paternal Aunt     Hypertension Family     No Known Problems Daughter        Social History:  Social History     Socioeconomic History    Marital status: /Civil Union     Spouse name: Herberth Alford Number of children: 1    Years of education: Not on file    Highest education level: Doctorate   Occupational History    Occupation: Professor    Tobacco Use    Smoking status: Never Smoker    Smokeless tobacco: Never Used   Vaping Use    Vaping Use: Never used   Substance and Sexual Activity    Alcohol use: Not Currently     Comment: none for about two years    Drug use: No    Sexual activity: Yes     Partners: Male     Birth control/protection: None   Other Topics Concern    Not on file   Social History Narrative     - Tanner Castro    1 daughter, Arabella Garza 12/2019    2 cats     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     Social Determinants of Health     Financial Resource Strain:     Difficulty of Paying Living Expenses:    Food Insecurity:     Worried About [address] in the Last Year:    [address] in the Last Year: Transportation Needs:     Lack of Transportation (Medical):  Lack of Transportation (Non-Medical):    Physical Activity:     Days of Exercise per Week:     Minutes of Exercise per Session:    Stress:     Feeling of Stress :    Social Connections:     Frequency of Communication with Friends and Family:     Frequency of Social Gatherings with Friends and Family:     Attends Baptism Services:     Active Member of Clubs or Organizations:     Attends Club or Organization Meetings:     Marital Status:    Intimate Partner Violence:     Fear of Current or Ex-Partner:     Emotionally Abused:     Physically Abused:     Sexually Abused:      Presently lives with  and children  Patient is     Patient is currently employed professor       Allergies   Allergen Reactions    Mold Extract [Trichophyton] Sneezing    Other GI Intolerance and Vomiting     Watermellon    Pollen Extract Sneezing       Current Outpatient Medications:     fexofenadine (ALLEGRA) 180 MG tablet, Take 1 tablet (180 mg total) by mouth daily, Disp: , Rfl:     fluticasone (FLONASE) 50 mcg/act nasal spray, 2 sprays into each nostril daily As needed for allergies/congestion, Disp: 1 Bottle, Rfl: 3    meclizine (ANTIVERT) 25 mg tablet, Take 1 tablet (25 mg total) by mouth every 8 (eight) hours, Disp: 30 tablet, Rfl: 0    Prenatal MV & Min w/FA-DHA (PRENATAL ADULT GUMMY/DHA/FA) 0 4-25 MG CHEW, Chew 2 tablets daily at bedtime, Disp: , Rfl:     acetaminophen (TYLENOL) 325 mg tablet, Take 2 tablets (650 mg total) by mouth every 6 (six) hours as needed for mild pain, headaches or fever, Disp:  , Rfl:     docusate sodium (COLACE) 100 mg capsule, Take 100 mg by mouth 2 (two) times a day, Disp: , Rfl:     ferrous sulfate 325 (65 Fe) mg tablet, Take 325 mg by mouth daily with breakfast, Disp: , Rfl:     Folic Acid 0 8 MG CAPS, Take 1 capsule by mouth daily, Disp: , Rfl:     ibuprofen (MOTRIN) 200 mg tablet, Take 3 tablets (600 mg total) by mouth every 6 (six) hours as needed (cramping), Disp: , Rfl:     norethindrone (MICRONOR) 0 35 MG tablet, Take 1 tablet (0 35 mg total) by mouth daily, Disp: 84 tablet, Rfl: 4    witch hazel-glycerin (TUCKS) topical pad, Apply 1 pad topically every 2 (two) hours as needed for irritation (Patient not taking: Reported on 6/3/2021), Disp: 40 each, Rfl: 0    Review of Systems:  Review of Systems   Constitutional: Negative for chills, fever and unexpected weight change  Respiratory: Negative for shortness of breath  Cardiovascular: Negative for chest pain  Gastrointestinal: Negative for abdominal pain, diarrhea, nausea and vomiting  Skin: Negative for rash  Psychiatric/Behavioral: Negative for dysphoric mood  The patient is not nervous/anxious  Physical Exam:  /64   Ht 5' 6" (1 676 m)   Breastfeeding Yes   BMI 27 02 kg/m²    Physical Exam  Constitutional:       General: She is not in acute distress  HENT:      Head: Normocephalic and atraumatic  Neck:      Thyroid: No thyroid mass or thyromegaly  Cardiovascular:      Rate and Rhythm: Normal rate and regular rhythm  Pulmonary:      Effort: Pulmonary effort is normal       Breath sounds: Normal breath sounds  Chest:      Comments: Deferred  Patient breastfeeding  Abdominal:      General: There is no distension  Palpations: Abdomen is soft  Tenderness: There is no abdominal tenderness  Genitourinary:     General: Normal vulva  Exam position: Lithotomy position  Pubic Area: No rash  Labia:         Right: No rash or lesion  Left: No rash or lesion  Vagina: Normal  No vaginal discharge or lesions  Cervix: No cervical motion tenderness, discharge or lesion  Uterus: Normal        Adnexa:         Right: No mass  Left: No mass  Skin:     General: Skin is warm and dry  Findings: No lesion or rash  Neurological:      Mental Status: She is alert     Psychiatric: Mood and Affect: Mood normal          Speech: Speech normal          Behavior: Behavior normal

## 2021-12-01 ENCOUNTER — TELEMEDICINE (OUTPATIENT)
Dept: FAMILY MEDICINE CLINIC | Facility: CLINIC | Age: 41
End: 2021-12-01
Payer: COMMERCIAL

## 2021-12-01 ENCOUNTER — TELEPHONE (OUTPATIENT)
Dept: FAMILY MEDICINE CLINIC | Facility: CLINIC | Age: 41
End: 2021-12-01

## 2021-12-01 VITALS — BODY MASS INDEX: 24.11 KG/M2 | HEIGHT: 66 IN | WEIGHT: 150 LBS

## 2021-12-01 DIAGNOSIS — B34.9 VIRAL INFECTION, UNSPECIFIED: Primary | ICD-10-CM

## 2021-12-01 PROCEDURE — U0005 INFEC AGEN DETEC AMPLI PROBE: HCPCS | Performed by: FAMILY MEDICINE

## 2021-12-01 PROCEDURE — U0003 INFECTIOUS AGENT DETECTION BY NUCLEIC ACID (DNA OR RNA); SEVERE ACUTE RESPIRATORY SYNDROME CORONAVIRUS 2 (SARS-COV-2) (CORONAVIRUS DISEASE [COVID-19]), AMPLIFIED PROBE TECHNIQUE, MAKING USE OF HIGH THROUGHPUT TECHNOLOGIES AS DESCRIBED BY CMS-2020-01-R: HCPCS | Performed by: FAMILY MEDICINE

## 2021-12-01 PROCEDURE — 99213 OFFICE O/P EST LOW 20 MIN: CPT | Performed by: FAMILY MEDICINE

## 2021-12-01 PROCEDURE — 3725F SCREEN DEPRESSION PERFORMED: CPT | Performed by: FAMILY MEDICINE

## 2021-12-01 PROCEDURE — 3008F BODY MASS INDEX DOCD: CPT | Performed by: FAMILY MEDICINE

## 2022-06-23 ENCOUNTER — OFFICE VISIT (OUTPATIENT)
Dept: FAMILY MEDICINE CLINIC | Facility: CLINIC | Age: 42
End: 2022-06-23
Payer: COMMERCIAL

## 2022-06-23 VITALS
HEIGHT: 66 IN | RESPIRATION RATE: 16 BRPM | BODY MASS INDEX: 23.46 KG/M2 | SYSTOLIC BLOOD PRESSURE: 100 MMHG | WEIGHT: 146 LBS | TEMPERATURE: 97.9 F | OXYGEN SATURATION: 100 % | HEART RATE: 83 BPM | DIASTOLIC BLOOD PRESSURE: 64 MMHG

## 2022-06-23 DIAGNOSIS — L30.9 DERMATITIS: Primary | ICD-10-CM

## 2022-06-23 PROCEDURE — 3008F BODY MASS INDEX DOCD: CPT | Performed by: FAMILY MEDICINE

## 2022-06-23 PROCEDURE — 99213 OFFICE O/P EST LOW 20 MIN: CPT | Performed by: FAMILY MEDICINE

## 2022-06-23 PROCEDURE — 1036F TOBACCO NON-USER: CPT | Performed by: FAMILY MEDICINE

## 2022-06-23 NOTE — PROGRESS NOTES
Assessment/Plan:  Problem List Items Addressed This Visit    None     Visit Diagnoses     Dermatitis    -  Primary    Suspect parvovirus B19 considering sick contact - daughter  Advise supportive care  Avoiding prednisone due to potential worsening of viral symptoms  Fragrance free topicals advised  Start Pepcid 20mg twice daily as needed for itching  Advise Claritin/Zyrtec/Allegra/ Bendaryl as needed for itching  Avoid decongestants if you have high blood pressure  Return if symptoms worsen or fail to improve  No future appointments  Subjective:     Fuad Hummel is a 39 y o  female who presents today for a follow-up on her acute medical conditions  HPI:  Chief Complaint   Patient presents with    Rash     Noticed rash yesterday, all over  Itchy, declines coming in contact with poison  -- Above per clinical staff and reviewed  --    HPI      Today:      Rash - Symptoms x 1 day  Started on Dorsal hands, spread to arms, legs, trunk, neck, chin  +Itching  No pain  Using Benadryl QHS c relief  +Sick contacts - Daughter has 5th Disease  Patient had been doing yard work, but denies known contact allergy  Denies known tick exposure  Denies new topicals or meds  The following portions of the patient's history were reviewed and updated as appropriate: allergies, current medications, past family history, past medical history, past social history, past surgical history and problem list       Review of Systems   Constitutional: Negative for appetite change, chills, diaphoresis, fatigue and fever  Respiratory: Negative for chest tightness and shortness of breath  Cardiovascular: Negative for chest pain  Gastrointestinal: Negative for abdominal pain, blood in stool, diarrhea, nausea and vomiting  Genitourinary: Negative for dysuria  Skin: Positive for rash          Current Outpatient Medications   Medication Sig Dispense Refill    fexofenadine (ALLEGRA) 180 MG tablet Take 1 tablet (180 mg total) by mouth daily      fluticasone (FLONASE) 50 mcg/act nasal spray 2 sprays into each nostril daily As needed for allergies/congestion 1 Bottle 3    meclizine (ANTIVERT) 25 mg tablet Take 1 tablet (25 mg total) by mouth every 8 (eight) hours (Patient taking differently: Take 25 mg by mouth every 8 (eight) hours as needed) 30 tablet 0    Prenatal MV & Min w/FA-DHA (PRENATAL ADULT GUMMY/DHA/FA) 0 4-25 MG CHEW Chew 2 tablets daily at bedtime       No current facility-administered medications for this visit  Objective:  /64   Pulse 83   Temp 97 9 °F (36 6 °C)   Resp 16   Ht 5' 6" (1 676 m)   Wt 66 2 kg (146 lb)   SpO2 100%   BMI 23 57 kg/m²    Wt Readings from Last 3 Encounters:   06/23/22 66 2 kg (146 lb)   12/01/21 68 kg (150 lb)   06/03/21 75 9 kg (167 lb 6 4 oz)      BP Readings from Last 3 Encounters:   06/23/22 100/64   09/17/21 114/64   05/05/21 116/60          Physical Exam  Vitals and nursing note reviewed  Constitutional:       Appearance: Normal appearance  She is well-developed and normal weight  HENT:      Head: Normocephalic and atraumatic  Eyes:      Conjunctiva/sclera: Conjunctivae normal    Neck:      Thyroid: No thyromegaly  Cardiovascular:      Rate and Rhythm: Normal rate and regular rhythm  Pulses: Normal pulses  Heart sounds: Normal heart sounds  Pulmonary:      Effort: Pulmonary effort is normal       Breath sounds: Normal breath sounds  Musculoskeletal:         General: No swelling  Cervical back: Neck supple  Right lower leg: No edema  Left lower leg: No edema  Skin:     Findings: Rash (Diffuse, nontender, blanching, erythematous macular rash on B/L legs, B/L lower UE, B/L dorsal hands, abdomen, chin, B/L posterior ears) present  Neurological:      General: No focal deficit present  Mental Status: She is alert and oriented to person, place, and time     Psychiatric:         Mood and Affect: Mood normal  Lab Results:      Lab Results   Component Value Date    WBC 15 91 (H) 05/03/2021    HGB 11 5 05/03/2021    HCT 34 8 05/03/2021     05/03/2021    K 3 2 (L) 08/31/2020     08/31/2020    CREATININE 0 83 08/31/2020    BUN 13 08/31/2020    CO2 22 08/31/2020     No results found for: URICACID  Invalid input(s): BASENAME Vitamin D    No results found       POCT Labs

## 2022-06-23 NOTE — PATIENT INSTRUCTIONS
Start Pepcid 20mg twice daily as needed for itching  Advise Claritin/Zyrtec/Allegra/ Bendaryl as needed for itching  Avoid decongestants if you have high blood pressure  Dermatitis   AMBULATORY CARE:   Dermatitis  is skin inflammation  Dermatitis may be caused by allergens such as dust mites, pet dander, pollen, and certain foods  Dermatitis can also develop when something touches your skin and irritates it or causes an allergic reaction  Examples include soaps, chemicals, latex, and poison ivy  Signs and symptoms of dermatitis  depend on the cause: An itchy rash    Redness    Bumps or blisters that crust over or ooze clear fluid    Swelling    Call your local emergency number (911 in the [facility]) or have someone call if:   You have symptoms of anaphylaxis, such as sudden trouble breathing, throat swelling, or feeling dizzy or lightheaded  Seek care immediately if:   You develop a fever or have red streaks going up your arm or leg  Your rash gets more swollen, red, or hot  Call your doctor or dermatologist if:   Your skin blisters, oozes white or yellow pus, or has a foul-smelling discharge  Your rash spreads or does not get better, even after treatment  You have questions or concerns about your condition or care  Treatment for dermatitis  depends on the cause of your rash  You may need medicines to help decrease itching and inflammation or treat a bacterial infection  They may be given as a topical cream, shot, or a pill  Manage dermatitis:   Apply a cool compress to your rash  This will help soothe your skin  Apply lotions or creams to the area  These help keep your skin moist and decrease itching  Apply the lotion or cream right after a lukewarm bath or shower when your skin is still damp  Use products that do not contain dye or a scent  Avoid skin irritants  Examples include makeup, hair products, soaps, and cleansers  Use products that do not contain a scent or dye      Follow up with your doctor or dermatologist as directed:  Write down your questions so you remember to ask them during your visits  © Copyright [employer] 2022 Information is for End User's use only and may not be sold, redistributed or otherwise used for commercial purposes  All illustrations and images included in CareNotes® are the copyrighted property of [employer]  or [employer]on [employer]  The above information is an  only  It is not intended as medical advice for individual conditions or treatments  Talk to your doctor, nurse or pharmacist before following any medical regimen to see if it is safe and effective for you

## 2022-06-30 ENCOUNTER — VBI (OUTPATIENT)
Dept: ADMINISTRATIVE | Facility: OTHER | Age: 42
End: 2022-06-30

## 2022-08-01 ENCOUNTER — TELEPHONE (OUTPATIENT)
Dept: FAMILY MEDICINE CLINIC | Facility: CLINIC | Age: 42
End: 2022-08-01

## 2022-08-01 NOTE — TELEPHONE ENCOUNTER
Patient self-scheduled appt for Tuesday, 8/1/22 at 11:00am for body aches  Please ask her to take a home COVID test and change visit to virtual if positive

## 2022-08-02 ENCOUNTER — HOSPITAL ENCOUNTER (OUTPATIENT)
Dept: RADIOLOGY | Facility: HOSPITAL | Age: 42
Discharge: HOME | End: 2022-08-02
Attending: FAMILY MEDICINE
Payer: COMMERCIAL

## 2022-08-02 ENCOUNTER — LAB (OUTPATIENT)
Dept: LAB | Facility: CLINIC | Age: 42
End: 2022-08-02
Payer: COMMERCIAL

## 2022-08-02 ENCOUNTER — OFFICE VISIT (OUTPATIENT)
Dept: FAMILY MEDICINE CLINIC | Facility: CLINIC | Age: 42
End: 2022-08-02
Payer: COMMERCIAL

## 2022-08-02 ENCOUNTER — TELEPHONE (OUTPATIENT)
Dept: FAMILY MEDICINE CLINIC | Facility: CLINIC | Age: 42
End: 2022-08-02

## 2022-08-02 VITALS
WEIGHT: 147 LBS | HEIGHT: 66 IN | OXYGEN SATURATION: 99 % | TEMPERATURE: 97.6 F | DIASTOLIC BLOOD PRESSURE: 72 MMHG | BODY MASS INDEX: 23.63 KG/M2 | SYSTOLIC BLOOD PRESSURE: 108 MMHG | HEART RATE: 92 BPM | RESPIRATION RATE: 18 BRPM

## 2022-08-02 DIAGNOSIS — R06.09 DOE (DYSPNEA ON EXERTION): ICD-10-CM

## 2022-08-02 DIAGNOSIS — R07.9 CHEST PAIN, UNSPECIFIED TYPE: ICD-10-CM

## 2022-08-02 DIAGNOSIS — R07.9 CHEST PAIN, UNSPECIFIED TYPE: Primary | ICD-10-CM

## 2022-08-02 DIAGNOSIS — R06.00 DYSPNEA, UNSPECIFIED TYPE: ICD-10-CM

## 2022-08-02 DIAGNOSIS — J30.9 ALLERGIC RHINITIS, UNSPECIFIED SEASONALITY, UNSPECIFIED TRIGGER: ICD-10-CM

## 2022-08-02 LAB
ALBUMIN SERPL BCP-MCNC: 4.4 G/DL (ref 3.5–5)
ALP SERPL-CCNC: 76 U/L (ref 34–104)
ALT SERPL W P-5'-P-CCNC: 15 U/L (ref 7–52)
ANION GAP SERPL CALCULATED.3IONS-SCNC: 6 MMOL/L (ref 4–13)
AST SERPL W P-5'-P-CCNC: 14 U/L (ref 13–39)
BILIRUB SERPL-MCNC: 0.22 MG/DL (ref 0.2–1)
BUN SERPL-MCNC: 22 MG/DL (ref 5–25)
CALCIUM SERPL-MCNC: 9.5 MG/DL (ref 8.4–10.2)
CHLORIDE SERPL-SCNC: 105 MMOL/L (ref 96–108)
CO2 SERPL-SCNC: 28 MMOL/L (ref 21–32)
CREAT SERPL-MCNC: 0.74 MG/DL (ref 0.6–1.3)
D DIMER PPP FEU-MCNC: 0.32 UG/ML FEU
GFR SERPL CREATININE-BSD FRML MDRD: 100 ML/MIN/1.73SQ M
GLUCOSE SERPL-MCNC: 99 MG/DL (ref 65–140)
POTASSIUM SERPL-SCNC: 3.8 MMOL/L (ref 3.5–5.3)
PROT SERPL-MCNC: 7 G/DL (ref 6.4–8.4)
SODIUM SERPL-SCNC: 139 MMOL/L (ref 135–147)

## 2022-08-02 PROCEDURE — 99214 OFFICE O/P EST MOD 30 MIN: CPT | Performed by: FAMILY MEDICINE

## 2022-08-02 PROCEDURE — 85379 FIBRIN DEGRADATION QUANT: CPT

## 2022-08-02 PROCEDURE — 93000 ELECTROCARDIOGRAM COMPLETE: CPT | Performed by: FAMILY MEDICINE

## 2022-08-02 PROCEDURE — 36415 COLL VENOUS BLD VENIPUNCTURE: CPT

## 2022-08-02 PROCEDURE — 71046 X-RAY EXAM CHEST 2 VIEWS: CPT

## 2022-08-02 PROCEDURE — 80053 COMPREHEN METABOLIC PANEL: CPT

## 2022-08-02 RX ORDER — MONTELUKAST SODIUM 10 MG/1
10 TABLET ORAL
Qty: 30 TABLET | Refills: 1 | Status: SHIPPED | OUTPATIENT
Start: 2022-08-02

## 2022-08-02 RX ORDER — ALBUTEROL SULFATE 90 UG/1
2 AEROSOL, METERED RESPIRATORY (INHALATION) EVERY 4 HOURS PRN
Qty: 18 G | Refills: 0 | Status: SHIPPED | OUTPATIENT
Start: 2022-08-02

## 2022-08-02 NOTE — PROGRESS NOTES
COVID-19 Outpatient Progress Note    Assessment/Plan:    Problem List Items Addressed This Visit        Respiratory    AR (allergic rhinitis)    Relevant Medications    montelukast (SINGULAIR) 10 mg tablet    Other Relevant Orders    Complete PFT with post bronchodilator      Other Visit Diagnoses     Chest pain, unspecified type    -  Primary    Relevant Medications    montelukast (SINGULAIR) 10 mg tablet    albuterol (PROVENTIL HFA,VENTOLIN HFA) 90 mcg/act inhaler    Other Relevant Orders    POCT ECG (Completed)    XR chest pa & lateral (Completed)    D-dimer, quantitative    Comprehensive metabolic panel    Complete PFT with post bronchodilator    OSORIO (dyspnea on exertion)        Relevant Medications    montelukast (SINGULAIR) 10 mg tablet    albuterol (PROVENTIL HFA,VENTOLIN HFA) 90 mcg/act inhaler    Other Relevant Orders    POCT ECG (Completed)    XR chest pa & lateral (Completed)    D-dimer, quantitative    Comprehensive metabolic panel    Complete PFT with post bronchodilator         Normal sinus rhythm at 71 beats per minute  Normal axis and normal intervals  No acute ST elevation or depression  No acute T-wave inversion  No prior EKG for comparison  Disposition:     After clarifying the patient's history, my suspicion for COVID-19 infection is very low  Chest Pain / OSORIO - Stable EKG  Check CXR - R/O Pneumonia vs  Mass  Check D-dimer - R/O PE  Check PFTS - R/O Asthma  Start Singulair 10mg QHS and Albuterol HFA PRN  I have spent 20 minutes directly with the patient  Greater than 50% of this time was spent in counseling/coordination of care regarding: diagnostic results, prognosis, risks and benefits of treatment options, instructions for management, patient and family education, importance of treatment compliance, risk factor reductions and impressions        Encounter provider Hayley Bernal DO    Provider located at [address] [place] [address] [address]  [address]  [facility] [address]  [phone]    Recent Visits  Date Type Provider Dept   08/02/22 Telephone Sonda Councilman Pg Corie Ang   08/02/22 Office Visit DO Derrick Gastelum   08/01/22 Telephone DO Derrick Gastelum   Showing recent visits within past 7 days and meeting all other requirements  Future Appointments  No visits were found meeting these conditions  Showing future appointments within next 150 days and meeting all other requirements     Subjective:   Thor Almonte is a 39 y o  female who is concerned about COVID-19  Patient's symptoms include shortness of breath and myalgias (Left anterior chest)  Patient denies fever, chills, fatigue, congestion, rhinorrhea (With activity), sore throat, anosmia, loss of taste, cough, chest tightness, abdominal pain, nausea, vomiting, diarrhea and headaches  - Date of symptom onset: 7/2/2022      COVID-19 vaccination status: Fully vaccinated with booster    Exposure:   Contact with a person who is under investigation (PUI) for or who is positive for COVID-19 within the last 14 days?: No    Hospitalized recently for fever and/or lower respiratory symptoms?: No      Currently a healthcare worker that is involved in direct patient care?: No      Works in a special setting where the risk of COVID-19 transmission may be high? (this may include long-term care, correctional and prison facilities; homeless shelters; assisted-living facilities and group homes ): No      Resident in a special setting where the risk of COVID-19 transmission may be high? (this may include long-term care, correctional and prison facilities; homeless shelters; assisted-living facilities and group homes ): No      Negative COVID home test 8/2/22  Left Anterior Chest Pain - Symptoms x 1 month, unchanged, symptoms are intermittent  Dull pain  Currently 2-3/10, Max 4-5/10  Constant  Worse c inhalation  No pain c palpation    No OTC meds or treatments  Denies H/O of asthma  Lab Results   Component Value Date    SARSCOV2 Negative 12/01/2021     Past Medical History:   Diagnosis Date    Abnormal Pap smear of cervix     Anxiety     AR (allergic rhinitis) 8/2/2022    HPV (human papilloma virus) infection     Meniere's disease     Vaginal delivery 12/2019    Varicella     had one pox, unsure if immuned     Past Surgical History:   Procedure Laterality Date    CERVICAL BIOPSY  W/ LOOP ELECTRODE EXCISION      2015    TONSILLECTOMY      WISDOM TOOTH EXTRACTION       Current Outpatient Medications   Medication Sig Dispense Refill    albuterol (PROVENTIL HFA,VENTOLIN HFA) 90 mcg/act inhaler Inhale 2 puffs every 4 (four) hours as needed for shortness of breath 18 g 0    fexofenadine (ALLEGRA) 180 MG tablet Take 1 tablet (180 mg total) by mouth daily      fluticasone (FLONASE) 50 mcg/act nasal spray 2 sprays into each nostril daily As needed for allergies/congestion 1 Bottle 3    meclizine (ANTIVERT) 25 mg tablet Take 1 tablet (25 mg total) by mouth every 8 (eight) hours (Patient taking differently: Take 25 mg by mouth every 8 (eight) hours as needed) 30 tablet 0    montelukast (SINGULAIR) 10 mg tablet Take 1 tablet (10 mg total) by mouth daily at bedtime 30 tablet 1    Prenatal MV & Min w/FA-DHA (PRENATAL ADULT GUMMY/DHA/FA) 0 4-25 MG CHEW Chew 2 tablets daily at bedtime       No current facility-administered medications for this visit  Allergies   Allergen Reactions    Mold Extract [Trichophyton] Sneezing    Other GI Intolerance and Vomiting     Watermelon    Pollen Extract Sneezing       Review of Systems   Constitutional: Negative for chills, fatigue and fever  HENT: Negative for congestion, rhinorrhea (With activity) and sore throat  Respiratory: Positive for shortness of breath  Negative for cough and chest tightness  Gastrointestinal: Negative for abdominal pain, diarrhea, nausea and vomiting     Musculoskeletal: Positive for myalgias (Left anterior chest)  Neurological: Negative for headaches  Objective:    Vitals:    08/02/22 1055   BP: 108/72   Pulse: 92   Resp: 18   Temp: 97 6 °F (36 4 °C)   SpO2: 99%   Weight: 66 7 kg (147 lb)   Height: 5' 6" (1 676 m)       Physical Exam  Vitals and nursing note reviewed  Constitutional:       General: She is not in acute distress  Appearance: Normal appearance  She is well-developed and normal weight  HENT:      Head: Normocephalic and atraumatic  Eyes:      Conjunctiva/sclera: Conjunctivae normal    Cardiovascular:      Rate and Rhythm: Normal rate and regular rhythm  Heart sounds: No murmur heard  Pulmonary:      Effort: Pulmonary effort is normal  No respiratory distress  Breath sounds: Normal breath sounds  Chest:      Chest wall: No tenderness (Left anterior chest)  Abdominal:      Palpations: Abdomen is soft  Musculoskeletal:         General: No swelling or tenderness  Cervical back: Neck supple  Right lower leg: No edema  Left lower leg: No edema  Skin:     General: Skin is warm and dry  Neurological:      General: No focal deficit present  Mental Status: She is alert and oriented to person, place, and time  VIRTUAL VISIT [address] verbally agrees to participate in [facility]  Pt is aware that [facility] could be limited without vital signs or the ability to perform a full hands-on physical Waylan Jumper understands she or the provider may request at any time to terminate the video visit and request the patient to seek care or treatment in person

## 2022-08-02 NOTE — RESULT ENCOUNTER NOTE
Stable STAT CMP and negative D-dimer  This means clot in lung is less likely  Continue plan of care  Message sent to patient via [employer] patient portal     Nurse to also call patient

## 2022-08-02 NOTE — TELEPHONE ENCOUNTER
Phone call placed to patient, states she did not have time go to the lab yet to get her STAT CMP and D-dimer  Patient stated she will go later on this evening

## 2022-10-27 ENCOUNTER — HOSPITAL ENCOUNTER (OUTPATIENT)
Dept: PULMONOLOGY | Facility: HOSPITAL | Age: 42
End: 2022-10-27
Payer: COMMERCIAL

## 2022-10-27 DIAGNOSIS — R07.9 CHEST PAIN, UNSPECIFIED TYPE: ICD-10-CM

## 2022-10-27 DIAGNOSIS — R06.09 DOE (DYSPNEA ON EXERTION): ICD-10-CM

## 2022-10-27 DIAGNOSIS — J30.9 ALLERGIC RHINITIS, UNSPECIFIED SEASONALITY, UNSPECIFIED TRIGGER: ICD-10-CM

## 2022-10-27 PROCEDURE — 94726 PLETHYSMOGRAPHY LUNG VOLUMES: CPT

## 2022-10-27 PROCEDURE — 94760 N-INVAS EAR/PLS OXIMETRY 1: CPT

## 2022-10-27 PROCEDURE — 94729 DIFFUSING CAPACITY: CPT

## 2022-10-27 PROCEDURE — 94060 EVALUATION OF WHEEZING: CPT

## 2022-10-27 RX ORDER — ALBUTEROL SULFATE 2.5 MG/3ML
2.5 SOLUTION RESPIRATORY (INHALATION) ONCE
Status: COMPLETED | OUTPATIENT
Start: 2022-10-27 | End: 2022-10-27

## 2022-10-27 RX ADMIN — ALBUTEROL SULFATE 2.5 MG: 2.5 SOLUTION RESPIRATORY (INHALATION) at 16:27

## 2022-11-08 ENCOUNTER — OFFICE VISIT (OUTPATIENT)
Dept: FAMILY MEDICINE CLINIC | Facility: CLINIC | Age: 42
End: 2022-11-08

## 2022-11-08 VITALS
BODY MASS INDEX: 23.91 KG/M2 | HEIGHT: 66 IN | WEIGHT: 148.8 LBS | OXYGEN SATURATION: 99 % | RESPIRATION RATE: 18 BRPM | DIASTOLIC BLOOD PRESSURE: 74 MMHG | SYSTOLIC BLOOD PRESSURE: 118 MMHG | HEART RATE: 117 BPM | TEMPERATURE: 97.5 F

## 2022-11-08 DIAGNOSIS — J01.00 ACUTE NON-RECURRENT MAXILLARY SINUSITIS: Primary | ICD-10-CM

## 2022-11-08 RX ORDER — CEFUROXIME AXETIL 500 MG/1
500 TABLET ORAL EVERY 12 HOURS SCHEDULED
Qty: 20 TABLET | Refills: 0 | Status: SHIPPED | OUTPATIENT
Start: 2022-11-08 | End: 2022-11-18

## 2022-11-08 NOTE — PROGRESS NOTES
Name: Gonzalo Wayne      : 1980      MRN: [id number]  Encounter Provider: Anitra Ralph DO  Encounter Date: 2022   Encounter department: [address] [place] [address]  Acute non-recurrent maxillary sinusitis  Comments:  ceftin BID x 10 days  rest, fluids  Call if no better in 7-10 days or if symptoms worsen      Orders:  -     cefuroxime (CEFTIN) 500 mg tablet; Take 1 tablet (500 mg total) by mouth every 12 (twelve) hours for 10 days           Subjective      HPI   Pt presents with worsening congestion, facial pain, headache  Thinks she is developing sinus infection  Had what was almost certainly rsv starting two weeks ago (kids had same) and was feeling better, and then started worsening with current symptoms over the weekend    Review of Systems  See hpi    Current Outpatient Medications on File Prior to Visit   Medication Sig   • albuterol (PROVENTIL HFA,VENTOLIN HFA) 90 mcg/act inhaler Inhale 2 puffs every 4 (four) hours as needed for shortness of breath   • fexofenadine (ALLEGRA) 180 MG tablet Take 1 tablet (180 mg total) by mouth daily   • fluticasone (FLONASE) 50 mcg/act nasal spray 2 sprays into each nostril daily As needed for allergies/congestion   • meclizine (ANTIVERT) 25 mg tablet Take 1 tablet (25 mg total) by mouth every 8 (eight) hours (Patient taking differently: Take 25 mg by mouth every 8 (eight) hours as needed)   • montelukast (SINGULAIR) 10 mg tablet Take 1 tablet (10 mg total) by mouth daily at bedtime   • Prenatal MV & Min w/FA-DHA (PRENATAL ADULT GUMMY/DHA/FA) 0 4-25 MG CHEW Chew 2 tablets daily at bedtime       Objective     /74   Pulse (!) 117   Temp 97 5 °F (36 4 °C)   Resp 18   Ht 5' 6" (1 676 m)   Wt 67 5 kg (148 lb 12 8 oz)   SpO2 99%   BMI 24 02 kg/m²     Physical Exam  Constitutional:       General: She is not in acute distress  Appearance: She is well-developed  HENT:      Head: Normocephalic and atraumatic  Right Ear: Tympanic membrane, ear canal and external ear normal       Left Ear: Tympanic membrane, ear canal and external ear normal       Nose: Mucosal edema present  Right Sinus: Maxillary sinus tenderness present  Left Sinus: Maxillary sinus tenderness present  Mouth/Throat:      Pharynx: Posterior oropharyngeal erythema present  No oropharyngeal exudate  Eyes:      Conjunctiva/sclera: Conjunctivae normal       Pupils: Pupils are equal, round, and reactive to light  Cardiovascular:      Rate and Rhythm: Regular rhythm  Heart sounds: S1 normal and S2 normal  No murmur heard  No friction rub  No gallop  No S3 or S4 sounds  Pulmonary:      Effort: Pulmonary effort is normal       Breath sounds: Normal breath sounds  No wheezing or rhonchi  Lymphadenopathy:      Cervical: No cervical adenopathy         Estil Jazmin, DO

## 2022-11-15 ENCOUNTER — RA CDI HCC (OUTPATIENT)
Dept: OTHER | Facility: HOSPITAL | Age: 42
End: 2022-11-15

## 2022-11-15 NOTE — PROGRESS NOTES
[facility]  coding opportunities       Chart reviewed, no opportunity found: CHART REVIEWED, NO OPPORTUNITY FOUND        Patients Insurance        Commercial Insurance: Megan Allen

## 2022-11-22 ENCOUNTER — OFFICE VISIT (OUTPATIENT)
Dept: FAMILY MEDICINE CLINIC | Facility: CLINIC | Age: 42
End: 2022-11-22

## 2022-11-22 VITALS
RESPIRATION RATE: 20 BRPM | WEIGHT: 152 LBS | TEMPERATURE: 97.5 F | HEART RATE: 100 BPM | BODY MASS INDEX: 24.43 KG/M2 | SYSTOLIC BLOOD PRESSURE: 112 MMHG | DIASTOLIC BLOOD PRESSURE: 68 MMHG | HEIGHT: 66 IN | OXYGEN SATURATION: 98 %

## 2022-11-22 DIAGNOSIS — K64.9 HEMORRHOIDS, UNSPECIFIED HEMORRHOID TYPE: Primary | ICD-10-CM

## 2022-11-22 DIAGNOSIS — R07.89 CHEST PAIN, ATYPICAL: ICD-10-CM

## 2022-11-22 NOTE — PROGRESS NOTES
Assessment/Plan:       Problem List Items Addressed This Visit    None  Visit Diagnoses     Hemorrhoids, unspecified hemorrhoid type    -  Primary    desires eventual treatment- referral for colorectal given    Relevant Orders    Ambulatory referral to Colorectal Surgery    Chest pain, atypical        Resolved  she will notify the office if it returns  Subjective:     Daniel Cox is a 43 y o  female here today with chief complaint below:  Chief Complaint   Patient presents with   • Follow-up     Follow up on chest pain, pt reported last time she ha chest pain was in October       - CC above per clinical staff and reviewed  HPI:  Had been having CP L sided for a few months starting early summer  Intermittent  Saw Dr Donell Tillman when the pain was becoming more persistent  Since fall started, the pain has gone away  Wonders if it was from being in and outside from heat to a/c  No cough with it when it was happening  Didn't try inhaler b/c she never felt like she needed it  Has felt fine now- no longer having any symptoms  Hemorrhoids since having kids  Would like to get these repaired/removed at some point    The following portions of the patient's history were reviewed and updated as appropriate: allergies, current medications, past family history, past medical history, past social history, past surgical history and problem list     ROS:  Review of Systems     Objective:      /68 (BP Location: Left arm, Patient Position: Sitting, Cuff Size: Adult)   Pulse 100   Temp 97 5 °F (36 4 °C)   Resp 20   Ht 5' 6" (1 676 m)   Wt 68 9 kg (152 lb)   SpO2 98%   BMI 24 53 kg/m²   BP Readings from Last 3 Encounters:   11/22/22 112/68   11/08/22 118/74   08/02/22 108/72     Wt Readings from Last 3 Encounters:   11/22/22 68 9 kg (152 lb)   11/08/22 67 5 kg (148 lb 12 8 oz)   08/02/22 66 7 kg (147 lb)               Physical Exam:   Physical Exam  Vitals and nursing note reviewed     Constitutional: Appearance: Normal appearance  She is not ill-appearing  HENT:      Head: Normocephalic and atraumatic  Cardiovascular:      Rate and Rhythm: Normal rate and regular rhythm  Pulmonary:      Effort: Pulmonary effort is normal       Breath sounds: Normal breath sounds  Musculoskeletal:      Right lower leg: No edema  Left lower leg: No edema  Lymphadenopathy:      Cervical: No cervical adenopathy  Neurological:      Mental Status: She is alert and oriented to person, place, and time     Psychiatric:         Mood and Affect: Mood normal          Behavior: Behavior normal

## 2023-07-25 NOTE — PROGRESS NOTES
Daily Note     Today's date: 2020  Patient name: Gonzalo Wayne  : 1980  MRN: [id number]  Referring provider: Monica Mitchell MD  Dx:   Encounter Diagnosis     ICD-10-CM    1  Encounter for postpartum visit Z39 2    2  Stress incontinence N39 3    3  Urinary urgency R39 15    4  Pelvic floor weakness N81 89        Start Time: 1700  Stop Time: 1800  Total time in clinic (min): 60 minutes    Subjective: The patient states that she went and worked out with her  on Tuesday and she feels good, just a little sore  She did some light resistance training  She notes that she experienced leakage of urine while trying to perform squats  Objective: See treatment diary below      Assessment: Tolerated treatment well  Patient demonstrates good recruitment of pelvic floor muscles on biofeedback today  She also demonstrated good coordination with release to rest after contractions  She also was able to coordinate her diaphragm with her pelvic floor muscles well and had good endurance of 5 seconds prior to fatigue  She had some difficulty with practice of squats and experienced leakage with a deeper squat  Advised her to perform a small range squat with a pelvic floor contraction for support and she had no leakage  Discussed control of intra abdominal pressures with functional movements and exercises  She was given HEP for home today including some functional activities such as reaching and bending to help improve awareness  Plan: Continue per plan of care        Precautions: 11 weeks post partum; breastfeeding/pumping  Daily Treatment Diary   Fidencio Salgado HEP: [id number]    Manual                                                                                  Exercise Diary             PFMC: quick flicks 4"VSXBP/6"QLMP  10x           PF[facility]: slow holds 5"work/10"rest  10x           PF[facility]: elevators             TA ADIM  5"x10           TA ADIM + PFMC  5"x5           TA ADIM + [facility] + hip Subjective:       Patient ID: Padma Jasso is a 27 y.o. female.    Chief Complaint: Annual Exam  Padma Jasso presents today for routine annual exam. Last seen in 6/28/2022 by PCP, SHWETHA Whalen MD. This is her first visit with me.     HPI per HERMILA.     Patient Active Problem List   Diagnosis    Generalized anxiety disorder with panic attacks    Anorexia    Chronic migraine without aura, with intractable migraine, so stated, with status migrainosus    Occipital neuralgia    POTS (postural orthostatic tachycardia syndrome)    ADD (attention deficit disorder) without hyperactivity    Recurrent major depressive disorder, in full remission       Current Outpatient Medications:     aspirin (ECOTRIN) 81 MG EC tablet, Take 81 mg by mouth once daily., Disp: , Rfl:     betamethasone valerate 0.1% (VALISONE) 0.1 % Lotn, AAA of scalp bid prn scaling or itching., Disp: 60 mL, Rfl: 1    ivabradine (CORLANOR) 5 mg Tab, Take 1 tablet (5 mg total) by mouth 2 (two) times daily., Disp: 180 tablet, Rfl: 3    betaxoloL (KERLONE) 10 mg Tab, Take 1 tablet (10 mg total) by mouth once daily. Take 2.5 mg daily, Disp: 30 tablet, Rfl: 11    drospirenone-ethinyl estradiol (BRADLEY) 3-0.03 mg per tablet, Take 1 tablet by mouth once daily. (Patient not taking: Reported on 7/25/2023), Disp: 28 tablet, Rfl: 0    ketoconazole (NIZORAL) 2 % shampoo, Wash scalp with medicated shampoo at least 2x/week. Let sit on scalp at least 5 minutes prior to rinsing, Disp: 240 mL, Rfl: 5    The following portions of the patient's history were reviewed and updated as appropriate: allergies, past family history, past medical history, past social history and past surgical history.    Review of Systems   Constitutional:  Negative for appetite change, fatigue, fever and unexpected weight change.   HENT:  Negative for hearing loss, rhinorrhea, sneezing, sore throat and trouble swallowing.    Eyes:  Negative for visual disturbance.   Respiratory:  Negative for cough,  "shortness of breath and wheezing.    Cardiovascular:  Negative for chest pain and palpitations.   Gastrointestinal:  Negative for abdominal pain, constipation, diarrhea, nausea and vomiting.   Genitourinary:  Negative for difficulty urinating, dysuria, frequency and hematuria.   Musculoskeletal:  Negative for arthralgias and myalgias.   Skin:  Positive for rash.   Neurological:  Negative for dizziness, weakness and numbness.   Psychiatric/Behavioral:  Negative for sleep disturbance. The patient is not nervous/anxious.      Objective:      /70 (BP Location: Right arm, Patient Position: Sitting, BP Method: Small (Automatic))   Pulse 74   Ht 5' 2" (1.575 m)   Wt 53.8 kg (118 lb 11.2 oz)   LMP 07/21/2023 (Exact Date)   SpO2 100%   BMI 21.71 kg/m²     Physical Exam  Constitutional:       General: She is not in acute distress.     Appearance: Normal appearance.   HENT:      Head: Normocephalic and atraumatic.      Right Ear: Tympanic membrane normal.      Left Ear: Tympanic membrane normal.      Nose: Nose normal.      Mouth/Throat:      Mouth: Mucous membranes are moist.      Pharynx: Oropharynx is clear.   Eyes:      Extraocular Movements: Extraocular movements intact.      Pupils: Pupils are equal, round, and reactive to light.   Cardiovascular:      Rate and Rhythm: Normal rate and regular rhythm.      Pulses: Normal pulses.      Heart sounds: Normal heart sounds.   Pulmonary:      Effort: Pulmonary effort is normal.      Breath sounds: Normal breath sounds.   Abdominal:      Palpations: Abdomen is soft.   Musculoskeletal:         General: No swelling or deformity. Normal range of motion.      Cervical back: Normal range of motion and neck supple.   Skin:     General: Skin is warm and dry.      Capillary Refill: Capillary refill takes less than 2 seconds.   Neurological:      General: No focal deficit present.      Mental Status: She is alert and oriented to person, place, and time.      Coordination: " add isometrics  10x           TA ADIM + PFMC + hip abd isometrics  10x           PF[facility] + bridge  eccentric  10x            clamshells             TA + PF[facility] in quadruped             Seated PFMC             Pball seated PFMC             [facility] with sit to stand             PF[facility] in standing  10x   QF's           [facility] + squat  10x   Small range           [facility] + reaching into cabinet  3#  5x   First shelf           PF[facility] + picking object off stool  3#  5x                                                                                Modalities Coordination normal.      Gait: Gait normal.   Psychiatric:         Mood and Affect: Mood normal.         Behavior: Behavior normal.       Assessment:       1. Annual physical exam    2. Screening for STD (sexually transmitted disease)    3. Seborrheic dermatitis        Plan:   Padma was seen today for annual exam.    Diagnoses and all orders for this visit:    Annual physical exam  -     Lipid Panel; Future  -     Comprehensive Metabolic Panel; Future  -     CBC Auto Differential; Future  -     TSH; Future    Screening for STD (sexually transmitted disease)  -     RPR; Future  -     HIV 1/2 Ag/Ab (4th Gen); Future  -     Hepatitis Panel, Acute; Future    Seborrheic dermatitis  -     ketoconazole (NIZORAL) 2 % shampoo; Wash scalp with medicated shampoo at least 2x/week. Let sit on scalp at least 5 minutes prior to rinsing

## 2023-08-03 NOTE — PATIENT INSTRUCTIONS

## 2023-12-08 ENCOUNTER — OFFICE VISIT (OUTPATIENT)
Dept: FAMILY MEDICINE CLINIC | Facility: CLINIC | Age: 43
End: 2023-12-08
Payer: COMMERCIAL

## 2023-12-08 ENCOUNTER — LAB REQUISITION (OUTPATIENT)
Dept: LAB | Facility: HOSPITAL | Age: 43
End: 2023-12-08
Payer: COMMERCIAL

## 2023-12-08 VITALS
BODY MASS INDEX: 24.75 KG/M2 | DIASTOLIC BLOOD PRESSURE: 54 MMHG | TEMPERATURE: 98 F | WEIGHT: 154 LBS | OXYGEN SATURATION: 99 % | RESPIRATION RATE: 16 BRPM | HEART RATE: 86 BPM | SYSTOLIC BLOOD PRESSURE: 100 MMHG | HEIGHT: 66 IN

## 2023-12-08 DIAGNOSIS — R07.0 THROAT PAIN: Primary | ICD-10-CM

## 2023-12-08 DIAGNOSIS — H92.02 LEFT EAR PAIN: ICD-10-CM

## 2023-12-08 DIAGNOSIS — R07.0 PAIN IN THROAT: ICD-10-CM

## 2023-12-08 PROCEDURE — 99213 OFFICE O/P EST LOW 20 MIN: CPT | Performed by: FAMILY MEDICINE

## 2023-12-08 PROCEDURE — 87070 CULTURE OTHR SPECIMN AEROBIC: CPT | Performed by: FAMILY MEDICINE

## 2023-12-09 NOTE — PROGRESS NOTES
Name: Rosanna Nguyen      : 1980      MRN: [id number]  Encounter Provider: Logan Betancourt DO  Encounter Date: 2023   Encounter department: [facility]     1. Throat pain  Comments:  has what appears to be aphthous ulcer on L soft palte near throat   symptomatic care  send throat cx  given on/off symptoms, refer back to ENT  Orders:  -     Ambulatory Referral to Otolaryngology; Future  -     Throat culture; Future  -     Throat culture    2. Left ear pain  Comments:  nml exam  refer to ENT given hx of hearing loss and intermittent sx  Orders:  -     Ambulatory Referral to Otolaryngology; Future        Depression Screening and Follow-up Plan: Patient was screened for depression during today's encounter. They screened negative with a PHQ-2 score of 0. Subjective      Sore Throat   This is a recurrent problem. The current episode started more than 1 month ago. The problem has been waxing and waning. The pain is worse on the left side. There has been no fever. Associated symptoms include ear pain, a plugged ear sensation and neck pain. She has had no exposure to strep or mono. Pt presents c/o intermittent ST/throat pain sometimes radiating to ear. On and off for 4 yrs. Most recent episode started within the week. Has hearing loss on the R starting after uri several years ago. Has seen ent in the past but hasn't been there in years. No fevers. No congestion. Review of Systems   HENT:  Positive for ear pain and sore throat. Musculoskeletal:  Positive for neck pain.      See hpi    Current Outpatient Medications on File Prior to Visit   Medication Sig    meclizine (ANTIVERT) 25 mg tablet Take 1 tablet (25 mg total) by mouth every 8 (eight) hours (Patient taking differently: Take 25 mg by mouth every 8 (eight) hours as needed)    albuterol (PROVENTIL HFA,VENTOLIN HFA) 90 mcg/act inhaler Inhale 2 puffs every 4 (four) hours as needed for shortness of breath (Patient not taking: Reported on 12/8/2023)    fexofenadine (ALLEGRA) 180 MG tablet Take 1 tablet (180 mg total) by mouth daily (Patient not taking: Reported on 12/8/2023)    fluticasone (FLONASE) 50 mcg/act nasal spray 2 sprays into each nostril daily As needed for allergies/congestion (Patient not taking: Reported on 12/8/2023)    montelukast (SINGULAIR) 10 mg tablet Take 1 tablet (10 mg total) by mouth daily at bedtime (Patient not taking: Reported on 12/8/2023)       Objective     /54   Pulse 86   Temp 98 °F (36.7 °C)   Resp 16   Ht 5' 6" (1.676 m)   Wt 69.9 kg (154 lb)   SpO2 99%   BMI 24.86 kg/m²     Physical Exam  Constitutional:       General: She is not in acute distress. Appearance: Normal appearance. She is well-developed. HENT:      Head: Normocephalic and atraumatic. Right Ear: Tympanic membrane, ear canal and external ear normal.      Left Ear: Tympanic membrane, ear canal and external ear normal.      Nose: Nose normal.      Mouth/Throat:      Mouth: Mucous membranes are moist.      Pharynx: Oropharynx is clear. No posterior oropharyngeal erythema. Eyes:      Extraocular Movements: Extraocular movements intact. Conjunctiva/sclera: Conjunctivae normal.      Pupils: Pupils are equal, round, and reactive to light. Neck:      Thyroid: No thyromegaly. Vascular: No carotid bruit or JVD. Cardiovascular:      Rate and Rhythm: Normal rate and regular rhythm. Heart sounds: S1 normal and S2 normal. No murmur heard. No friction rub. No gallop. No S3 or S4 sounds. Pulmonary:      Effort: Pulmonary effort is normal.      Breath sounds: Normal breath sounds. No wheezing, rhonchi or rales. Musculoskeletal:      Cervical back: Neck supple. Lymphadenopathy:      Cervical: No cervical adenopathy. Neurological:      General: No focal deficit present. Mental Status: She is alert and oriented to person, place, and time.       Cranial Nerves: No cranial nerve deficit. Sensory: No sensory deficit. Deep Tendon Reflexes: Reflexes are normal and symmetric.  Reflexes normal.       Marie Costa DO

## 2023-12-10 LAB — BACTERIA THROAT CULT: NORMAL

## 2023-12-11 LAB — BACTERIA THROAT CULT: NORMAL

## 2024-03-04 ENCOUNTER — OFFICE VISIT (OUTPATIENT)
Dept: URGENT CARE | Facility: MEDICAL CENTER | Age: 44
End: 2024-03-04
Payer: COMMERCIAL

## 2024-03-04 VITALS
BODY MASS INDEX: 24.11 KG/M2 | DIASTOLIC BLOOD PRESSURE: 56 MMHG | SYSTOLIC BLOOD PRESSURE: 113 MMHG | RESPIRATION RATE: 18 BRPM | WEIGHT: 150 LBS | HEIGHT: 66 IN | TEMPERATURE: 98.4 F | OXYGEN SATURATION: 99 % | HEART RATE: 90 BPM

## 2024-03-04 DIAGNOSIS — R05.1 ACUTE COUGH: Primary | ICD-10-CM

## 2024-03-04 PROCEDURE — 99213 OFFICE O/P EST LOW 20 MIN: CPT | Performed by: PHYSICIAN ASSISTANT

## 2024-03-04 RX ORDER — AMOXICILLIN 500 MG/1
500 CAPSULE ORAL EVERY 8 HOURS SCHEDULED
Qty: 21 CAPSULE | Refills: 0 | Status: SHIPPED | OUTPATIENT
Start: 2024-03-04 | End: 2024-03-11

## 2024-03-04 RX ORDER — PREDNISONE 20 MG/1
40 TABLET ORAL DAILY
Qty: 10 TABLET | Refills: 0 | Status: SHIPPED | OUTPATIENT
Start: 2024-03-04 | End: 2024-03-09

## 2024-03-04 NOTE — PROGRESS NOTES
[facility]        NAME: Makenna Vuong is a 43 y.o. female  : 1980    MRN: [id number]  DATE: 2024  TIME: 6:13 PM    Assessment and Plan   Acute cough [R05.1]  1. Acute cough  amoxicillin (AMOXIL) 500 mg capsule    predniSONE 20 mg tablet            Patient Instructions     Cough  Prednisone once daily x 5 days  Amoxicillin as directed  Humidifier in bedroom, steam from shower  Follow up with PCP in 3-5 days.  Proceed to  ER if symptoms worsen.    Chief Complaint     Chief Complaint   Patient presents with    Sinus Problem     Patient has sinus pressure, facial pain, tenderness, jaw pain and teeth pain; ongoing for 3 days          History of Present Illness       43-year-old female who presents complaining of cough, congestion, sinus pressure/pain, runny nose x 5 days.  Patient states she has been trying over-the-counter medications with no relief.  Denies fevers, chills, chest pain, shortness of breath.    Sinus Problem  Associated symptoms include congestion, coughing, ear pain, sinus pressure and a sore throat. Pertinent negatives include no chills, diaphoresis, shortness of breath or sneezing.       Review of Systems   Review of Systems   Constitutional:  Negative for activity change, appetite change, chills, diaphoresis, fatigue and fever.   HENT:  Positive for congestion, ear pain, rhinorrhea, sinus pressure, sinus pain and sore throat. Negative for ear discharge, facial swelling, hearing loss, mouth sores, nosebleeds, postnasal drip, sneezing and voice change.    Respiratory:  Positive for cough. Negative for apnea, choking, chest tightness, shortness of breath, wheezing and stridor.    Cardiovascular: Negative.          Current Medications       Current Outpatient Medications:     amoxicillin (AMOXIL) 500 mg capsule, Take 1 capsule (500 mg total) by mouth every 8 (eight) hours for 7 days, Disp: 21 capsule, Rfl: 0    predniSONE 20 mg tablet, Take 2 tablets (40 mg total) by mouth  daily for 5 days, Disp: 10 tablet, Rfl: 0    albuterol (PROVENTIL HFA,VENTOLIN HFA) 90 mcg/act inhaler, Inhale 2 puffs every 4 (four) hours as needed for shortness of breath (Patient not taking: Reported on 12/8/2023), Disp: 18 g, Rfl: 0    fexofenadine (ALLEGRA) 180 MG tablet, Take 1 tablet (180 mg total) by mouth daily (Patient not taking: Reported on 12/8/2023), Disp: , Rfl:     fluticasone (FLONASE) 50 mcg/act nasal spray, 2 sprays into each nostril daily As needed for allergies/congestion (Patient not taking: Reported on 12/8/2023), Disp: 1 Bottle, Rfl: 3    meclizine (ANTIVERT) 25 mg tablet, Take 1 tablet (25 mg total) by mouth every 8 (eight) hours (Patient taking differently: Take 25 mg by mouth every 8 (eight) hours as needed), Disp: 30 tablet, Rfl: 0    montelukast (SINGULAIR) 10 mg tablet, Take 1 tablet (10 mg total) by mouth daily at bedtime (Patient not taking: Reported on 12/8/2023), Disp: 30 tablet, Rfl: 1    Current Allergies     Allergies as of 03/04/2024 - Reviewed 03/04/2024   Allergen Reaction Noted    Mold extract [trichophyton] Sneezing 11/27/2017    Other GI Intolerance and Vomiting 11/27/2017    Pollen extract Sneezing 11/27/2017            The following portions of the patient's history were reviewed and updated as appropriate: allergies, current medications, past family history, past medical history, past social history, past surgical history and problem list.     Past Medical History:   Diagnosis Date    Abnormal Pap smear of cervix     Anxiety     AR (allergic rhinitis) 08/02/2022    HL (hearing loss)     HPV (human papilloma virus) infection     Meniere's disease     Vaginal delivery 12/2019    Varicella     had one pox, unsure if immuned       Past Surgical History:   Procedure Laterality Date    CERVICAL BIOPSY  W/ LOOP ELECTRODE EXCISION      2015    TONSILLECTOMY      WISDOM TOOTH EXTRACTION         Family History   Problem Relation Age of Onset    Hypertension Mother     Diabetes  "Mother     Arthritis Mother     Thyroid disease Mother     Varicose Veins Mother     Meniere's disease Mother     Cancer Father         skin    Other Father         Blood Transfusion    No Known Problems Sister     No Known Problems Brother     Cancer Maternal Grandmother         skin    Cancer Maternal Grandfather         skin    Cancer Paternal Grandmother         skin    Alzheimer's disease Paternal Grandmother     Osteoporosis Paternal Grandmother     Heart attack Paternal Grandfather     Cancer Paternal Grandfather         SKIN    Hypertension Paternal Grandfather     No Known Problems Daughter     Breast cancer Paternal Aunt     Hypertension Family     No Known Problems Daughter          Medications have been verified.        Objective   /56 (BP Location: Right arm, Patient Position: Sitting, Cuff Size: Standard)   Pulse 90   Temp 98.4 °F (36.9 °C)   Resp 18   Ht 5' 6\" (1.676 m)   Wt 68 kg (150 lb)   SpO2 99%   BMI 24.21 kg/m²        Physical Exam     Physical Exam  Constitutional:       General: She is not in acute distress.     Appearance: Normal appearance. She is well-developed. She is not diaphoretic.   HENT:      Head: Normocephalic and atraumatic.      Right Ear: Hearing, tympanic membrane, ear canal and external ear normal.      Left Ear: Hearing, tympanic membrane, ear canal and external ear normal.      Nose: Rhinorrhea present.      Mouth/Throat:      Mouth: Mucous membranes are moist.      Pharynx: Oropharynx is clear. Uvula midline. No oropharyngeal exudate or posterior oropharyngeal erythema.   Cardiovascular:      Rate and Rhythm: Normal rate and regular rhythm.      Heart sounds: Normal heart sounds.   Pulmonary:      Effort: Pulmonary effort is normal. No respiratory distress.      Breath sounds: Normal breath sounds. No stridor. No wheezing, rhonchi or rales.   Chest:      Chest wall: No tenderness.   Musculoskeletal:      Cervical back: Normal range of motion and neck supple. "   Lymphadenopathy:      Cervical: Cervical adenopathy present.   Neurological:      Mental Status: She is alert.

## 2024-03-04 NOTE — PATIENT INSTRUCTIONS
Cough  Prednisone once daily x 5 days  Amoxicillin as directed  Humidifier in bedroom, steam from shower  Follow up with PCP in 3-5 days.  Proceed to  ER if symptoms worsen.

## 2024-11-04 ENCOUNTER — IMMUNIZATIONS (OUTPATIENT)
Dept: FAMILY MEDICINE CLINIC | Facility: CLINIC | Age: 44
End: 2024-11-04
Payer: COMMERCIAL

## 2024-11-04 DIAGNOSIS — Z23 ENCOUNTER FOR IMMUNIZATION: Primary | ICD-10-CM

## 2024-11-04 PROCEDURE — G0008 ADMIN INFLUENZA VIRUS VAC: HCPCS | Performed by: FAMILY MEDICINE

## 2024-11-04 PROCEDURE — 90656 IIV3 VACC NO PRSV 0.5 ML IM: CPT | Performed by: FAMILY MEDICINE

## 2025-01-25 NOTE — TELEPHONE ENCOUNTER
Can write note - Sharran Simmonds is 117w2d pregnant and under my care  Her due date is (please fill in)  She is fit to fly  Please contact the office with any questions  knee pain/injury

## 2025-03-21 ENCOUNTER — RA CDI HCC (OUTPATIENT)
Dept: OTHER | Facility: HOSPITAL | Age: 45
End: 2025-03-21

## 2025-03-31 ENCOUNTER — OFFICE VISIT (OUTPATIENT)
Dept: FAMILY MEDICINE CLINIC | Facility: CLINIC | Age: 45
End: 2025-03-31
Payer: COMMERCIAL

## 2025-03-31 VITALS
BODY MASS INDEX: 24.85 KG/M2 | OXYGEN SATURATION: 99 % | HEART RATE: 80 BPM | SYSTOLIC BLOOD PRESSURE: 112 MMHG | HEIGHT: 66 IN | TEMPERATURE: 97.9 F | WEIGHT: 154.6 LBS | DIASTOLIC BLOOD PRESSURE: 70 MMHG

## 2025-03-31 DIAGNOSIS — Z00.00 ANNUAL PHYSICAL EXAM: Primary | ICD-10-CM

## 2025-03-31 DIAGNOSIS — Z29.9 PREVENTIVE MEASURE: ICD-10-CM

## 2025-03-31 DIAGNOSIS — Z80.8 FAMILY HISTORY OF SKIN CANCER: ICD-10-CM

## 2025-03-31 DIAGNOSIS — B00.1 RECURRENT COLD SORES: ICD-10-CM

## 2025-03-31 DIAGNOSIS — N92.0 MENORRHAGIA WITH REGULAR CYCLE: ICD-10-CM

## 2025-03-31 PROCEDURE — 99396 PREV VISIT EST AGE 40-64: CPT | Performed by: FAMILY MEDICINE

## 2025-03-31 RX ORDER — VALACYCLOVIR HYDROCHLORIDE 1 G/1
TABLET, FILM COATED ORAL
Qty: 20 TABLET | Refills: 2 | Status: SHIPPED | OUTPATIENT
Start: 2025-03-31 | End: 2026-03-31